# Patient Record
Sex: FEMALE | Race: WHITE | HISPANIC OR LATINO | Employment: PART TIME | URBAN - METROPOLITAN AREA
[De-identification: names, ages, dates, MRNs, and addresses within clinical notes are randomized per-mention and may not be internally consistent; named-entity substitution may affect disease eponyms.]

---

## 2017-01-17 ENCOUNTER — OFFICE VISIT (OUTPATIENT)
Dept: CARDIOLOGY | Facility: MEDICAL CENTER | Age: 62
End: 2017-01-17
Payer: COMMERCIAL

## 2017-01-17 VITALS
BODY MASS INDEX: 26.75 KG/M2 | HEIGHT: 63 IN | WEIGHT: 151 LBS | DIASTOLIC BLOOD PRESSURE: 80 MMHG | OXYGEN SATURATION: 96 % | SYSTOLIC BLOOD PRESSURE: 120 MMHG | HEART RATE: 74 BPM

## 2017-01-17 DIAGNOSIS — R07.2 PRECORDIAL PAIN: ICD-10-CM

## 2017-01-17 PROCEDURE — 99214 OFFICE O/P EST MOD 30 MIN: CPT | Performed by: INTERNAL MEDICINE

## 2017-01-17 ASSESSMENT — ENCOUNTER SYMPTOMS
WEAKNESS: 0
CLAUDICATION: 0
CHILLS: 0
PND: 0
LOSS OF CONSCIOUSNESS: 0
HEADACHES: 0
FALLS: 0
SHORTNESS OF BREATH: 0
FEVER: 0
DIZZINESS: 0
SORE THROAT: 0
COUGH: 0
BLURRED VISION: 0
ABDOMINAL PAIN: 0
FOCAL WEAKNESS: 0
BRUISES/BLEEDS EASILY: 0
PALPITATIONS: 0
NAUSEA: 0

## 2017-01-17 NOTE — MR AVS SNAPSHOT
"Elizabeth Blunt   2017 11:30 AM   Office Visit   MRN: [id number]    Department:  [facility]   Dept Phone:  [phone]    Description:  Female : 1955   Provider:  Jaime Bryan M.D.           Reason for Visit     Follow-Up           Allergies as of 2017     No Known Allergies      You were diagnosed with     Precordial pain   [786.51.ICD-9-CM]         Vital Signs     Blood Pressure Pulse Height Weight Body Mass Index Oxygen Saturation    120/80 mmHg 74 1.6 m (5' 2.99\") 68.493 kg (151 lb) 26.76 kg/m2 96%    Last Menstrual Period Smoking Status                2002 Former Smoker          Basic Information     Date Of Birth Sex Race Ethnicity Preferred Language    1955 Female White  Origin ([place],Faroese,Mosotho,Bhutanese, etc) English      Your appointments     2017  3:20 PM   MA SCRN10 with RB MG 2   [facility] ([address])    [address] [place] [address]  [facility] [id number] [phone]           No deodorant, powder, perfume or lotion under the arm or breast area.            2017  3:00 PM   Established Patient with WILLIAM Tyler   [facility] ([address] [id number] [phone]           You will be receiving a confirmation call a few days before your appointment from our automated call confirmation system.              Problem List              ICD-10-CM Priority Class Noted - Resolved    Dyslipidemia E78.5   Unknown - Present    Low back pain M54.5   Unknown - Present    Allergic rhinitis    Unknown - Present    GERD (gastroesophageal reflux disease) K21.9   2009 - Present    HTN (hypertension) I10   11/10/2009 - Present    Migraine headache G43.909   3/7/2012 - Present    Tear of medial cartilage or meniscus of knee, current [id number]   2014 - Present    Chest pain R07.9   3/4/2015 - Present    Essential hypertension I10   2015 " - Present    Diabetes mellitus without complication (CMS-HCC) E11.9   11/24/2015 - Present    Biliary dyskinesia K82.8   8/3/2016 - Present      Health Maintenance        Date Due Completion Dates    IMM HEP B VACCINE (1 of 3 - Primary Series) 1955 ---    DIABETES MONOFILAMENT / LE EXAM 12/7/2012 12/7/2011 (Done)    Override on 12/7/2011: Done    IMM INFLUENZA (1) 9/1/2016 10/24/2015, 10/1/2014, 12/13/2012, 11/16/2011, 11/10/2010    MAMMOGRAM 11/30/2016 11/30/2015, 11/21/2014, 11/20/2013, 11/19/2012, 11/16/2011, 11/15/2010, 11/13/2009, 11/13/2009, 8/11/2008, 8/11/2008, 8/9/2007, 8/9/2007, 7/31/2006    RETINAL SCREENING 2/9/2017 2/9/2016, 9/24/2013    A1C SCREENING 5/26/2017 11/26/2016, 5/21/2016, 11/19/2015, 8/24/2015, 3/19/2015, 12/18/2014, 9/18/2014, 6/17/2014, 12/16/2013, 9/16/2013, 6/14/2013, 3/11/2013, 12/7/2012, 6/1/2012, 3/5/2012, 11/29/2011, 8/31/2011, 2/9/2011, 11/3/2010, 8/6/2010, 4/20/2010, 2/3/2010, 11/4/2009, 7/20/2009, 1/14/2009    FASTING LIPID PROFILE 11/26/2017 11/26/2016, 5/21/2016, 11/19/2015, 8/24/2015, 3/19/2015, 3/5/2015, 12/18/2014, 9/18/2014, 6/17/2014, 12/16/2013, 9/16/2013, 6/14/2013, 3/11/2013, 12/7/2012, 6/1/2012, 3/5/2012, 11/29/2011, 8/31/2011, 2/9/2011, 11/3/2010, 8/6/2010, 4/20/2010, 2/3/2010, 11/4/2009, 7/20/2009, 1/14/2009    URINE ACR / MICROALBUMIN 11/26/2017 11/26/2016, 8/24/2015, 6/17/2014, 6/14/2013, 6/1/2012, 6/9/2011, 8/6/2010, 11/4/2009, 1/14/2009    SERUM CREATININE 11/26/2017 11/26/2016, 7/27/2016, 7/12/2016, 5/21/2016, 11/19/2015, 8/24/2015, 3/19/2015, 3/4/2015, 12/18/2014, 9/18/2014, 6/17/2014, 1/30/2014, 12/16/2013, 9/16/2013, 6/14/2013, 3/11/2013, 12/7/2012, 9/8/2012, 6/1/2012, 3/5/2012, 11/29/2011, 8/31/2011, 2/9/2011, 11/3/2010, 8/6/2010, 4/20/2010, 3/23/2010, 2/10/2010, 2/3/2010, 2/3/2010, 11/4/2009, 7/20/2009, 1/14/2009    COLONOSCOPY 12/7/2021 12/7/2011 (Prv Comp)    Override on 12/7/2011: Previously completed (3/09)    IMM DTaP/Tdap/Td Vaccine (3 - Td)  12/19/2026 12/19/2016, 2/11/2008            Current Immunizations     Influenza TIV (IM) 12/13/2012, 11/16/2011    Influenza Vaccine Pediatric 11/10/2010    Influenza Vaccine Quad Inj (Pf) 10/24/2015, 10/1/2014    Pneumococcal polysaccharide vaccine (PPSV-23) 12/13/2012    Tdap Vaccine 12/19/2016 12:49 AM, 2/11/2008      Below and/or attached are the medications your provider expects you to take. Review all of your home medications and newly ordered medications with your provider and/or pharmacist. Follow medication instructions as directed by your provider and/or pharmacist. Please keep your medication list with you and share with your provider. Update the information when medications are discontinued, doses are changed, or new medications (including over-the-counter products) are added; and carry medication information at all times in the event of emergency situations     Allergies:  No Known Allergies          Medications  Valid as of: January 17, 2017 - 12:21 PM    Generic Name Brand Name Tablet Size Instructions for use    AmLODIPine Besylate (Tab) NORVASC 5 MG TAKE 1 TABLET BY MOUTH EVERY DAY        Cephalexin (Cap) KEFLEX 500 MG Take 1 Cap by mouth 4 times a day.        Cholecalciferol   Take  by mouth.        Hydrocodone-Acetaminophen (Tab) NORCO 5-325 MG Take 1-2 Tabs by mouth every four hours as needed.        Losartan Potassium-HCTZ (Tab) HYZAAR 100-25 MG TAKE ONE (1) TABLET BY MOUTH ONCE DAILY.        MetFORMIN HCl (Tab) GLUCOPHAGE 1000 MG TAKE 1 TAB BY MOUTH 2 TIMES A DAY, WITH MEALS.        Omeprazole (CAPSULE DELAYED RELEASE) PRILOSEC 20 MG Take 1 Cap by mouth every day.        Simvastatin (Tab) ZOCOR 40 MG TAKE 1 TABLET BY MOUTH IN THE EVENING        SUMAtriptan Succinate (Tab) IMITREX 100 MG Take 1 Tab by mouth Once PRN for Migraine for up to 1 dose.        .                 Medicines prescribed today were sent to:     [facility] #[id number] - [place], [place] - [address] [place] [address]    [address] [place] [address] [place]  [place] [address]    Phone: [phone] Fax: [phone]    Open 24 Hours?: No      Medication refill instructions:       If your prescription bottle indicates you have medication refills left, it is not necessary to call your provider’s office. Please contact your pharmacy and they will refill your medication.    If your prescription bottle indicates you do not have any refills left, you may request refills at any time through one of the following ways: The online Intellisense system (except Urgent Care), by calling your provider’s office, or by asking your pharmacy to contact your provider’s office with a refill request. Medication refills are processed only during regular business hours and may not be available until the next business day. Your provider may request additional information or to have a follow-up visit with you prior to refilling your medication.   *Please Note: Medication refills are assigned a new Rx number when refilled electronically. Your pharmacy may indicate that no refills were authorized even though a new prescription for the same medication is available at the pharmacy. Please request the medicine by name with the pharmacy before contacting your provider for a refill.        Referral     A referral request has been sent to our patient care coordination department. Please allow 3-5 business days for us to process this request and contact you either by phone or mail. If you do not hear from us by the 5th business day, please call us at [phone].           Intellisense Access Code: [email or web address]  Expires: 2/16/2017 12:21 PM    Intellisense  A secure, online tool to manage your health information     [employer]’s Intellisense® is a secure, online tool that connects you to your personalized health information from the privacy of your home -- day or night - making it very easy for you to manage your healthcare. Once the activation process is completed, you can even access your medical information using the  GameLayers giselle, which is available for free in the Apple Giselle store or Google Play store.     GameLayers provides the following levels of access (as shown below):   My Chart Features   [facility] Doctor [facility]  Specialists [facility]  Urgent  Care Non-[facility]  Primary Care  Doctor   Email your healthcare team securely and privately 24/7 X X X    Manage appointments: schedule your next appointment; view details of past/upcoming appointments X      Request prescription refills. X      View recent personal medical records, including lab and immunizations X X X X   View health record, including health history, allergies, medications X X X X   Read reports about your outpatient visits, procedures, consult and ER notes X X X X   See your discharge summary, which is a recap of your hospital and/or ER visit that includes your diagnosis, lab results, and care plan. X X       How to register for GameLayers:  1. Go to  [email or web address].  2. Click on the Sign Up Now box, which takes you to the New Member Sign Up page. You will need to provide the following information:  a. Enter your GameLayers Access Code exactly as it appears at the top of this page. (You will not need to use this code after you’ve completed the sign-up process. If you do not sign up before the expiration date, you must request a new code.)   b. Enter your date of birth.   c. Enter your home email address.   d. Click Submit, and follow the next screen’s instructions.  3. Create a GameLayers ID. This will be your GameLayers login ID and cannot be changed, so think of one that is secure and easy to remember.  4. Create a GameLayers password. You can change your password at any time.  5. Enter your Password Reset Question and Answer. This can be used at a later time if you forget your password.   6. Enter your e-mail address. This allows you to receive e-mail notifications when new information is available in GameLayers.  7. Click Sign Up. You can now view your health  information.    For assistance activating your [employer] account, call [phone]

## 2017-01-17 NOTE — Clinical Note
January 17, 2017       Patient: Elizabeth Blunt   YOB: 1955   Date of Visit: 1/17/2017         To Whom It May Concern:    It is my medical opinion that Elizabeth Blunt had an appointment today.    If you have any questions or concerns, please don't hesitate to call [phone]          Sincerely,          Jaime Bryan M.D.  Electronically Signed

## 2017-01-18 NOTE — PROGRESS NOTES
Subjective:   Elizabeth Bulnt is a 61 y.o. female who presents today for follow-up of recurrent chest pains she had a normal stress echocardiogram with good exertional tolerance last year in that setting    She continues to describe occasional chest pain starting in the anterior chest radiating to the back have been associated with significant stress in the past but she has improved her work situation by a change in her shift and continues to have the pain she did also try Prilosec without significant improvement.  Her chest pain is relieved by drinking a glass or 2 of cold water    Past Medical History   Diagnosis Date   • Dyslipidemia    • Low back pain    • Allergic rhinitis    • HTN    • Migraine headache    • High cholesterol    • Gynecological disorder    • Dental disorder      partial upper   • Diabetes      on meds     Past Surgical History   Procedure Laterality Date   • Sigmoidoscopy flex  3/10     normal   • Ankle orif  5/07   • Colonoscopy  3/09     normal   • Appendectomy  2003   • Abdominal hysterectomy total  2002     partial due to fibroids   • Septoturbinoplasty  3/30/2010     Performed by TRAV MENJIVAR at SURGERY SAME DAY [facility] ORS   • Knee arthroscopy  2/13/2014     Performed by Wiley Kenyon M.D. at SURGERY [facility]   • Medial meniscectomy  2/13/2014     Performed by Wiley Kenyon M.D. at SURGERY [facility]   • Oren by laparoscopy Bilateral 8/3/2016     Procedure: OREN BY LAPAROSCOPY;  Surgeon: Scott Canales M.D.;  Location: SURGERY SAME DAY [facility];  Service:      Family History   Problem Relation Age of Onset   • Hypertension Mother    • Cancer Mother      colon   • Hypertension Father    • Cancer Father      prostate   • Cancer Sister 37     breast cancer     History   Smoking status   • Former Smoker -- 0.50 packs/day for 20 years   • Types: Cigarettes   • Quit date: 01/01/2006   Smokeless tobacco   • Never Used     Comment: 5 cig's a day for 20 yrs quit in 2005     No  "Known Allergies  Outpatient Encounter Prescriptions as of 1/17/2017   Medication Sig Dispense Refill   • hydrocodone-acetaminophen (NORCO) 5-325 MG Tab per tablet Take 1-2 Tabs by mouth every four hours as needed. 20 Tab 0   • cephALEXin (KEFLEX) 500 MG Cap Take 1 Cap by mouth 4 times a day. 28 Cap 0   • omeprazole (PRILOSEC) 20 MG delayed-release capsule Take 1 Cap by mouth every day. 30 Cap 11   • losartan-hydrochlorothiazide (HYZAAR) 100-25 MG per tablet TAKE ONE (1) TABLET BY MOUTH ONCE DAILY. 90 Tab 0   • amlodipine (NORVASC) 5 MG Tab TAKE 1 TABLET BY MOUTH EVERY DAY 90 Tab 0   • metformin (GLUCOPHAGE) 1000 MG tablet TAKE 1 TAB BY MOUTH 2 TIMES A DAY, WITH MEALS. 180 Tab 0   • simvastatin (ZOCOR) 40 MG Tab TAKE 1 TABLET BY MOUTH IN THE EVENING 90 Tab 1   • Cholecalciferol (VITAMIN D PO) Take  by mouth.     • sumatriptan (IMITREX) 100 MG tablet Take 1 Tab by mouth Once PRN for Migraine for up to 1 dose. 10 Tab 3     No facility-administered encounter medications on file as of 1/17/2017.     Review of Systems   Constitutional: Negative for fever and chills.   HENT: Negative for sore throat.    Eyes: Negative for blurred vision.   Respiratory: Negative for cough and shortness of breath.    Cardiovascular: Positive for chest pain. Negative for palpitations, claudication, leg swelling and PND.   Gastrointestinal: Negative for nausea and abdominal pain.   Musculoskeletal: Negative for falls.   Skin: Negative for rash.   Neurological: Negative for dizziness, focal weakness, loss of consciousness, weakness and headaches.   Endo/Heme/Allergies: Does not bruise/bleed easily.        Objective:   /80 mmHg  Pulse 74  Ht 1.6 m (5' 2.99\")  Wt 68.493 kg (151 lb)  BMI 26.76 kg/m2  SpO2 96%  LMP 12/05/2002    Physical Exam   Constitutional: No distress.   HENT:   Mouth/Throat: Oropharynx is clear and moist.   Eyes: No scleral icterus.   Cardiovascular: Normal rate, regular rhythm and intact distal pulses.  Exam " reveals no gallop and no friction rub.    No murmur heard.  Pulmonary/Chest: Effort normal and breath sounds normal. She has no rales.   Abdominal: Bowel sounds are normal. There is no tenderness.   Musculoskeletal: She exhibits no edema.   Neurological: She is alert.   Skin: No rash noted. She is not diaphoretic.   Psychiatric: She has a normal mood and affect.       Assessment:     1. Precordial pain  REFERRAL TO GASTROENTEROLOGY       Medical Decision Making:  Today's Assessment / Status / Plan:     It was my pleasure to meet with Ms. Blunt.    She is accompanied by her     Review of her stress echo with a good quality test she has no evidence of coronary artery disease and therefore I believe her chest discomfort is noncardiac she had a cholecystectomy this year and has also tried PPI without significant improvement.  I have asked her to see gastroenterology for further evaluation for esophageal disease.    Blood pressure is well controlled    She is on appropriate dose statin    She does not require long-term aspirin therapy    Ms. Blunt does not require regular cardiology follow up, I have advised her to call our office or e-mail using my [employer] if needed.    It is my pleasure to participate in the care of Ms. Blunt.  Please do not hesitate to contact me with questions or concerns.    Jaime Bryan MD PhD [facility]  Cardiologist [facility]

## 2017-01-20 ENCOUNTER — TELEPHONE (OUTPATIENT)
Dept: CARDIOLOGY | Facility: MEDICAL CENTER | Age: 62
End: 2017-01-20

## 2017-01-20 ENCOUNTER — HOSPITAL ENCOUNTER (OUTPATIENT)
Dept: RADIOLOGY | Facility: MEDICAL CENTER | Age: 62
End: 2017-01-20
Attending: NURSE PRACTITIONER
Payer: COMMERCIAL

## 2017-01-20 DIAGNOSIS — Z12.31 ENCOUNTER FOR SCREENING MAMMOGRAM FOR BREAST CANCER: ICD-10-CM

## 2017-01-20 PROCEDURE — 77063 BREAST TOMOSYNTHESIS BI: CPT

## 2017-01-20 NOTE — TELEPHONE ENCOUNTER
The referral to gastroenterology has been sent to the office of [facility].       If the patient does not hear from them in a timely manner, they should call [phone]         Referral        Patient's spouse, taking her calls given the information.

## 2017-01-23 ENCOUNTER — TELEPHONE (OUTPATIENT)
Dept: MEDICAL GROUP | Facility: PHYSICIAN GROUP | Age: 62
End: 2017-01-23

## 2017-01-23 NOTE — TELEPHONE ENCOUNTER
----- Message from ISMAEL Dunbar sent at 1/23/2017  1:28 PM PST -----  Recent mammogram is negative for obvious abnormalities, however, the patient has dense breast, which can obscure small masses.  There is additional breast imaging, Sonocine, that can be performed to further evaluate those with dense breasts.  Unfortunately, insurance does not cover this exam yet.  It is $125 out of pocket.  Please let me know if the patient would like to proceed and I will place an order.  Thank you

## 2017-02-18 ENCOUNTER — HOSPITAL ENCOUNTER (OUTPATIENT)
Dept: LAB | Facility: MEDICAL CENTER | Age: 62
End: 2017-02-18
Attending: INTERNAL MEDICINE
Payer: COMMERCIAL

## 2017-02-18 LAB
AMYLASE SERPL-CCNC: 58 U/L (ref 20–103)
CRP SERPL HS-MCNC: 0.21 MG/DL (ref 0–0.75)
ERYTHROCYTE [SEDIMENTATION RATE] IN BLOOD BY WESTERGREN METHOD: 12 MM/HOUR (ref 0–30)
LIPASE SERPL-CCNC: 29 U/L (ref 11–82)

## 2017-02-18 PROCEDURE — 82784 ASSAY IGA/IGD/IGG/IGM EACH: CPT | Mod: 91

## 2017-02-18 PROCEDURE — 82150 ASSAY OF AMYLASE: CPT

## 2017-02-18 PROCEDURE — 86140 C-REACTIVE PROTEIN: CPT

## 2017-02-18 PROCEDURE — 36415 COLL VENOUS BLD VENIPUNCTURE: CPT

## 2017-02-18 PROCEDURE — 83516 IMMUNOASSAY NONANTIBODY: CPT

## 2017-02-18 PROCEDURE — 83690 ASSAY OF LIPASE: CPT

## 2017-02-18 PROCEDURE — 85652 RBC SED RATE AUTOMATED: CPT

## 2017-02-20 LAB
IGA SERPL-MCNC: 281 MG/DL (ref 68–408)
TTG IGA SER IA-ACNC: 1 U/ML (ref 0–3)

## 2017-02-23 ENCOUNTER — HOSPITAL ENCOUNTER (OUTPATIENT)
Dept: RADIOLOGY | Facility: MEDICAL CENTER | Age: 62
End: 2017-02-23
Attending: INTERNAL MEDICINE
Payer: COMMERCIAL

## 2017-02-23 DIAGNOSIS — R07.89 XIPHOID SYNDROME: ICD-10-CM

## 2017-02-23 PROCEDURE — A9270 NON-COVERED ITEM OR SERVICE: HCPCS | Performed by: INTERNAL MEDICINE

## 2017-02-23 PROCEDURE — 700101 HCHG RX REV CODE 250: Performed by: INTERNAL MEDICINE

## 2017-02-23 PROCEDURE — 74220 X-RAY XM ESOPHAGUS 1CNTRST: CPT

## 2017-02-23 RX ADMIN — BARIUM SULFATE 700 MG: 700 TABLET ORAL at 15:00

## 2017-02-23 RX ADMIN — ANTACID/ANTIFLATULENT 1 PACKET: 380; 550; 10; 10 GRANULE, EFFERVESCENT ORAL at 15:00

## 2017-03-13 RX ORDER — LOSARTAN POTASSIUM AND HYDROCHLOROTHIAZIDE 25; 100 MG/1; MG/1
TABLET ORAL
Qty: 90 TAB | Refills: 0 | Status: SHIPPED | OUTPATIENT
Start: 2017-03-13 | End: 2017-06-12 | Stop reason: SDUPTHER

## 2017-03-13 RX ORDER — AMLODIPINE BESYLATE 5 MG/1
TABLET ORAL
Qty: 90 TAB | Refills: 0 | Status: SHIPPED | OUTPATIENT
Start: 2017-03-13 | End: 2017-06-12 | Stop reason: SDUPTHER

## 2017-03-13 NOTE — TELEPHONE ENCOUNTER
Was the patient seen in the last year in this department? Yes     Does patient have an active prescription for medications requested? No     Received Request Via: Pharmacy      Pt met protocol?: Yes pt last ov 11/2016 last a1c+lipid 11/2016   Lab Results   Component Value Date/Time    GLYCOHEMOGLOBIN 6.8* 11/26/2016 09:35 AM      Lab Results   Component Value Date/Time    HDL 58 11/26/2016 09:35 AM      BP Readings from Last 1 Encounters:   01/17/17 120/80

## 2017-04-14 PROBLEM — K22.70 BARRETT'S ESOPHAGUS: Status: ACTIVE | Noted: 2017-04-14

## 2017-06-13 ENCOUNTER — APPOINTMENT (OUTPATIENT)
Dept: MEDICAL GROUP | Facility: PHYSICIAN GROUP | Age: 62
End: 2017-06-13
Payer: COMMERCIAL

## 2017-06-13 RX ORDER — LOSARTAN POTASSIUM AND HYDROCHLOROTHIAZIDE 25; 100 MG/1; MG/1
TABLET ORAL
Qty: 90 TAB | Refills: 0 | Status: SHIPPED | OUTPATIENT
Start: 2017-06-13 | End: 2017-11-23 | Stop reason: SDUPTHER

## 2017-06-13 RX ORDER — AMLODIPINE BESYLATE 5 MG/1
TABLET ORAL
Qty: 90 TAB | Refills: 0 | Status: SHIPPED | OUTPATIENT
Start: 2017-06-13 | End: 2017-11-23 | Stop reason: SDUPTHER

## 2017-06-13 NOTE — TELEPHONE ENCOUNTER
Was the patient seen in the last year in this department? Yes     Does patient have an active prescription for medications requested? No     Received Request Via: Pharmacy      Pt met protocol?: Yes, last ov 11/29/16, scheduled for appointment 6/13/17    Lab Results   Component Value Date/Time    GLYCOHEMOGLOBIN 6.8* 11/26/2016 09:35 AM        Lab Results   Component Value Date/Time    HDL 58 11/26/2016 09:35 AM

## 2017-06-16 ENCOUNTER — OFFICE VISIT (OUTPATIENT)
Dept: MEDICAL GROUP | Facility: PHYSICIAN GROUP | Age: 62
End: 2017-06-16
Payer: COMMERCIAL

## 2017-06-16 VITALS
OXYGEN SATURATION: 96 % | RESPIRATION RATE: 14 BRPM | HEART RATE: 100 BPM | HEIGHT: 63 IN | BODY MASS INDEX: 27.68 KG/M2 | WEIGHT: 156.2 LBS | SYSTOLIC BLOOD PRESSURE: 102 MMHG | TEMPERATURE: 97.3 F | DIASTOLIC BLOOD PRESSURE: 80 MMHG

## 2017-06-16 DIAGNOSIS — G43.909 MIGRAINE WITHOUT STATUS MIGRAINOSUS, NOT INTRACTABLE, UNSPECIFIED MIGRAINE TYPE: ICD-10-CM

## 2017-06-16 DIAGNOSIS — K21.9 GASTROESOPHAGEAL REFLUX DISEASE WITHOUT ESOPHAGITIS: ICD-10-CM

## 2017-06-16 DIAGNOSIS — K22.70 BARRETT'S ESOPHAGUS WITHOUT DYSPLASIA: Chronic | ICD-10-CM

## 2017-06-16 DIAGNOSIS — E11.9 DIABETES MELLITUS WITHOUT COMPLICATION (HCC): Chronic | ICD-10-CM

## 2017-06-16 DIAGNOSIS — R07.9 CHEST PAIN, UNSPECIFIED TYPE: Chronic | ICD-10-CM

## 2017-06-16 DIAGNOSIS — I10 ESSENTIAL HYPERTENSION: Chronic | ICD-10-CM

## 2017-06-16 PROCEDURE — 99214 OFFICE O/P EST MOD 30 MIN: CPT | Performed by: NURSE PRACTITIONER

## 2017-06-16 ASSESSMENT — PATIENT HEALTH QUESTIONNAIRE - PHQ9: CLINICAL INTERPRETATION OF PHQ2 SCORE: 0

## 2017-06-16 NOTE — ASSESSMENT & PLAN NOTE
Patient reports midsternal chest pain occurring this morning. She's had a cardiac workup done and was referred to gastroenterology. She has a history of Dalton's esophagus and is currently taking omeprazole. Symptoms are improved with drinking water.

## 2017-06-16 NOTE — MR AVS SNAPSHOT
"        Elizabeth Blunt   2017 3:20 PM   Office Visit   MRN: [id number]    Department:  [facility]   Dept Phone:  [phone]    Description:  Female : 1955   Provider:  WILLIAM Tyler           Reason for Visit     Medication Management           Allergies as of 2017     No Known Allergies      You were diagnosed with     Chest pain, unspecified type   [7328658]       Diabetes mellitus without complication (CMS-HCC)   [435899]       Essential hypertension   [5212703]       Gastroesophageal reflux disease without esophagitis   [534018]       Migraine without status migrainosus, not intractable, unspecified migraine type   [4539473]         Vital Signs     Blood Pressure Pulse Temperature Respirations Height Weight    102/80 mmHg 100 36.3 °C (97.3 °F) 14 1.6 m (5' 2.99\") 70.852 kg (156 lb 3.2 oz)    Body Mass Index Oxygen Saturation Last Menstrual Period Smoking Status          27.68 kg/m2 96% 2002 Former Smoker        Basic Information     Date Of Birth Sex Race Ethnicity Preferred Language    1955 Female White  Origin ([place]er,[place]vian,[place], etc) English      Problem List              ICD-10-CM Priority Class Noted - Resolved    Dyslipidemia E78.5   Unknown - Present    Allergic rhinitis    Unknown - Present    GERD (gastroesophageal reflux disease) K21.9   2009 - Present    Migraine headache G43.909   3/7/2012 - Present    Tear of medial cartilage or meniscus of knee, current [id number]   2014 - Present    Chest pain R07.9   3/4/2015 - Present    Essential hypertension I10   2015 - Present    Diabetes mellitus without complication (CMS-HCC) E11.9   2015 - Present    Biliary dyskinesia K82.8   8/3/2016 - Present    Dalton's esophagus K22.70   2017 - Present      Health Maintenance        Date Due Completion Dates    IMM HEP B VACCINE (1 of 3 - Primary Series) 1955 ---    DIABETES MONOFILAMENT / LE EXAM 2012 " 12/7/2011 (Done)    Override on 12/7/2011: Done    A1C SCREENING 5/26/2017 11/26/2016, 5/21/2016, 11/19/2015, 8/24/2015, 3/19/2015, 12/18/2014, 9/18/2014, 6/17/2014, 12/16/2013, 9/16/2013, 6/14/2013, 3/11/2013, 12/7/2012, 6/1/2012, 3/5/2012, 11/29/2011, 8/31/2011, 2/9/2011, 11/3/2010, 8/6/2010, 4/20/2010, 2/3/2010, 11/4/2009, 7/20/2009, 1/14/2009    IMM ZOSTER VACCINE 5/26/2017 ---    FASTING LIPID PROFILE 11/26/2017 11/26/2016, 5/21/2016, 11/19/2015, 8/24/2015, 3/19/2015, 3/5/2015, 12/18/2014, 9/18/2014, 6/17/2014, 12/16/2013, 9/16/2013, 6/14/2013, 3/11/2013, 12/7/2012, 6/1/2012, 3/5/2012, 11/29/2011, 8/31/2011, 2/9/2011, 11/3/2010, 8/6/2010, 4/20/2010, 2/3/2010, 11/4/2009, 7/20/2009, 1/14/2009    URINE ACR / MICROALBUMIN 11/26/2017 11/26/2016, 8/24/2015, 6/17/2014, 6/14/2013, 6/1/2012, 6/9/2011, 8/6/2010, 11/4/2009, 1/14/2009    SERUM CREATININE 11/26/2017 11/26/2016, 7/27/2016, 7/12/2016, 5/21/2016, 11/19/2015, 8/24/2015, 3/19/2015, 3/4/2015, 12/18/2014, 9/18/2014, 6/17/2014, 1/30/2014, 12/16/2013, 9/16/2013, 6/14/2013, 3/11/2013, 12/7/2012, 9/8/2012, 6/1/2012, 3/5/2012, 11/29/2011, 8/31/2011, 2/9/2011, 11/3/2010, 8/6/2010, 4/20/2010, 3/23/2010, 2/10/2010, 2/3/2010, 2/3/2010, 11/4/2009, 7/20/2009, 1/14/2009    MAMMOGRAM 1/20/2018 1/20/2017, 11/30/2015, 11/21/2014, 11/20/2013, 11/19/2012, 11/16/2011, 11/15/2010, 11/13/2009, 11/13/2009, 8/11/2008, 8/11/2008, 8/9/2007, 8/9/2007, 7/31/2006    RETINAL SCREENING 4/26/2018 4/26/2017, 2/9/2016, 9/24/2013    COLONOSCOPY 12/7/2021 12/7/2011 (Prv Comp)    Override on 12/7/2011: Previously completed (3/09)    IMM DTaP/Tdap/Td Vaccine (3 - Td) 12/19/2026 12/19/2016, 2/11/2008            Current Immunizations     Influenza TIV (IM) 12/13/2012, 11/16/2011    Influenza Vaccine Pediatric 11/10/2010    Influenza Vaccine Quad Inj (Pf) 10/24/2015, 10/1/2014    Pneumococcal polysaccharide vaccine (PPSV-23) 12/13/2012    Tdap Vaccine 12/19/2016 12:49 AM, 2/11/2008      Below and/or  attached are the medications your provider expects you to take. Review all of your home medications and newly ordered medications with your provider and/or pharmacist. Follow medication instructions as directed by your provider and/or pharmacist. Please keep your medication list with you and share with your provider. Update the information when medications are discontinued, doses are changed, or new medications (including over-the-counter products) are added; and carry medication information at all times in the event of emergency situations     Allergies:  No Known Allergies          Medications  Valid as of: June 16, 2017 -  3:44 PM    Generic Name Brand Name Tablet Size Instructions for use    AmLODIPine Besylate (Tab) NORVASC 5 MG TAKE 1 TABLET BY MOUTH EVERY DAY        Cephalexin (Cap) KEFLEX 500 MG Take 1 Cap by mouth 4 times a day.        Cholecalciferol   Take  by mouth.        Hydrocodone-Acetaminophen (Tab) NORCO 5-325 MG Take 1-2 Tabs by mouth every four hours as needed.        Losartan Potassium-HCTZ (Tab) HYZAAR 100-25 MG TAKE 1 TAB BY MOUTH ONCE DAILY        MetFORMIN HCl (Tab) GLUCOPHAGE 1000 MG TAKE 1 TAB BY MOUTH 2 TIMES A DAY, WITH MEALS.        Omeprazole (CAPSULE DELAYED RELEASE) PRILOSEC 20 MG Take 1 Cap by mouth every day.        Simvastatin (Tab) ZOCOR 40 MG TAKE 1 TABLET BY MOUTH IN THE EVENING        SUMAtriptan Succinate (Tab) IMITREX 100 MG Take 1 Tab by mouth Once PRN for Migraine for up to 1 dose.        .                 Medicines prescribed today were sent to:     [id number] - [place], [place] - [address]    [address] [facility]. [place] [address]    Phone: [phone] Fax: [phone]    Open 24 Hours?: No      Medication refill instructions:       If your prescription bottle indicates you have medication refills left, it is not necessary to call your provider’s office. Please contact your pharmacy and they will refill your medication.    If your prescription bottle indicates you  do not have any refills left, you may request refills at any time through one of the following ways: The online [employer] system (except Urgent Care), by calling your provider’s office, or by asking your pharmacy to contact your provider’s office with a refill request. Medication refills are processed only during regular business hours and may not be available until the next business day. Your provider may request additional information or to have a follow-up visit with you prior to refilling your medication.   *Please Note: Medication refills are assigned a new Rx number when refilled electronically. Your pharmacy may indicate that no refills were authorized even though a new prescription for the same medication is available at the pharmacy. Please request the medicine by name with the pharmacy before contacting your provider for a refill.        Your To Do List     Future Labs/Procedures Complete By Expires    COMP METABOLIC PANEL  As directed 6/16/2018    HEMOGLOBIN A1C  As directed 6/16/2018    LIPID PROFILE  As directed 6/16/2018    MICROALBUMIN CREAT RATIO URINE (LAB COLLECT)  As directed 6/16/2018      Instructions    Esófago de Tomasz   (Dalton's Esophagus)   El esófago de Tomasz se produce cuando la superficie interna del esófago está dañada. El esófago es un conducto que transporta los alimentos y bebidas desde la boca al estómago. En el esófago de Tomasz el recubrimiento interno del esófago es reemplazado por un tejido similar a la superficie interna de los intestinos. Diaan proceso se denomina metaplasia intestinal. Un pequeño número de personas con esófago de Tomasz desarrolla cáncer esofágico.   CAUSAS   La causa exacta del esófago de Tomasz es desconocida.   SÍNTOMAS   La mayoría de las personas no tiene síntomas. Sin embargo, algunos pacientes sufren enfermedad de reflujo gastroesofágico (ERGE) El ERGE puede causar acidez, dificultad para tragar y tos seca.   DIAGNÓSTICO   El diagnóstico se realiza por  medio de un examen llamado endoscopía gastrointestinal superior. Un tubo javier y flexible (endoscopio) se pasa por el esófago hasta llegar al estómago. El endoscopio tiene courtney shaquille y courtney cámara en el extremo. El médico utilizará el endoscopio para observar el interior del estómago. Podrán tomarle courtney muestra de tejido (biopsia) para ser examinada en el microscopio. Se llama displasia cuando se encuentran células cancerosas yadira la biopsia.   TRATAMIENTO   Si no tiene displasia o es de bajo amelie, puede ser que el médico no indique tratamiento o le recete medicamentos para tratar la ERGE. A veces, el uso de antiácidos para tratar el ERGE ayuda a mejorar el tejido afectado por el esófago de Dalton. El médico también puede recomendar exámenes periódicos del esófago.   Si tiene displasia en amelie alto, el tratamiento puede incluir la eliminación de las partes dañadas del esófago. [place] puede hacerse por calentamiento, congelación o por extirpación quirúrgica del tejido. En algunos casos la cirugía es necesaria para extirpar la mayor parte del esófago. Luego el estómago vuelve a unirse a la porción que queda del esófago.   INSTRUCCIONES PARA EL CUIDADO EN EL HOGAR   · [place] los antiácidos para el ERGE, si se los indica el médico.  · Cumpla con todas las visitas de control, según le indique brito médico. Podrá necesitar exámenes periódicos del esófago.  SOLICITE ATENCIÓN MÉDICA DE INMEDIATO SI:   · Siente dolor en el pecho.  · Presenta dificultad para tragar.  · Vomita peter de color beck brillante o material similar a granos de café.  · La materia fecal es beck brillante u oscura.  Document Released: 06/18/2013  [employer]Care® Patient Information ©2014 [employer].            MyChart Status: Patient Declined

## 2017-06-16 NOTE — PATIENT INSTRUCTIONS
Esófago de Tomasz   (Dalton's Esophagus)   El esófago de Tomasz se produce cuando la superficie interna del esófago está dañada. El esófago es un conducto que transporta los alimentos y bebidas desde la boca al estómago. En el esófago de Tomasz el recubrimiento interno del esófago es reemplazado por un tejido similar a la superficie interna de los intestinos. Diana proceso se denomina metaplasia intestinal. Un pequeño número de personas con esófago de Tomasz desarrolla cáncer esofágico.   CAUSAS   La causa exacta del esófago de Tomasz es desconocida.   SÍNTOMAS   La mayoría de las personas no tiene síntomas. Sin embargo, algunos pacientes sufren enfermedad de reflujo gastroesofágico (ERGE) El ERGE puede causar acidez, dificultad para tragar y tos seca.   DIAGNÓSTICO   El diagnóstico se realiza por medio de un examen llamado endoscopía gastrointestinal superior. Un tubo javier y flexible (endoscopio) se pasa por el esófago hasta llegar al estómago. El endoscopio tiene courtney shaquille y courtney cámara en el extremo. El médico utilizará el endoscopio para observar el interior del estómago. Podrán tomarle courtney muestra de tejido (biopsia) para ser examinada en el microscopio. Se llama displasia cuando se encuentran células cancerosas yadira la biopsia.   TRATAMIENTO   Si no tiene displasia o es de bajo amelie, puede ser que el médico no indique tratamiento o le recete medicamentos para tratar la ERGE. A veces, el uso de antiácidos para tratar el ERGE ayuda a mejorar el tejido afectado por el esófago de Dalton. El médico también puede recomendar exámenes periódicos del esófago.   Si tiene displasia en amelie alto, el tratamiento puede incluir la eliminación de las partes dañadas del esófago. [place] puede hacerse por calentamiento, congelación o por extirpación quirúrgica del tejido. En algunos casos la cirugía es necesaria para extirpar la mayor parte del esófago. Luego el estómago vuelve a unirse a la porción que queda del esófago.    INSTRUCCIONES PARA EL CUIDADO EN EL HOGAR   · [place] los antiácidos para el ERGE, si se los indica el médico.  · Cumpla con todas las visitas de control, según le indique brito médico. Podrá necesitar exámenes periódicos del esófago.  SOLICITE ATENCIÓN MÉDICA DE INMEDIATO SI:   · Siente dolor en el pecho.  · Presenta dificultad para tragar.  · Vomita peter de color beck brillante o material similar a granos de café.  · La materia fecal es beck brillante u oscura.  Document Released: 06/18/2013  [employer]® Patient Information ©2014 [employer].

## 2017-06-19 NOTE — PROGRESS NOTES
Subjective:     Chief Complaint   Patient presents with   • Medication Management       HPI:  Elizabeth Blunt is a 62 y.o. female here today to discuss the following:    Chest pain  Patient reports midsternal chest pain occurring this morning. She's had a cardiac workup done and was referred to gastroenterology. She has a history of Dalton's esophagus and is currently taking omeprazole. Symptoms are improved with drinking water.    Diabetes mellitus without complication  Chronic Condition: Patient had type 2 diabetes mellitus for several years. She does not check blood sugar at home. She denies any tremors, anxiety, weakness, headache, dizziness, blurry vision, fatigue, irritability or palpitations. Patient denies any drowsiness, dry skin, polyphagia or polydipsia, polyuria or nausea.       Essential hypertension  Chronic condition: Patient is treated for hypertension with amlodipine and Hyzaar.  She denies any  diaphoresis, shortness of breath, headaches, dizziness or blurred vision. She has been experiencing chest pain and was seen by cardiology who has referred her for GI followup.    Migraine headache  Patient has intermittent migraine headaches and uses sumatriptan with adequate relief.    Dalton's esophagus  Patient was previously diagnosed with Dalton's esophagus and does not understand what that is. She is currently reporting epigastric and chest pain, but denies acid reflux.           Current medicines (including changes today)  Current Outpatient Prescriptions   Medication Sig Dispense Refill   • metformin (GLUCOPHAGE) 1000 MG tablet TAKE 1 TAB BY MOUTH 2 TIMES A DAY, WITH MEALS. 180 Tab 0   • losartan-hydrochlorothiazide (HYZAAR) 100-25 MG per tablet TAKE 1 TAB BY MOUTH ONCE DAILY 90 Tab 0   • amlodipine (NORVASC) 5 MG Tab TAKE 1 TABLET BY MOUTH EVERY DAY 90 Tab 0   • simvastatin (ZOCOR) 40 MG Tab TAKE 1 TABLET BY MOUTH IN THE EVENING 90 Tab 0   • hydrocodone-acetaminophen (NORCO) 5-325 MG Tab per  "tablet Take 1-2 Tabs by mouth every four hours as needed. 20 Tab 0   • cephALEXin (KEFLEX) 500 MG Cap Take 1 Cap by mouth 4 times a day. 28 Cap 0   • omeprazole (PRILOSEC) 20 MG delayed-release capsule Take 1 Cap by mouth every day. 30 Cap 11   • Cholecalciferol (VITAMIN D PO) Take  by mouth.     • sumatriptan (IMITREX) 100 MG tablet Take 1 Tab by mouth Once PRN for Migraine for up to 1 dose. 10 Tab 3     No current facility-administered medications for this visit.       She  has a past medical history of Dyslipidemia; Low back pain; Allergic rhinitis; HTN; Migraine headache; High cholesterol; Gynecological disorder; Dental disorder; and Diabetes.    ROS   Review of Systems   Constitutional: Negative for fever, chills, weight loss and malaise/fatigue.   HENT: Negative for ear pain, nosebleeds, congestion, sore throat and neck pain.    Respiratory: Negative for cough, sputum production, shortness of breath and wheezing.    Cardiovascular: Negative for chest pain, palpitations,  and leg swelling.   Gastrointestinal: Negative for heartburn, nausea, vomiting, diarrhea and abdominal pain. Positive for epigastric pain  Neurological: Negative for dizziness, tingling, tremors, sensory change, focal weakness. Positive for headaches.   Psychiatric/Behavioral: Negative for depression, anxiety, suicidal ideas, insomnia and memory loss.    All other systems reviewed and are negative except as in HPI.     Objective:   Physical Exam:  Blood pressure 102/80, pulse 100, temperature 36.3 °C (97.3 °F), resp. rate 14, height 1.6 m (5' 2.99\"), weight 70.852 kg (156 lb 3.2 oz), last menstrual period 12/05/2002, SpO2 96 %. Body mass index is 27.68 kg/(m^2).   Physical Exam:  Alert, oriented in no acute distress.  Eye contact is good, speech goal directed, affect calm  HEENT: conjunctiva non-injected, sclera non-icteric.  Pinna normal.   Neck No adenopathy or masses in the neck or supraclavicular regions.  Lungs: clear to auscultation " "bilaterally with good excursion.  CV: regular rate and rhythm.   Abdomen: soft, nontender, No CVAT. Normal bowel sounds.  Ext: no edema, color normal, vascularity normal, temperature normal  MS: Normal gait and station    Assessment and Plan:   The following treatment plan was discussed   1. Chest pain, unspecified type      follow-up with gastroenterology   2. Diabetes mellitus without complication (CMS-HCC)  COMP METABOLIC PANEL    HEMOGLOBIN A1C    LIPID PROFILE    MICROALBUMIN CREAT RATIO URINE (LAB COLLECT)    continue current medication. Labs ordered   3. Essential hypertension      continue current medication   4. Gastroesophageal reflux disease without esophagitis      follow-up with GI   5. Migraine without status migrainosus, not intractable, unspecified migraine type      continue current medicatio   6. Dalton's esophagus without dysplasia      Discussed and provided handout.   I have discussed Tomasz\"s esophagus and provided the patient with informational handout.  ED precautions: seek emergency evaluation for symptoms including but not limited to : crushing chest pain, chest pain associated with difficulty breathing, nausea, or sweats, heart rate irregular or too fast to count.     Followup: Return in about 3 months (around 9/16/2017), or if symptoms worsen or fail to improve, for DM.   Please note that this dictation was created using voice recognition software. I have made every reasonable attempt to correct obvious errors, but I expect that there are errors of grammar and possibly content that I did not discover before finalizing the note.             "

## 2017-06-20 NOTE — ASSESSMENT & PLAN NOTE
Patient was previously diagnosed with Dalton's esophagus and does not understand what that is. She is currently reporting epigastric and chest pain, but denies acid reflux.

## 2017-06-20 NOTE — ASSESSMENT & PLAN NOTE
Chronic Condition: Patient had type 2 diabetes mellitus for several years. She does not check blood sugar at home. She denies any tremors, anxiety, weakness, headache, dizziness, blurry vision, fatigue, irritability or palpitations. Patient denies any drowsiness, dry skin, polyphagia or polydipsia, polyuria or nausea.

## 2017-06-20 NOTE — ASSESSMENT & PLAN NOTE
Chronic condition: Patient is treated for hypertension with amlodipine and Hyzaar.  She denies any  diaphoresis, shortness of breath, headaches, dizziness or blurred vision. She has been experiencing chest pain and was seen by cardiology who has referred her for GI followup.

## 2017-09-13 ENCOUNTER — HOSPITAL ENCOUNTER (OUTPATIENT)
Dept: LAB | Facility: MEDICAL CENTER | Age: 62
End: 2017-09-13
Attending: NURSE PRACTITIONER
Payer: COMMERCIAL

## 2017-09-13 DIAGNOSIS — E11.9 DIABETES MELLITUS WITHOUT COMPLICATION (HCC): Chronic | ICD-10-CM

## 2017-09-13 LAB
ALBUMIN SERPL BCP-MCNC: 4.1 G/DL (ref 3.2–4.9)
ALBUMIN/GLOB SERPL: 1.3 G/DL
ALP SERPL-CCNC: 105 U/L (ref 30–99)
ALT SERPL-CCNC: 29 U/L (ref 2–50)
ANION GAP SERPL CALC-SCNC: 15 MMOL/L (ref 0–11.9)
AST SERPL-CCNC: 19 U/L (ref 12–45)
BILIRUB SERPL-MCNC: 0.5 MG/DL (ref 0.1–1.5)
BUN SERPL-MCNC: 14 MG/DL (ref 8–22)
CALCIUM SERPL-MCNC: 9.8 MG/DL (ref 8.5–10.5)
CHLORIDE SERPL-SCNC: 101 MMOL/L (ref 96–112)
CHOLEST SERPL-MCNC: 206 MG/DL (ref 100–199)
CO2 SERPL-SCNC: 22 MMOL/L (ref 20–33)
CREAT SERPL-MCNC: 0.6 MG/DL (ref 0.5–1.4)
CREAT UR-MCNC: 257.5 MG/DL
EST. AVERAGE GLUCOSE BLD GHB EST-MCNC: 246 MG/DL
GFR SERPL CREATININE-BSD FRML MDRD: >60 ML/MIN/1.73 M 2
GLOBULIN SER CALC-MCNC: 3.2 G/DL (ref 1.9–3.5)
GLUCOSE SERPL-MCNC: 180 MG/DL (ref 65–99)
HBA1C MFR BLD: 10.2 % (ref 0–5.6)
HDLC SERPL-MCNC: 63 MG/DL
LDLC SERPL CALC-MCNC: 117 MG/DL
MICROALBUMIN UR-MCNC: 1.9 MG/DL
MICROALBUMIN/CREAT UR: 7 MG/G (ref 0–30)
POTASSIUM SERPL-SCNC: 3.8 MMOL/L (ref 3.6–5.5)
PROT SERPL-MCNC: 7.3 G/DL (ref 6–8.2)
SODIUM SERPL-SCNC: 138 MMOL/L (ref 135–145)
TRIGL SERPL-MCNC: 129 MG/DL (ref 0–149)

## 2017-09-13 PROCEDURE — 83036 HEMOGLOBIN GLYCOSYLATED A1C: CPT

## 2017-09-13 PROCEDURE — 80061 LIPID PANEL: CPT

## 2017-09-13 PROCEDURE — 80053 COMPREHEN METABOLIC PANEL: CPT

## 2017-09-13 PROCEDURE — 36415 COLL VENOUS BLD VENIPUNCTURE: CPT

## 2017-09-13 PROCEDURE — 82570 ASSAY OF URINE CREATININE: CPT

## 2017-09-13 PROCEDURE — 82043 UR ALBUMIN QUANTITATIVE: CPT

## 2017-09-19 ENCOUNTER — OFFICE VISIT (OUTPATIENT)
Dept: MEDICAL GROUP | Facility: PHYSICIAN GROUP | Age: 62
End: 2017-09-19
Payer: COMMERCIAL

## 2017-09-19 VITALS
HEART RATE: 71 BPM | TEMPERATURE: 97.7 F | HEIGHT: 63 IN | DIASTOLIC BLOOD PRESSURE: 84 MMHG | WEIGHT: 151.6 LBS | RESPIRATION RATE: 16 BRPM | SYSTOLIC BLOOD PRESSURE: 132 MMHG | OXYGEN SATURATION: 96 % | BODY MASS INDEX: 26.86 KG/M2

## 2017-09-19 DIAGNOSIS — E11.9 DIABETES MELLITUS WITHOUT COMPLICATION (HCC): ICD-10-CM

## 2017-09-19 PROCEDURE — 99213 OFFICE O/P EST LOW 20 MIN: CPT | Performed by: NURSE PRACTITIONER

## 2017-09-19 NOTE — ASSESSMENT & PLAN NOTE
Chronic Condition: Patient had type 2 diabetes mellitus for several years.She does not  check blood sugar at home. She dnies any tremors, anxiety, weakness, headache, dizziness, blurry vision, fatigue, irritability or palpitations. Patient denies any drowsiness, dry skin, polyphagia or polydipsia, polyuria or nausea. Patient had shoulder surgery on August 29, 2017 and blood draw was done on September 13, 2017. We discussed rechecking labs and scheduling with the diabatic CNE.  Patient does not want to add new medication or have to use insulin. We will repeat labs prior to next visit.

## 2017-09-20 NOTE — PROGRESS NOTES
Subjective:     Chief Complaint   Patient presents with   • Results     Labs        HPI:  Elizabeth Blunt is a 62 y.o. female here today to discuss the following:    Diabetes mellitus without complication  Chronic Condition: Patient had type 2 diabetes mellitus for several years.She does not  check blood sugar at home. She dnies any tremors, anxiety, weakness, headache, dizziness, blurry vision, fatigue, irritability or palpitations. Patient denies any drowsiness, dry skin, polyphagia or polydipsia, polyuria or nausea. Patient had shoulder surgery on August 29, 2017 and blood draw was done on September 13, 2017. We discussed rechecking labs and scheduling with the diabatic CNE.  Patient does not want to add new medication or have to use insulin. We will repeat labs prior to next visit.         Current medicines (including changes today)  Current Outpatient Prescriptions   Medication Sig Dispense Refill   • metformin (GLUCOPHAGE) 1000 MG tablet TAKE 1 TAB BY MOUTH 2 TIMES A DAY, WITH MEALS. 180 Tab 0   • losartan-hydrochlorothiazide (HYZAAR) 100-25 MG per tablet TAKE 1 TAB BY MOUTH ONCE DAILY 90 Tab 0   • amlodipine (NORVASC) 5 MG Tab TAKE 1 TABLET BY MOUTH EVERY DAY 90 Tab 0   • simvastatin (ZOCOR) 40 MG Tab TAKE 1 TABLET BY MOUTH IN THE EVENING 90 Tab 0   • hydrocodone-acetaminophen (NORCO) 5-325 MG Tab per tablet Take 1-2 Tabs by mouth every four hours as needed. 20 Tab 0   • omeprazole (PRILOSEC) 20 MG delayed-release capsule Take 1 Cap by mouth every day. 30 Cap 11   • Cholecalciferol (VITAMIN D PO) Take  by mouth.     • sumatriptan (IMITREX) 100 MG tablet Take 1 Tab by mouth Once PRN for Migraine for up to 1 dose. 10 Tab 3     No current facility-administered medications for this visit.        She  has a past medical history of Allergic rhinitis; Dental disorder; Diabetes; Dyslipidemia; Gynecological disorder; High cholesterol; HTN; Low back pain; and Migraine headache.    ROS   Review of Systems  "  Constitutional: Negative for fever, chills, weight loss and malaise/fatigue.   HENT: Negative for ear pain, nosebleeds, congestion, sore throat and neck pain.    Respiratory: Negative for cough, sputum production, shortness of breath and wheezing.    Cardiovascular: Negative for chest pain, palpitations,  and leg swelling.   Gastrointestinal: Negative for heartburn, nausea, vomiting, diarrhea and abdominal pain.  MS: post left shoulder surgery   Neurological: Negative for dizziness, tingling, tremors, sensory change, focal weakness and headaches.   Psychiatric/Behavioral: Negative for depression, anxiety, suicidal ideas, insomnia and memory loss.    All other systems reviewed and are negative except as in HPI.     Objective:   Physical Exam:  Blood pressure 132/84, pulse 71, temperature 36.5 °C (97.7 °F), resp. rate 16, height 1.6 m (5' 2.99\"), weight 68.8 kg (151 lb 9.6 oz), last menstrual period 12/05/2002, SpO2 96 %. Body mass index is 26.86 kg/m².   Physical Exam:  Alert, oriented in no acute distress.  Eye contact is good, speech goal directed, affect calm  HEENT: conjunctiva non-injected, sclera non-icteric.  Pinna normal.   Neck No adenopathy or masses in the neck or supraclavicular regions.  Lungs: clear to auscultation bilaterally with good excursion.  CV: regular rate and rhythm.   Abdomen: soft, nontender, No CVAT. Normal bowel sounds.  Ext: no edema, color normal, vascularity normal, temperature normal  MS: Normal gait and station. Left shoulder immobilized    Assessment and Plan:   The following treatment plan was discussed   1. Diabetes mellitus without complication (CMS-HCC)  COMP METABOLIC PANEL    HEMOGLOBIN A1C       Followup: Return in about 3 months (around 12/19/2017) for Diabetic CNE, DM.   Please note that this dictation was created using voice recognition software. I have made every reasonable attempt to correct obvious errors, but I expect that there are errors of grammar and possibly " content that I did not discover before finalizing the note.

## 2017-10-25 ENCOUNTER — OFFICE VISIT (OUTPATIENT)
Dept: MEDICAL GROUP | Facility: PHYSICIAN GROUP | Age: 62
End: 2017-10-25
Payer: COMMERCIAL

## 2017-10-25 VITALS
HEIGHT: 63 IN | OXYGEN SATURATION: 98 % | HEART RATE: 96 BPM | TEMPERATURE: 98.2 F | SYSTOLIC BLOOD PRESSURE: 130 MMHG | RESPIRATION RATE: 14 BRPM | DIASTOLIC BLOOD PRESSURE: 90 MMHG | BODY MASS INDEX: 26.51 KG/M2 | WEIGHT: 149.6 LBS

## 2017-10-25 DIAGNOSIS — B37.9 CANDIDA INFECTION: ICD-10-CM

## 2017-10-25 PROCEDURE — 99214 OFFICE O/P EST MOD 30 MIN: CPT | Performed by: NURSE PRACTITIONER

## 2017-10-25 RX ORDER — FLUCONAZOLE 100 MG/1
100 TABLET ORAL DAILY
Qty: 5 TAB | Refills: 0 | Status: SHIPPED | OUTPATIENT
Start: 2017-10-25 | End: 2017-10-30

## 2017-10-25 RX ORDER — NYSTATIN 100000 U/G
CREAM TOPICAL
Qty: 8.5 G | Refills: 1 | Status: SHIPPED | OUTPATIENT
Start: 2017-10-25 | End: 2019-06-21

## 2017-10-25 NOTE — LETTER
October 25, 2017         Patient: Elizabeth Blunt   YOB: 1955   Date of Visit: 10/25/2017           To Whom it May Concern:    Elizabeth Blunt was seen in my clinic on 10/25/2017. Please excuse her. She may return to work on 10/26/2017.    If you have any questions or concerns, please don't hesitate to call.        Sincerely,       NOVA Tyler.  Electronically Signed

## 2017-10-25 NOTE — PATIENT INSTRUCTIONS
Fluconazole tablets  What is this medicine?  FLUCONAZOLE (floo RADHA na zole) is an antifungal medicine. It is used to treat certain kinds of fungal or yeast infections.  This medicine may be used for other purposes; ask your health care provider or pharmacist if you have questions.  COMMON BRAND NAME(S): Diflucan  What should I tell my health care provider before I take this medicine?  They need to know if you have any of these conditions:  -electrolyte abnormalities  -history of irregular heart beat  -kidney disease  -an unusual or allergic reaction to fluconazole, other azole antifungals, medicines, foods, dyes, or preservatives  -pregnant or trying to get pregnant  -breast-feeding  How should I use this medicine?  Take this medicine by mouth. Follow the directions on the prescription label. Do not take your medicine more often than directed.  Talk to your pediatrician regarding the use of this medicine in children. Special care may be needed. This medicine has been used in children as young as 6 months of age.  Overdosage: If you think you have taken too much of this medicine contact a poison control center or emergency room at once.  NOTE: This medicine is only for you. Do not share this medicine with others.  What if I miss a dose?  If you miss a dose, take it as soon as you can. If it is almost time for your next dose, take only that dose. Do not take double or extra doses.  What may interact with this medicine?  Do not take this medicine with any of the following medications:  -cisapride  -pimozide  -red yeast rice  This medicine may also interact with the following medications:  -birth control pills  -cyclosporine  -diuretics like hydrochlorothiazide  -medicines for diabetes that are taken by mouth  -medicines for high cholesterol like atorvastatin, lovastatin or simvastatin  -phenytoin  -ramelteon  -rifabutin  -rifampin  -some medicines for anxiety or  sleep  -tacrolimus  -terfenadine  -theophylline  -tofacitinib  -warfarin  This list may not describe all possible interactions. Give your health care provider a list of all the medicines, herbs, non-prescription drugs, or dietary supplements you use. Also tell them if you smoke, drink alcohol, or use illegal drugs. Some items may interact with your medicine.  What should I watch for while using this medicine?  Visit your doctor or health care professional for regular checkups. If you are taking this medicine for a long time you may need blood work. Tell your doctor if your symptoms do not improve. Some fungal infections need many weeks or months of treatment to cure.  Alcohol can increase possible damage to your liver. Avoid alcoholic drinks.  If you have a vaginal infection, do not have sex until you have finished your treatment. You can wear a sanitary napkin. Do not use tampons. Wear freshly washed cotton, not synthetic, panties.  What side effects may I notice from receiving this medicine?  Side effects that you should report to your doctor or health care professional as soon as possible:  -allergic reactions like skin rash or itching, hives, swelling of the lips, mouth, tongue, or throat  -dark urine  -feeling dizzy or faint  -irregular heartbeat or chest pain  -redness, blistering, peeling or loosening of the skin, including inside the mouth  -trouble breathing  -unusual bruising or bleeding  -vomiting  -yellowing of the eyes or skin  Side effects that usually do not require medical attention (report to your doctor or health care professional if they continue or are bothersome):  -changes in how food tastes  -diarrhea  -headache  -stomach upset or nausea  This list may not describe all possible side effects. Call your doctor for medical advice about side effects. You may report side effects to FDA at 4-000-FDA-4749.  Where should I keep my medicine?  Keep out of the reach of children.  Store at room  temperature below 30 degrees C (86 degrees F). Throw away any medicine after the expiration date.  NOTE: This sheet is a summary. It may not cover all possible information. If you have questions about this medicine, talk to your doctor, pharmacist, or health care provider.  © 2014, [employer]. (9/17/2013 3:15:11 PM)

## 2017-10-25 NOTE — PROGRESS NOTES
Subjective:     Chief Complaint   Patient presents with   • Vaginal Itching     x 1 month        HPI:  Elizabeth Blunt is a 62 y.o. female here today to discuss the following:    Candida infection  Patient reports an itching rash in her groin areas and under her stomach for more than a month. She has tried OTC cream without relief.         Current medicines (including changes today)  Current Outpatient Prescriptions   Medication Sig Dispense Refill   • nystatin (MYCOSTATIN) 957626 UNIT/GM Cream topical cream Apply topically to affected area twice daily 8.5 g 1   • fluconazole (DIFLUCAN) 100 MG Tab Take 1 Tab by mouth every day for 5 days. 5 Tab 0   • metformin (GLUCOPHAGE) 1000 MG tablet TAKE 1 TAB BY MOUTH 2 TIMES A DAY, WITH MEALS. 180 Tab 0   • losartan-hydrochlorothiazide (HYZAAR) 100-25 MG per tablet TAKE 1 TAB BY MOUTH ONCE DAILY 90 Tab 0   • amlodipine (NORVASC) 5 MG Tab TAKE 1 TABLET BY MOUTH EVERY DAY 90 Tab 0   • simvastatin (ZOCOR) 40 MG Tab TAKE 1 TABLET BY MOUTH IN THE EVENING 90 Tab 0   • hydrocodone-acetaminophen (NORCO) 5-325 MG Tab per tablet Take 1-2 Tabs by mouth every four hours as needed. 20 Tab 0   • omeprazole (PRILOSEC) 20 MG delayed-release capsule Take 1 Cap by mouth every day. 30 Cap 11   • Cholecalciferol (VITAMIN D PO) Take  by mouth.     • sumatriptan (IMITREX) 100 MG tablet Take 1 Tab by mouth Once PRN for Migraine for up to 1 dose. 10 Tab 3     No current facility-administered medications for this visit.        She  has a past medical history of Allergic rhinitis; Dental disorder; Diabetes; Dyslipidemia; Gynecological disorder; High cholesterol; HTN; Low back pain; and Migraine headache.    ROS   Review of Systems   Constitutional: Negative for fever, chills, weight loss and malaise/fatigue.   HENT: Negative for ear pain, nosebleeds, congestion, sore throat and neck pain.    Respiratory: Negative for cough, sputum production, shortness of breath and wheezing.    Cardiovascular:  "Negative for chest pain, palpitations,  and leg swelling.   Skin: Positive for itching rash   All other systems reviewed and are negative except as in HPI.     Objective:   Physical Exam:  Blood pressure 130/90, pulse 96, temperature 36.8 °C (98.2 °F), resp. rate 14, height 1.6 m (5' 2.99\"), weight 67.9 kg (149 lb 9.6 oz), last menstrual period 12/05/2002, SpO2 98 %. Body mass index is 26.51 kg/m².   General:  Well nourished, well developed in NAD  Head is grossly normal.  Neck: Supple without JVD   Pulmonary:  Normal effort.  Cardiovascular: Regular rate   Extremities: no clubbing, cyanosis, or edema.  Skin: moist, erythematous areas in folds of skin in bilateral groin and suprapubic area with few satellite lesions, and white shiny borders.    Assessment and Plan:   The following treatment plan was discussed   1. Candida infection  nystatin (MYCOSTATIN) 514133 UNIT/GM Cream topical cream    fluconazole (DIFLUCAN) 100 MG Tab       Followup: Return in about 5 weeks (around 11/29/2017), or if symptoms worsen or fail to improve, for chronic disease management.   Please note that this dictation was created using voice recognition software. I have made every reasonable attempt to correct obvious errors, but I expect that there are errors of grammar and possibly content that I did not discover before finalizing the note.           "

## 2017-10-30 NOTE — ASSESSMENT & PLAN NOTE
Patient reports an itching rash in her groin areas and under her stomach for more than a month. She has tried OTC cream without relief.

## 2017-11-26 RX ORDER — LOSARTAN POTASSIUM AND HYDROCHLOROTHIAZIDE 25; 100 MG/1; MG/1
TABLET ORAL
Qty: 90 TAB | Refills: 0 | Status: SHIPPED | OUTPATIENT
Start: 2017-11-26 | End: 2018-04-03 | Stop reason: SDUPTHER

## 2017-11-26 RX ORDER — AMLODIPINE BESYLATE 5 MG/1
TABLET ORAL
Qty: 90 TAB | Refills: 0 | Status: SHIPPED | OUTPATIENT
Start: 2017-11-26 | End: 2018-04-03 | Stop reason: SDUPTHER

## 2017-11-28 NOTE — TELEPHONE ENCOUNTER
Was the patient seen in the last year in this department? Yes     Does patient have an active prescription for medications requested? No     Received Request Via: Pharmacy      Pt met protocol?: Yes    O/V 10/17   A1C 9/17

## 2017-12-02 ENCOUNTER — HOSPITAL ENCOUNTER (OUTPATIENT)
Dept: LAB | Facility: MEDICAL CENTER | Age: 62
End: 2017-12-02
Attending: NURSE PRACTITIONER
Payer: COMMERCIAL

## 2017-12-02 DIAGNOSIS — E11.9 DIABETES MELLITUS WITHOUT COMPLICATION (HCC): ICD-10-CM

## 2017-12-02 LAB
ALBUMIN SERPL BCP-MCNC: 4 G/DL (ref 3.2–4.9)
ALBUMIN/GLOB SERPL: 1.3 G/DL
ALP SERPL-CCNC: 101 U/L (ref 30–99)
ALT SERPL-CCNC: 31 U/L (ref 2–50)
ANION GAP SERPL CALC-SCNC: 11 MMOL/L (ref 0–11.9)
AST SERPL-CCNC: 25 U/L (ref 12–45)
BILIRUB SERPL-MCNC: 0.7 MG/DL (ref 0.1–1.5)
BUN SERPL-MCNC: 12 MG/DL (ref 8–22)
CALCIUM SERPL-MCNC: 9.6 MG/DL (ref 8.5–10.5)
CHLORIDE SERPL-SCNC: 103 MMOL/L (ref 96–112)
CO2 SERPL-SCNC: 24 MMOL/L (ref 20–33)
CREAT SERPL-MCNC: 0.57 MG/DL (ref 0.5–1.4)
EST. AVERAGE GLUCOSE BLD GHB EST-MCNC: 206 MG/DL
GFR SERPL CREATININE-BSD FRML MDRD: >60 ML/MIN/1.73 M 2
GLOBULIN SER CALC-MCNC: 3.2 G/DL (ref 1.9–3.5)
GLUCOSE SERPL-MCNC: 170 MG/DL (ref 65–99)
HBA1C MFR BLD: 8.8 % (ref 0–5.6)
POTASSIUM SERPL-SCNC: 4.2 MMOL/L (ref 3.6–5.5)
PROT SERPL-MCNC: 7.2 G/DL (ref 6–8.2)
SODIUM SERPL-SCNC: 138 MMOL/L (ref 135–145)

## 2017-12-02 PROCEDURE — 83036 HEMOGLOBIN GLYCOSYLATED A1C: CPT

## 2017-12-02 PROCEDURE — 80053 COMPREHEN METABOLIC PANEL: CPT

## 2017-12-02 PROCEDURE — 36415 COLL VENOUS BLD VENIPUNCTURE: CPT

## 2017-12-21 ENCOUNTER — OFFICE VISIT (OUTPATIENT)
Dept: MEDICAL GROUP | Facility: PHYSICIAN GROUP | Age: 62
End: 2017-12-21
Payer: COMMERCIAL

## 2017-12-21 VITALS
SYSTOLIC BLOOD PRESSURE: 130 MMHG | TEMPERATURE: 97.7 F | OXYGEN SATURATION: 97 % | WEIGHT: 154 LBS | BODY MASS INDEX: 27.29 KG/M2 | HEART RATE: 78 BPM | HEIGHT: 63 IN | DIASTOLIC BLOOD PRESSURE: 82 MMHG | RESPIRATION RATE: 16 BRPM

## 2017-12-21 DIAGNOSIS — E11.9 DIABETES MELLITUS WITHOUT COMPLICATION (HCC): Chronic | ICD-10-CM

## 2017-12-21 DIAGNOSIS — I10 ESSENTIAL HYPERTENSION: Chronic | ICD-10-CM

## 2017-12-21 DIAGNOSIS — E78.5 DYSLIPIDEMIA: Chronic | ICD-10-CM

## 2017-12-21 PROCEDURE — 99214 OFFICE O/P EST MOD 30 MIN: CPT | Performed by: NURSE PRACTITIONER

## 2017-12-21 RX ORDER — SIMVASTATIN 40 MG
TABLET ORAL
Qty: 90 TAB | Refills: 3 | Status: SHIPPED | OUTPATIENT
Start: 2017-12-21 | End: 2019-01-09 | Stop reason: SDUPTHER

## 2017-12-22 NOTE — ASSESSMENT & PLAN NOTE
Chronic condition: Patient has type 2 diabetes and does not check her blood sugar at home. She denies any tremors, anxiety, weakness, headache, dizziness, blurry vision  Fatigue, irritability or palpitations. She was scheduled for a visit with a diabetic educator and the appointment was canceled. She has been unable to get rescheduled prior to May 2017.  Hemoglobin A1c done in December 2017 has decreased to 8.8 from 10.3 in September 2017.  Lab Results   Component Value Date/Time    HBA1C 8.8 (H) 12/02/2017 09:10 AM      Lab Results   Component Value Date/Time    SODIUM 138 12/02/2017 09:10 AM    POTASSIUM 4.2 12/02/2017 09:10 AM    CHLORIDE 103 12/02/2017 09:10 AM    CO2 24 12/02/2017 09:10 AM    GLUCOSE 170 (H) 12/02/2017 09:10 AM    BUN 12 12/02/2017 09:10 AM    CREATININE 0.57 12/02/2017 09:10 AM    CREATININE 0.68 02/09/2011 08:05 AM    BUNCREATRAT 19 02/09/2011 08:05 AM    GLOMRATE >59 02/09/2011 08:05 AM

## 2017-12-22 NOTE — PROGRESS NOTES
Subjective:     Chief Complaint   Patient presents with   • Follow-Up     diabetes       HPI:  Elizabeth Blunt is a 62 y.o. female here today to discuss the following:    Diabetes mellitus without complication  Chronic condition: Patient has type 2 diabetes and does not check her blood sugar at home. She denies any tremors, anxiety, weakness, headache, dizziness, blurry vision  Fatigue, irritability or palpitations. She was scheduled for a visit with a diabetic educator and the appointment was canceled. She has been unable to get rescheduled prior to May 2017.  Hemoglobin A1c done in December 2017 has decreased to 8.8 from 10.3 in September 2017.  Lab Results   Component Value Date/Time    HBA1C 8.8 (H) 12/02/2017 09:10 AM      Lab Results   Component Value Date/Time    SODIUM 138 12/02/2017 09:10 AM    POTASSIUM 4.2 12/02/2017 09:10 AM    CHLORIDE 103 12/02/2017 09:10 AM    CO2 24 12/02/2017 09:10 AM    GLUCOSE 170 (H) 12/02/2017 09:10 AM    BUN 12 12/02/2017 09:10 AM    CREATININE 0.57 12/02/2017 09:10 AM    CREATININE 0.68 02/09/2011 08:05 AM    BUNCREATRAT 19 02/09/2011 08:05 AM    GLOMRATE >59 02/09/2011 08:05 AM        Dyslipidemia  Chronic Condition: Patient has hyperlipidemia and is currently taking simvastatin 40 mg daily. She denies any chest pain, diaphoresis, shortness of breath, headaches, dizziness, blurred vision or myalgias.   Lab Results   Component Value Date/Time    CHOLSTRLTOT 206 (H) 09/13/2017 09:52 AM     (H) 09/13/2017 09:52 AM    HDL 63 09/13/2017 09:52 AM    TRIGLYCERIDE 129 09/13/2017 09:52 AM       Lab Results   Component Value Date/Time    SODIUM 138 12/02/2017 09:10 AM    POTASSIUM 4.2 12/02/2017 09:10 AM    CHLORIDE 103 12/02/2017 09:10 AM    CO2 24 12/02/2017 09:10 AM    GLUCOSE 170 (H) 12/02/2017 09:10 AM    BUN 12 12/02/2017 09:10 AM    CREATININE 0.57 12/02/2017 09:10 AM    CREATININE 0.68 02/09/2011 08:05 AM    BUNCREATRAT 19 02/09/2011 08:05 AM    GLOMRATE >59 02/09/2011  08:05 AM     Lab Results   Component Value Date/Time    ALKPHOSPHAT 101 (H) 12/02/2017 09:10 AM    ASTSGOT 25 12/02/2017 09:10 AM    ALTSGPT 31 12/02/2017 09:10 AM    TBILIRUBIN 0.7 12/02/2017 09:10 AM            Essential hypertension  Chronic condition: Patient's treated for hypertension with amlodipine and Hyzaar. She denies any chest pain, headache dizziness,blurred vision, or lower extremity edema.  Blood pressure today is 130/82.        Current medicines (including changes today)  Current Outpatient Prescriptions   Medication Sig Dispense Refill   • simvastatin (ZOCOR) 40 MG Tab TAKE 1 TABLET BY MOUTH IN THE EVENING 90 Tab 3   • metformin (GLUCOPHAGE) 1000 MG tablet TAKE 1 TAB BY MOUTH 2 TIMES A DAY, WITH MEALS. 180 Tab 0   • amlodipine (NORVASC) 5 MG Tab TAKE 1 TABLET BY MOUTH EVERY DAY 90 Tab 0   • losartan-hydrochlorothiazide (HYZAAR) 100-25 MG per tablet TAKE 1 TAB BY MOUTH ONCE DAILY 90 Tab 0   • nystatin (MYCOSTATIN) 001790 UNIT/GM Cream topical cream Apply topically to affected area twice daily 8.5 g 1   • hydrocodone-acetaminophen (NORCO) 5-325 MG Tab per tablet Take 1-2 Tabs by mouth every four hours as needed. 20 Tab 0   • omeprazole (PRILOSEC) 20 MG delayed-release capsule Take 1 Cap by mouth every day. 30 Cap 11   • Cholecalciferol (VITAMIN D PO) Take  by mouth.     • sumatriptan (IMITREX) 100 MG tablet Take 1 Tab by mouth Once PRN for Migraine for up to 1 dose. 10 Tab 3     No current facility-administered medications for this visit.        She  has a past medical history of Allergic rhinitis; Dental disorder; Diabetes; Dyslipidemia; Gynecological disorder; High cholesterol; HTN; Low back pain; and Migraine headache.    ROS   Review of Systems   Constitutional: Negative for fever, chills, weight loss and malaise/fatigue.   HENT: Negative for ear pain, nosebleeds, congestion, sore throat and neck pain.    Respiratory: Negative for cough, sputum production, shortness of breath and wheezing.   "  Cardiovascular: Negative for chest pain, palpitations,  and leg swelling.   Gastrointestinal: Negative for heartburn, nausea, vomiting, diarrhea and abdominal pain.   Neurological: Negative for dizziness, tingling, tremors, sensory change, focal weakness and headaches.   Psychiatric/Behavioral: Negative for depression, anxiety, suicidal ideas, insomnia and memory loss.    All other systems reviewed and are negative except as in HPI.   Objective:   Physical Exam:  Blood pressure 130/82, pulse 78, temperature 36.5 °C (97.7 °F), resp. rate 16, height 1.6 m (5' 3\"), weight 69.9 kg (154 lb), last menstrual period 12/05/2002, SpO2 97 %. Body mass index is 27.28 kg/m².   Physical Exam:  Alert, oriented in no acute distress.  Eye contact is good, speech goal directed, affect calm  HEENT: conjunctiva non-injected, sclera non-icteric.  Pinna normal.   Neck No adenopathy or masses in the neck or supraclavicular regions.  Lungs: clear to auscultation bilaterally with good excursion.  CV: regular rate and rhythm.   Abdomen: soft, nontender, No CVAT. Normal bowel sounds.  Ext: no edema, color normal, vascularity normal, temperature normal  MS: Normal gait and station    Health Maintenance Due   Topic Date Due   • IMM HEP B VACCINE (1 of 3 - Primary Series) 1955   • IMM ZOSTER VACCINE  05/26/2017   • IMM INFLUENZA (1) 09/01/2017     Assessment and Plan:   The following treatment plan was discussed   1. Dyslipidemia  simvastatin (ZOCOR) 40 MG Tab    Continue current medication   2. Diabetes mellitus without complication ([facility])  HEMOGLOBIN A1C    LIPID PROFILE    MICROALBUMIN CREAT RATIO URINE    COMP METABOLIC PANEL    Continue current medication and follow up with diabetic educator. Labs ordered   3. Essential hypertension      Continue current medication       Followup: Return in about 3 months (around 3/21/2018).   Please note that this dictation was created using voice recognition software. I have made every " reasonable attempt to correct obvious errors, but I expect that there are errors of grammar and possibly content that I did not discover before finalizing the note.

## 2017-12-22 NOTE — ASSESSMENT & PLAN NOTE
Chronic Condition: Patient has hyperlipidemia and is currently taking simvastatin 40 mg daily. She denies any chest pain, diaphoresis, shortness of breath, headaches, dizziness, blurred vision or myalgias.   Lab Results   Component Value Date/Time    CHOLSTRLTOT 206 (H) 09/13/2017 09:52 AM     (H) 09/13/2017 09:52 AM    HDL 63 09/13/2017 09:52 AM    TRIGLYCERIDE 129 09/13/2017 09:52 AM       Lab Results   Component Value Date/Time    SODIUM 138 12/02/2017 09:10 AM    POTASSIUM 4.2 12/02/2017 09:10 AM    CHLORIDE 103 12/02/2017 09:10 AM    CO2 24 12/02/2017 09:10 AM    GLUCOSE 170 (H) 12/02/2017 09:10 AM    BUN 12 12/02/2017 09:10 AM    CREATININE 0.57 12/02/2017 09:10 AM    CREATININE 0.68 02/09/2011 08:05 AM    BUNCREATRAT 19 02/09/2011 08:05 AM    GLOMRATE >59 02/09/2011 08:05 AM     Lab Results   Component Value Date/Time    ALKPHOSPHAT 101 (H) 12/02/2017 09:10 AM    ASTSGOT 25 12/02/2017 09:10 AM    ALTSGPT 31 12/02/2017 09:10 AM    TBILIRUBIN 0.7 12/02/2017 09:10 AM

## 2017-12-22 NOTE — ASSESSMENT & PLAN NOTE
Chronic condition: Patient's treated for hypertension with amlodipine and Hyzaar. She denies any chest pain, headache dizziness,blurred vision, or lower extremity edema.  Blood pressure today is 130/82.

## 2018-01-22 ENCOUNTER — HOSPITAL ENCOUNTER (OUTPATIENT)
Dept: RADIOLOGY | Facility: MEDICAL CENTER | Age: 63
End: 2018-01-22
Attending: NURSE PRACTITIONER
Payer: COMMERCIAL

## 2018-01-22 DIAGNOSIS — Z12.39 SCREENING BREAST EXAMINATION: ICD-10-CM

## 2018-01-22 PROCEDURE — 77067 SCR MAMMO BI INCL CAD: CPT

## 2018-02-27 ENCOUNTER — OFFICE VISIT (OUTPATIENT)
Dept: MEDICAL GROUP | Facility: PHYSICIAN GROUP | Age: 63
End: 2018-02-27
Payer: COMMERCIAL

## 2018-02-27 VITALS
HEIGHT: 63 IN | RESPIRATION RATE: 14 BRPM | WEIGHT: 156 LBS | BODY MASS INDEX: 27.64 KG/M2 | DIASTOLIC BLOOD PRESSURE: 90 MMHG | TEMPERATURE: 97.7 F | OXYGEN SATURATION: 96 % | HEART RATE: 73 BPM | SYSTOLIC BLOOD PRESSURE: 124 MMHG

## 2018-02-27 DIAGNOSIS — J30.9 ALLERGIC RHINITIS, UNSPECIFIED CHRONICITY, UNSPECIFIED SEASONALITY, UNSPECIFIED TRIGGER: ICD-10-CM

## 2018-02-27 DIAGNOSIS — K21.9 GASTROESOPHAGEAL REFLUX DISEASE WITHOUT ESOPHAGITIS: ICD-10-CM

## 2018-02-27 DIAGNOSIS — Z23 NEED FOR VACCINATION: ICD-10-CM

## 2018-02-27 DIAGNOSIS — M25.512 LEFT SHOULDER PAIN, UNSPECIFIED CHRONICITY: ICD-10-CM

## 2018-02-27 DIAGNOSIS — G43.909 MIGRAINE WITHOUT STATUS MIGRAINOSUS, NOT INTRACTABLE, UNSPECIFIED MIGRAINE TYPE: ICD-10-CM

## 2018-02-27 DIAGNOSIS — K22.70 BARRETT'S ESOPHAGUS WITHOUT DYSPLASIA: ICD-10-CM

## 2018-02-27 DIAGNOSIS — I10 ESSENTIAL HYPERTENSION: ICD-10-CM

## 2018-02-27 DIAGNOSIS — E11.9 DIABETES MELLITUS WITHOUT COMPLICATION (HCC): ICD-10-CM

## 2018-02-27 PROCEDURE — 90471 IMMUNIZATION ADMIN: CPT | Performed by: INTERNAL MEDICINE

## 2018-02-27 PROCEDURE — 90746 HEPB VACCINE 3 DOSE ADULT IM: CPT | Performed by: INTERNAL MEDICINE

## 2018-02-27 PROCEDURE — 99204 OFFICE O/P NEW MOD 45 MIN: CPT | Mod: 25 | Performed by: INTERNAL MEDICINE

## 2018-02-27 PROCEDURE — 90670 PCV13 VACCINE IM: CPT | Performed by: INTERNAL MEDICINE

## 2018-02-27 PROCEDURE — 90472 IMMUNIZATION ADMIN EACH ADD: CPT | Performed by: INTERNAL MEDICINE

## 2018-02-27 RX ORDER — LANCETS 30 GAUGE
EACH MISCELLANEOUS
Qty: 100 EACH | Refills: 1 | Status: SHIPPED | OUTPATIENT
Start: 2018-02-27 | End: 2020-03-26 | Stop reason: SDUPTHER

## 2018-02-27 RX ORDER — GLIPIZIDE 5 MG/1
5 TABLET ORAL 2 TIMES DAILY
Qty: 120 TAB | Refills: 1 | Status: SHIPPED | OUTPATIENT
Start: 2018-02-27 | End: 2018-04-24

## 2018-02-27 RX ORDER — ASPIRIN 81 MG/1
81 TABLET, CHEWABLE ORAL DAILY
Qty: 90 TAB | Refills: 1 | Status: SHIPPED | OUTPATIENT
Start: 2018-02-27 | End: 2018-11-26 | Stop reason: SDUPTHER

## 2018-02-27 RX ORDER — FLUTICASONE PROPIONATE 50 MCG
1 SPRAY, SUSPENSION (ML) NASAL DAILY
Qty: 16 G | Refills: 1 | Status: SHIPPED | OUTPATIENT
Start: 2018-02-27 | End: 2020-03-26

## 2018-02-28 NOTE — ASSESSMENT & PLAN NOTE
On amlodipine 5 mg and Hyzaar 100-25. Denies chest pain, shortness of breath, lightheadedness and dizziness.

## 2018-02-28 NOTE — PROGRESS NOTES
PRIMARY CARE CLINIC NEW PATIENT H&P  Chief Complaint   Patient presents with   • Diabetes     type 2- Metformin Refill        History of Present Illness     Diabetes mellitus without complication  Has been taking metformin for 2-3 years. She doesn't measure her blood sugars at home. She knows how to take blood sugars but doesn't have a glucometer. She's been trying to see nutrition but those appointments were cancelled. Denies neuropathy and follows with her eye doctor regularly.     Essential hypertension  On amlodipine 5 mg and Hyzaar 100-25. Denies chest pain, shortness of breath, lightheadedness and dizziness.     Migraine headache  Has a migraine once a month. Takes imitrex as needed. Migraines associated with photophobia, phonophobia, nausea, and vomiting.     Dalton's esophagus  Due for another EGD 3/6/2018. Gastroenterology is also planning for another colonoscopy.     GERD (Gastroesophageal Reflux Disease)  Has omeprazole available and was taking it daily up until 2 months ago. Now only takes omeprazole as needed. Following with gastroenterology consultants and has another EGD scheduled for next month 3/6/2018.     Allergic Rhinitis  Uses flonase for symptoms as needed.     Left shoulder pain  Had left rotator cuff repair last summer with Dr. Kenyon at [facility] but having persistent post operative pain. He is prescribing her pain medications which she uses intermittently.       Current Outpatient Prescriptions   Medication Sig Dispense Refill   • Blood Glucose Monitoring Suppl Device Meter: Dispense Device of Insurance Preference. Sig. Use as directed for blood sugar monitoring. #1. NR. 1 Device 0   • Blood Glucose Monitoring Suppl Supplies Misc Test strips order: Test strips for insurance approved meter. Sig: use daily and prn ssx high or low sugar #100 RF x 3 100 Units 1   • Lancets Misc Lancets order: Lancets for insurance approved meter meter. Sig: use daily and prn ssx high or low sugar. #100 RF x 3 100  Each 1   • glipiZIDE (GLUCOTROL) 5 MG Tab Take 1 Tab by mouth 2 times a day. 120 Tab 1   • aspirin (ASPIRIN 81) 81 MG Chew Tab chewable tablet Take 1 Tab by mouth every day. 90 Tab 1   • fluticasone (FLONASE) 50 MCG/ACT nasal spray Spray 1 Spray in nose every day. 16 g 1   • metformin (GLUCOPHAGE) 1000 MG tablet Take 1 Tab by mouth 2 times a day, with meals. 180 Tab 1   • simvastatin (ZOCOR) 40 MG Tab TAKE 1 TABLET BY MOUTH IN THE EVENING 90 Tab 3   • amlodipine (NORVASC) 5 MG Tab TAKE 1 TABLET BY MOUTH EVERY DAY 90 Tab 0   • losartan-hydrochlorothiazide (HYZAAR) 100-25 MG per tablet TAKE 1 TAB BY MOUTH ONCE DAILY 90 Tab 0   • omeprazole (PRILOSEC) 20 MG delayed-release capsule Take 1 Cap by mouth every day. 30 Cap 11   • Cholecalciferol (VITAMIN D PO) Take  by mouth.     • sumatriptan (IMITREX) 100 MG tablet Take 1 Tab by mouth Once PRN for Migraine for up to 1 dose. 10 Tab 3   • nystatin (MYCOSTATIN) 131847 UNIT/GM Cream topical cream Apply topically to affected area twice daily 8.5 g 1   • hydrocodone-acetaminophen (NORCO) 5-325 MG Tab per tablet Take 1-2 Tabs by mouth every four hours as needed. 20 Tab 0     No current facility-administered medications for this visit.        Past Medical History:   Diagnosis Date   • Allergic rhinitis    • Dalton's esophagus 4/14/2017   • Dental disorder     partial upper   • Diabetes mellitus without complication (CMS-HCC) 11/24/2015   • Dyslipidemia    • Essential hypertension 8/25/2015   • GERD (gastroesophageal reflux disease) 5/7/2009   • HTN    • Low back pain    • Migraine headache      Past Surgical History:   Procedure Laterality Date   • ROTATOR CUFF REPAIR Left 08/2017   • OREN BY LAPAROSCOPY Bilateral 8/3/2016    Procedure: OREN BY LAPAROSCOPY;  Surgeon: Scott Canales M.D.;  Location: SURGERY SAME DAY [facility];  Service:    • KNEE ARTHROSCOPY  2/13/2014    Performed by Wiley Kenyon M.D. at [facility]   • MEDIAL MENISCECTOMY  2/13/2014     "Performed by Wiley Kenyon M.D. at SURGERY [facility]S   • SEPTOTURBINOPLASTY  3/30/2010    Performed by TRAV MENJIVAR at SURGERY SAME DAY [facility] ORS   • SIGMOIDOSCOPY FLEX  3/10    normal   • COLONOSCOPY  3/09    normal   • ANKLE ORIF     • APPENDECTOMY     • ABDOMINAL HYSTERECTOMY TOTAL      partial due to fibroids     Social History   Substance Use Topics   • Smoking status: Former Smoker     Packs/day: 0.50     Years: 20.00     Types: Cigarettes     Quit date: 2006   • Smokeless tobacco: Never Used      Comment: 5 cig's a day for 20 yrs quit in    • Alcohol use 0.0 oz/week      Comment: 2 per month,  beer     Social History     Social History Narrative     at [employer]     Family History   Problem Relation Age of Onset   • Hypertension Mother    • Cancer Mother 85     colon   • Hypertension Father    • Cancer Father      prostate     Family Status   Relation Status   • Mother  at age 96 y.o.    pneumonia   • Father Alive   • Sister Alive   • Sister Alive     Allergies: Patient has no known allergies.    ROS  Constitutional: Negative for fatigue/generalized weakness.   HEENT: Negative for  vision changes, hearing changes    Respiratory: Negative for shortness of breath  Cardiovascular: Negative for chest pain, palpitations  Gastrointestinal: Negative for blood in stool, constipation, diarrhea  Genitourinary: Negative for dysuria, polyuria  Musculoskeletal: Negative for myalgias, back pain. Positive for left shoulder pain as per HPI  Skin: Negative for rash  Neurological: Negative for numbness, tingling  Psychiatric/Behavioral: Negative for depression, anxiety     Objective   Blood pressure 124/90, pulse 73, temperature 36.5 °C (97.7 °F), resp. rate 14, height 1.6 m (5' 3\"), weight 70.8 kg (156 lb), last menstrual period 2002, SpO2 96 %. Body mass index is 27.63 kg/m².    General: Alert, oriented. In no acute distress   HEET: EOMI, PERRL, conjunctiva non-injected, sclera " non-icteric.  Nares patent with no significant congestion or drainage.  Libby pinnae, external auditory canals, TM pearly gray with normal light reflex bilaterally.Oral mucous membranes pink and moist with no lesions.  Neck: supple with no cervical, subclavicular lymphadenopathy, JVD, palpable thyroid nodules   Lungs: clear to auscultation bilaterally with good excursion.  CV: regular rate and rhythm.  Abdomen soft, non-distended, non-tender with normal bowel sounds. No hepatosplenomegaly, no masses palpated  Skin: no lesions. Warm, dry   Psychiatric: appropriate mood and affect     Assessment and Plan   The following treatment plan was discussed     1. Diabetes mellitus without complication (CMS-HCC)  Presents for follow up of diabetes mellitus. She indicates that she is feeling well and denies any symptoms referable to this diagnoses. She takes metformin 1 g bid, will start baby aspirin and glipizide 5 mg bid given she isn't as goal as per her HgbA1c below. Also prescribing meter (patient has attended diabetes education and knows how to take her blood sugars) and asked patient to record daily fasting sugars and bring log to next visit in 2 months. Also warned her about the side affect of hypoglycemia with glipizide and advised her not to skip meals.     Lab Results   Component Value Date/Time    HBA1C 8.8 (H) 12/02/2017 09:10 AM      Diabetic complications: none  ACR Albumin/Creatinine Ratio goal <30   HTN: Blood pressure goal <130/<80 . Currently Rx ACE/ARB: Yes  Hyperlipidemia: Cholesterol goal LDL <100, total/HDL <5 . Currently Rx Statin: Yes  Last eye exam 4/26/2017    Last monofilament foot exam: 4/16/2018     - Blood Glucose Monitoring Suppl Device; Meter: Dispense Device of Insurance Preference. Sig. Use as directed for blood sugar monitoring. #1. NR.  Dispense: 1 Device; Refill: 0  - Blood Glucose Monitoring Suppl Supplies Misc; Test strips order: Test strips for insurance approved meter. Sig: use daily  and prn ssx high or low sugar #100 RF x 3  Dispense: 100 Units; Refill: 1  - Lancets Misc; Lancets order: Lancets for insurance approved meter meter. Sig: use daily and prn ssx high or low sugar. #100 RF x 3  Dispense: 100 Each; Refill: 1  - glipiZIDE (GLUCOTROL) 5 MG Tab; Take 1 Tab by mouth 2 times a day.  Dispense: 120 Tab; Refill: 1  - aspirin (ASPIRIN 81) 81 MG Chew Tab chewable tablet; Take 1 Tab by mouth every day.  Dispense: 90 Tab; Refill: 1  - metformin (GLUCOPHAGE) 1000 MG tablet; Take 1 Tab by mouth 2 times a day, with meals.  Dispense: 180 Tab; Refill: 1    2. Essential hypertension  Well controlled on amlodipine and Hyzaar, blood pressure today 124/90.     3. Migraine without status migrainosus, not intractable, unspecified migraine type  Takes imitrex as needed, has symptoms monthly.     4. Dalton's esophagus without dysplasia  Following with Dr. Escobedo in gastroenterology consultants, has EGD scheduled for 3/6/2018.     5. Gastroesophageal reflux disease without esophagitis  Taking omeprazole as needed, following with Dr. Escobedo as above.     6. Need for vaccination  Received 3/3 hepatitis B vaccine and prevnar today (has already received pneumococcal).   - HEPATITIS B VACCINE ADULT IM  - PNEUMOCOCCAL CONJUGATE VACCINE 13-VALENT    7. Allergic rhinitis, unspecified chronicity, unspecified seasonality, unspecified trigger  Needs flonase for symptoms as needed.   - fluticasone (FLONASE) 50 MCG/ACT nasal spray; Spray 1 Spray in nose every day.  Dispense: 16 g; Refill: 1    8. Left shoulder pain, unspecified chronicity  Had repair of left rotator cuff last year with Dr. Kenyon at [facility] and he is prescribing her pain medications which she takes on a prn basis.       Return in about 2 months (around 4/27/2018).    Health Maintenance      Health Maintenance Due   Topic Date Due   • IMM ZOSTER VACCINE  05/26/2017     Nehemias Giles MD  Internal Medicine  [facility]

## 2018-02-28 NOTE — ASSESSMENT & PLAN NOTE
Has omeprazole available and was taking it daily up until 2 months ago. Now only takes omeprazole as needed. Following with gastroenterology consultants and has another EGD scheduled for next month 3/6/2018.

## 2018-02-28 NOTE — ASSESSMENT & PLAN NOTE
Has been taking metformin for 2-3 years. She doesn't measure her blood sugars at home. She knows how to take blood sugars but doesn't have a glucometer. She's been trying to see nutrition but those appointments were cancelled. Denies neuropathy and follows with her eye doctor regularly.

## 2018-02-28 NOTE — ASSESSMENT & PLAN NOTE
Had left rotator cuff repair last summer with Dr. Kenyon at [facility] but having persistent post operative pain. He is prescribing her pain medications which she uses intermittently.

## 2018-02-28 NOTE — ASSESSMENT & PLAN NOTE
Has a migraine once a month. Takes imitrex as needed. Migraines associated with photophobia, phonophobia, nausea, and vomiting.

## 2018-04-03 NOTE — TELEPHONE ENCOUNTER
Was the patient seen in the last year in this department? Yes     Does patient have an active prescription for medications requested? No     Received Request Via: Pharmacy    Pt met protocol?: Yes     Last OV 02/2018  BP Readings from Last 1 Encounters:   02/27/18 124/90

## 2018-04-04 RX ORDER — AMLODIPINE BESYLATE 5 MG/1
5 TABLET ORAL
Qty: 90 TAB | Refills: 0 | Status: SHIPPED | OUTPATIENT
Start: 2018-04-04 | End: 2018-07-05 | Stop reason: SDUPTHER

## 2018-04-04 RX ORDER — LOSARTAN POTASSIUM AND HYDROCHLOROTHIAZIDE 25; 100 MG/1; MG/1
TABLET ORAL
Qty: 90 TAB | Refills: 0 | Status: SHIPPED | OUTPATIENT
Start: 2018-04-04 | End: 2018-07-05 | Stop reason: SDUPTHER

## 2018-04-21 ENCOUNTER — HOSPITAL ENCOUNTER (OUTPATIENT)
Dept: LAB | Facility: MEDICAL CENTER | Age: 63
End: 2018-04-21
Attending: NURSE PRACTITIONER
Payer: COMMERCIAL

## 2018-04-21 DIAGNOSIS — E11.9 DIABETES MELLITUS WITHOUT COMPLICATION (HCC): Chronic | ICD-10-CM

## 2018-04-21 LAB
ALBUMIN SERPL BCP-MCNC: 3.7 G/DL (ref 3.2–4.9)
ALBUMIN/GLOB SERPL: 1.1 G/DL
ALP SERPL-CCNC: 82 U/L (ref 30–99)
ALT SERPL-CCNC: 26 U/L (ref 2–50)
ANION GAP SERPL CALC-SCNC: 10 MMOL/L (ref 0–11.9)
AST SERPL-CCNC: 22 U/L (ref 12–45)
BILIRUB SERPL-MCNC: 0.5 MG/DL (ref 0.1–1.5)
BUN SERPL-MCNC: 18 MG/DL (ref 8–22)
CALCIUM SERPL-MCNC: 9 MG/DL (ref 8.5–10.5)
CHLORIDE SERPL-SCNC: 103 MMOL/L (ref 96–112)
CHOLEST SERPL-MCNC: 170 MG/DL (ref 100–199)
CO2 SERPL-SCNC: 25 MMOL/L (ref 20–33)
CREAT SERPL-MCNC: 0.57 MG/DL (ref 0.5–1.4)
CREAT UR-MCNC: 194.2 MG/DL
EST. AVERAGE GLUCOSE BLD GHB EST-MCNC: 177 MG/DL
GLOBULIN SER CALC-MCNC: 3.5 G/DL (ref 1.9–3.5)
GLUCOSE SERPL-MCNC: 216 MG/DL (ref 65–99)
HBA1C MFR BLD: 7.8 % (ref 0–5.6)
HDLC SERPL-MCNC: 62 MG/DL
LDLC SERPL CALC-MCNC: 89 MG/DL
MICROALBUMIN UR-MCNC: 1.5 MG/DL
MICROALBUMIN/CREAT UR: 8 MG/G (ref 0–30)
POTASSIUM SERPL-SCNC: 4.1 MMOL/L (ref 3.6–5.5)
PROT SERPL-MCNC: 7.2 G/DL (ref 6–8.2)
SODIUM SERPL-SCNC: 138 MMOL/L (ref 135–145)
TRIGL SERPL-MCNC: 93 MG/DL (ref 0–149)

## 2018-04-21 PROCEDURE — 80061 LIPID PANEL: CPT

## 2018-04-21 PROCEDURE — 82043 UR ALBUMIN QUANTITATIVE: CPT

## 2018-04-21 PROCEDURE — 80053 COMPREHEN METABOLIC PANEL: CPT

## 2018-04-21 PROCEDURE — 36415 COLL VENOUS BLD VENIPUNCTURE: CPT

## 2018-04-21 PROCEDURE — 82570 ASSAY OF URINE CREATININE: CPT

## 2018-04-21 PROCEDURE — 83036 HEMOGLOBIN GLYCOSYLATED A1C: CPT

## 2018-04-24 ENCOUNTER — OFFICE VISIT (OUTPATIENT)
Dept: MEDICAL GROUP | Facility: PHYSICIAN GROUP | Age: 63
End: 2018-04-24
Payer: COMMERCIAL

## 2018-04-24 VITALS
DIASTOLIC BLOOD PRESSURE: 90 MMHG | HEIGHT: 63 IN | BODY MASS INDEX: 28.17 KG/M2 | SYSTOLIC BLOOD PRESSURE: 138 MMHG | OXYGEN SATURATION: 96 % | RESPIRATION RATE: 16 BRPM | TEMPERATURE: 97.9 F | HEART RATE: 82 BPM | WEIGHT: 159 LBS

## 2018-04-24 DIAGNOSIS — E11.9 DIABETES MELLITUS WITHOUT COMPLICATION (HCC): ICD-10-CM

## 2018-04-24 DIAGNOSIS — R22.1 NECK SWELLING: ICD-10-CM

## 2018-04-24 DIAGNOSIS — R52 PAIN: ICD-10-CM

## 2018-04-24 PROCEDURE — 99214 OFFICE O/P EST MOD 30 MIN: CPT | Performed by: INTERNAL MEDICINE

## 2018-04-24 RX ORDER — TRAMADOL HYDROCHLORIDE 50 MG/1
TABLET ORAL
Refills: 0 | COMMUNITY
Start: 2018-03-21 | End: 2018-06-08

## 2018-04-24 RX ORDER — BLOOD-GLUCOSE METER
EACH MISCELLANEOUS
Refills: 0 | COMMUNITY
Start: 2018-02-27 | End: 2018-06-08 | Stop reason: CLARIF

## 2018-04-24 RX ORDER — GLIPIZIDE 10 MG/1
10 TABLET ORAL 2 TIMES DAILY
Qty: 60 TAB | Refills: 1 | Status: SHIPPED | OUTPATIENT
Start: 2018-04-24 | End: 2018-07-03 | Stop reason: SDUPTHER

## 2018-04-24 RX ORDER — NAPROXEN 500 MG/1
TABLET ORAL
Refills: 0 | COMMUNITY
Start: 2018-03-21 | End: 2020-11-18

## 2018-04-24 RX ORDER — LIDOCAINE 50 MG/G
PATCH TOPICAL
Refills: 0 | COMMUNITY
Start: 2018-02-22 | End: 2018-06-08

## 2018-04-24 RX ORDER — POLYETHYLENE GLYCOL 3350, SODIUM SULFATE ANHYDROUS, SODIUM BICARBONATE, SODIUM CHLORIDE, POTASSIUM CHLORIDE 236; 22.74; 6.74; 5.86; 2.97 G/4L; G/4L; G/4L; G/4L; G/4L
POWDER, FOR SOLUTION ORAL
Refills: 0 | COMMUNITY
Start: 2018-02-28 | End: 2018-12-10

## 2018-04-24 NOTE — PROGRESS NOTES
PRIMARY CARE CLINIC FOLLOW UP VISIT  Chief Complaint   Patient presents with   • Diabetes Mellitus     without complication- One Touch Strips Refill    • Neck Swelling       History of Present Illness     Diabetes mellitus without complication  When she last saw me 2/27/2018 she was started glipizide in addition to her metformin and asked her to record her blood sugars. Her fasting blood sugars are still 160-232. Says sometimes she feels anxious and starts sweating and her blood sugar was 90 during one of these episodes. She takes a piece of chocolate during these episodes and feels better. On the day she has had these episodes she hasn't missed a meal or eaten less. Says she only eats around 10 am and then in the evening at 5:30 - 6:00 pm. She doesn't eat snacks.     Neck swelling  Every time she swallows and even when she doesn't she feels pain to the left side of her neck. It feels like something is there.       Current Outpatient Prescriptions   Medication Sig Dispense Refill   • glipiZIDE (GLUCOTROL) 10 MG Tab Take 1 Tab by mouth 2 times a day. 60 Tab 1   • Blood Glucose Monitoring Suppl (ONE TOUCH ULTRA 2) w/Device Kit USE TO TEST BLOOD SUGAR AS DIRECTED  0   • ONE TOUCH ULTRA TEST strip USE TO TEST BLOOD SUGAR EVERY DAY AS NEEDED FOR HIGH OR LOW SUGAR  3   • lidocaine (LIDODERM) 5 % Patch APPLY 1 PATCH TO AFFECTED AREA 12 HOURS ON AND 12 HOURS OFF  0   • naproxen (NAPROSYN) 500 MG Tab TAKE 1 TAB BY MOUTH 2 TIMES DAILY WITH FOOD  0   • PEG 3350-KCl-NaBcb-NaCl-NaSulf (PEG-3350/ELECTROLYTES) 236 g Recon Soln USE AS DIRECTED BY DOCTOR  0   • tramadol (ULTRAM) 50 MG Tab TAKE 1 TO 2 TABS BY MOUTH EVERY 4 TO 6 HOURS AS NEEDED FOR PAIN FOR 5 DAYS  0   • glucose blood strip USE DAILY AND AS NEEDED FOR HIGH OR LOW SUGAR.  1   • amLODIPine (NORVASC) 5 MG Tab Take 1 Tab by mouth every day. 90 Tab 0   • losartan-hydrochlorothiazide (HYZAAR) 100-25 MG per tablet TAKE 1 TAB BY MOUTH ONCE DAILY 90 Tab 0   • Blood Glucose  Monitoring Suppl Device Meter: Dispense Device of Insurance Preference. Sig. Use as directed for blood sugar monitoring. #1. NR. 1 Device 0   • Blood Glucose Monitoring Suppl Supplies Misc Test strips order: Test strips for insurance approved meter. Sig: use daily and prn ssx high or low sugar #100 RF x 3 100 Units 1   • Lancets Misc Lancets order: Lancets for insurance approved meter meter. Sig: use daily and prn ssx high or low sugar. #100 RF x 3 100 Each 1   • aspirin (ASPIRIN 81) 81 MG Chew Tab chewable tablet Take 1 Tab by mouth every day. 90 Tab 1   • fluticasone (FLONASE) 50 MCG/ACT nasal spray Spray 1 Spray in nose every day. 16 g 1   • metformin (GLUCOPHAGE) 1000 MG tablet Take 1 Tab by mouth 2 times a day, with meals. 180 Tab 1   • simvastatin (ZOCOR) 40 MG Tab TAKE 1 TABLET BY MOUTH IN THE EVENING 90 Tab 3   • nystatin (MYCOSTATIN) 952205 UNIT/GM Cream topical cream Apply topically to affected area twice daily 8.5 g 1   • hydrocodone-acetaminophen (NORCO) 5-325 MG Tab per tablet Take 1-2 Tabs by mouth every four hours as needed. 20 Tab 0   • omeprazole (PRILOSEC) 20 MG delayed-release capsule Take 1 Cap by mouth every day. 30 Cap 11   • Cholecalciferol (VITAMIN D PO) Take  by mouth.     • sumatriptan (IMITREX) 100 MG tablet Take 1 Tab by mouth Once PRN for Migraine for up to 1 dose. 10 Tab 3     No current facility-administered medications for this visit.      Past Medical History:   Diagnosis Date   • Allergic rhinitis    • Dalton's esophagus 4/14/2017   • Dental disorder     partial upper   • Diabetes mellitus without complication (CMS-[facility]) 11/24/2015   • Dyslipidemia    • Essential hypertension 8/25/2015   • GERD (gastroesophageal reflux disease) 5/7/2009   • HTN    • Low back pain    • Migraine headache      Past Surgical History:   Procedure Laterality Date   • ROTATOR CUFF REPAIR Left 08/2017   • OREN BY LAPAROSCOPY Bilateral 8/3/2016    Procedure: OREN BY LAPAROSCOPY;  Surgeon: Scott Canales,  "M.D.;  Location: SURGERY SAME DAY [facility];  Service:    • KNEE ARTHROSCOPY  2014    Performed by Wiley Kenyon M.D. at SURGERY [facility]   • MEDIAL MENISCECTOMY  2014    Performed by Wiley Kenyon M.D. at SURGERY [facility]   • SEPTOTURBINOPLASTY  3/30/2010    Performed by TRAV MENJIVAR at SURGERY SAME DAY [facility] ORS   • SIGMOIDOSCOPY FLEX  3/10    normal   • COLONOSCOPY  3/09    normal   • ANKLE ORIF     • APPENDECTOMY     • ABDOMINAL HYSTERECTOMY TOTAL      partial due to fibroids     Social History   Substance Use Topics   • Smoking status: Former Smoker     Packs/day: 0.50     Years: 20.00     Types: Cigarettes     Quit date: 2006   • Smokeless tobacco: Never Used      Comment: 5 cig's a day for 20 yrs quit in    • Alcohol use 0.0 oz/week      Comment: 2 per month,  beer     Social History     Social History Narrative     at [facility]     Family History   Problem Relation Age of Onset   • Hypertension Mother    • Cancer Mother 85     colon   • Hypertension Father    • Cancer Father      prostate     Family Status   Relation Status   • Mother  at age 96 y.o.    pneumonia   • Father Alive   • Sister Alive   • Sister Alive     Allergies: Patient has no known allergies.    ROS  As per HPI above. All other systems reviewed and negative.        Objective   Blood pressure 138/90, pulse 82, temperature 36.6 °C (97.9 °F), resp. rate 16, height 1.6 m (5' 3\"), weight 72.1 kg (159 lb), last menstrual period 2002, SpO2 96 %. Body mass index is 28.17 kg/m².    General: alert and oriented, pleasant, cooperative  HEENT: Normocephalic, atraumatic. No thyroid masses. Tender slight swelling of left side of neck   Cardiovascular: regular rate and rhythm, normal S1/S2  Pulmonary: lungs clear to auscultation bilaterally  Gastrointestinal: no tenderness to palpation. No hepatosplenomegaly. Bowel sounds normoactive  Lymphatics: no cervical or supraclavicular lymphadenopathy "   Skin: warm and dry, no lesions or rashes.   Psychiatric: appropriate mood and affect. Good insight and appropriate judgment     Assessment and Plan   The following treatment plan was discussed     1. Diabetes mellitus without complication (CMS-HCC)  Advised her to have more consistent snacks, including protein snack at bedtime. Her fasting glucose still isn't under goal control, will increase her glipizide from 5 mg twice daily to 10 mg twice daily. Will have her follow up with me and the diabetes RN in 2 weeks.   - glipiZIDE (GLUCOTROL) 10 MG Tab; Take 1 Tab by mouth 2 times a day.  Dispense: 60 Tab; Refill: 1  - Diabetic Monofilament LE Exam    2. Neck swelling  She does have some slight tender swelling of the left side of her neck, will check ultrasound. Does have what appears to be a pimple in that area so unclear if related but symptoms have been ongoing for a month.   - US-SOFT TISSUES OF HEAD - NECK; Future    Healthcare maintenance     There are no preventive care reminders to display for this patient.    Return in about 2 weeks (around 5/8/2018).    Nehemias Giles MD  Internal Medicine  [facility]

## 2018-04-24 NOTE — ASSESSMENT & PLAN NOTE
When she last saw me 2/27/2018 she was started glipizide in addition to her metformin and asked her to record her blood sugars. Her fasting blood sugars are still 160-232. Says sometimes she feels anxious and starts sweating and her blood sugar was 90 during one of these episodes. She takes a piece of chocolate during these episodes and feels better. On the day she has had these episodes she hasn't missed a meal or eaten less. Says she only eats around 10 am and then in the evening at 5:30 - 6:00 pm. She doesn't eat snacks.

## 2018-04-24 NOTE — ASSESSMENT & PLAN NOTE
Every time she swallows and even when she doesn't she feels pain to the left side of her neck. It feels like something is there.

## 2018-05-03 ENCOUNTER — HOSPITAL ENCOUNTER (OUTPATIENT)
Dept: RADIOLOGY | Facility: MEDICAL CENTER | Age: 63
End: 2018-05-03
Attending: INTERNAL MEDICINE
Payer: COMMERCIAL

## 2018-05-03 DIAGNOSIS — R52 PAIN: ICD-10-CM

## 2018-05-03 PROCEDURE — 76536 US EXAM OF HEAD AND NECK: CPT

## 2018-06-08 ENCOUNTER — OFFICE VISIT (OUTPATIENT)
Dept: MEDICAL GROUP | Facility: PHYSICIAN GROUP | Age: 63
End: 2018-06-08
Payer: COMMERCIAL

## 2018-06-08 VITALS
DIASTOLIC BLOOD PRESSURE: 80 MMHG | HEIGHT: 65 IN | OXYGEN SATURATION: 96 % | WEIGHT: 159 LBS | HEART RATE: 82 BPM | SYSTOLIC BLOOD PRESSURE: 120 MMHG | TEMPERATURE: 97.9 F | BODY MASS INDEX: 26.49 KG/M2

## 2018-06-08 DIAGNOSIS — E11.9 DIABETES MELLITUS WITHOUT COMPLICATION (HCC): ICD-10-CM

## 2018-06-08 PROCEDURE — 99214 OFFICE O/P EST MOD 30 MIN: CPT | Performed by: INTERNAL MEDICINE

## 2018-06-08 NOTE — PROGRESS NOTES
PRIMARY CARE CLINIC FOLLOW UP VISIT  Chief Complaint   Patient presents with   • Diabetes       History of Present Illness     Elizabeth is here to follow up on her diabetes with me and the diabetes RN:     Diabetes mellitus without complication  She is here today to follow up of her diabetes with most recent A1c at 7.8% on metformin and glipizide. Her fasting blood sugars are usually around 150.     Current Outpatient Prescriptions   Medication Sig Dispense Refill   • Empagliflozin (JARDIANCE) 25 MG Tab Take 1 Tab by mouth every day. 30 Tab 11   • Empagliflozin (JARDIANCE) 25 MG Tab Take 1 Tab by mouth every day. 14 Tab 1   • glucose blood strip USE DAILY AND AS NEEDED FOR HIGH OR LOW SUGAR.  1   • glipiZIDE (GLUCOTROL) 10 MG Tab Take 1 Tab by mouth 2 times a day. 60 Tab 1   • amLODIPine (NORVASC) 5 MG Tab Take 1 Tab by mouth every day. 90 Tab 0   • losartan-hydrochlorothiazide (HYZAAR) 100-25 MG per tablet TAKE 1 TAB BY MOUTH ONCE DAILY 90 Tab 0   • Blood Glucose Monitoring Suppl Device Meter: Dispense Device of Insurance Preference. Sig. Use as directed for blood sugar monitoring. #1. NR. 1 Device 0   • Blood Glucose Monitoring Suppl Supplies Misc Test strips order: Test strips for insurance approved meter. Sig: use daily and prn ssx high or low sugar #100 RF x 3 100 Units 1   • Lancets Misc Lancets order: Lancets for insurance approved meter meter. Sig: use daily and prn ssx high or low sugar. #100 RF x 3 100 Each 1   • aspirin (ASPIRIN 81) 81 MG Chew Tab chewable tablet Take 1 Tab by mouth every day. 90 Tab 1   • fluticasone (FLONASE) 50 MCG/ACT nasal spray Spray 1 Spray in nose every day. 16 g 1   • metformin (GLUCOPHAGE) 1000 MG tablet Take 1 Tab by mouth 2 times a day, with meals. 180 Tab 1   • simvastatin (ZOCOR) 40 MG Tab TAKE 1 TABLET BY MOUTH IN THE EVENING 90 Tab 3   • omeprazole (PRILOSEC) 20 MG delayed-release capsule Take 1 Cap by mouth every day. 30 Cap 11   • Cholecalciferol (VITAMIN D PO) Take  by  mouth.     • naproxen (NAPROSYN) 500 MG Tab TAKE 1 TAB BY MOUTH 2 TIMES DAILY WITH FOOD  0   • PEG 3350-KCl-NaBcb-NaCl-NaSulf (PEG-3350/ELECTROLYTES) 236 g Recon Soln USE AS DIRECTED BY DOCTOR  0   • nystatin (MYCOSTATIN) 155989 UNIT/GM Cream topical cream Apply topically to affected area twice daily 8.5 g 1   • hydrocodone-acetaminophen (NORCO) 5-325 MG Tab per tablet Take 1-2 Tabs by mouth every four hours as needed. 20 Tab 0   • sumatriptan (IMITREX) 100 MG tablet Take 1 Tab by mouth Once PRN for Migraine for up to 1 dose. 10 Tab 3     No current facility-administered medications for this visit.      Past Medical History:   Diagnosis Date   • Allergic rhinitis    • Dalton's esophagus 4/14/2017   • Dental disorder     partial upper   • Diabetes mellitus without complication (HCC) 11/24/2015   • Dyslipidemia    • Essential hypertension 8/25/2015   • GERD (gastroesophageal reflux disease) 5/7/2009   • HTN    • Low back pain    • Migraine headache      Past Surgical History:   Procedure Laterality Date   • ROTATOR CUFF REPAIR Left 08/2017   • OREN BY LAPAROSCOPY Bilateral 8/3/2016    Procedure: OREN BY LAPAROSCOPY;  Surgeon: Scott Canales M.D.;  Location: SURGERY SAME DAY [facility];  Service:    • KNEE ARTHROSCOPY  2/13/2014    Performed by Wiley Kenyon M.D. at SURGERY [facility]   • MEDIAL MENISCECTOMY  2/13/2014    Performed by Wiley Kenyon M.D. at SURGERY [facility]   • SEPTOTURBINOPLASTY  3/30/2010    Performed by TRAV MENJIVAR at SURGERY SAME DAY [facility]   • SIGMOIDOSCOPY FLEX  3/10    normal   • COLONOSCOPY  3/09    normal   • ANKLE ORIF  5/07   • APPENDECTOMY  2003   • ABDOMINAL HYSTERECTOMY TOTAL  2002    partial due to fibroids     Social History   Substance Use Topics   • Smoking status: Former Smoker     Packs/day: 0.50     Years: 20.00     Types: Cigarettes     Quit date: 1/1/2006   • Smokeless tobacco: Never Used      Comment: 5 cig's a day for 20 yrs quit in 2006   • Alcohol use  "0.0 oz/week      Comment: 2 per month,  beer     Social History     Social History Narrative     at R     Family History   Problem Relation Age of Onset   • Hypertension Mother    • Cancer Mother 85     colon   • Hypertension Father    • Cancer Father      prostate     Family Status   Relation Status   • Mother  at age 96 y.o.    pneumonia   • Father Alive   • Sister Alive   • Sister Alive     Allergies: Patient has no known allergies.    ROS  As per HPI above. All other systems reviewed and negative.        Objective   Blood pressure 120/80, pulse 82, temperature 36.6 °C (97.9 °F), height 1.651 m (5' 5\"), weight 72.1 kg (159 lb), last menstrual period 2002, SpO2 96 %. Body mass index is 26.46 kg/m².    General: alert and oriented, pleasant, cooperative  HEENT: Normocephalic, atraumatic.   Cardiovascular: regular rate and rhythm, normal S1/S2  Pulmonary: lungs clear to auscultation bilaterally  Skin: warm and dry, no lesions or rashes  Psychiatric: appropriate mood and affect. Good insight and appropriate judgment     Assessment and Plan   The following treatment plan was discussed     1. Diabetes mellitus without complication (HCC)  Presents for follow up of diabetes mellitus. She indicates that she is feeling well and denies any symptoms referable to this diagnoses.   The patient is taking ASA every day and taking all other medications as prescribed. Patient denies any side effects of medication. Advised by diabetes RN to check sugars at other times of day to see if she spikes at particular times. Given sub-optimal control on metformin and glipizide will initiate Jardiance and follow up in 3 months.     A1c:   Lab Results   Component Value Date/Time    HBA1C 7.8 (H) 2018 08:36 AM      Glucose monitoring   Fasting sugars: 150s   Diabetic complications: none  ACR Albumin/Creatinine Ratio goal <30   HTN: Blood pressure goal <130/<80 . Currently Rx ACE/ARB: Yes  Hyperlipidemia: " Cholesterol goal LDL <100, total/HDL <5 . Currently Rx Statin: Yes  Last eye exam: requested records   Last monofilament foot exam: up to date    - Empagliflozin (JARDIANCE) 25 MG Tab; Take 1 Tab by mouth every day.  Dispense: 30 Tab; Refill: 11  - Empagliflozin (JARDIANCE) 25 MG Tab; Take 1 Tab by mouth every day.  Dispense: 14 Tab; Refill: 1  - Diabetic Monofilament LE Exam      Healthcare maintenance     Health Maintenance Due   Topic Date Due   • RETINAL SCREENING  04/26/2018       Return in about 3 months (around 9/8/2018) for diabetes RN follow up .    Nehemias Giles MD  Internal Medicine  [facility]

## 2018-06-08 NOTE — LETTER
[facility]  Nehemias Giles M.D.  [address]  [place] [address]  Fax: [phone]   Authorization for Release/Disclosure of   Protected Health Information   Name: JORGE L BLUNT : 1955 SSN: [id number]   Address: [address] [place] [address] Phone:    [phone] (home)    I authorize the entity listed below to release/disclose the PHI below to:   [facility]/Nehemias Giles M.D. and Nehemias Giles M.D.   Provider or Entity Name: Tamica Walker      Address   City, [facility], [facility]   Phone:      Fax:  [phone]   Reason for request: continuity of care   Information to be released:    [  ] LAST COLONOSCOPY,  including any PATH REPORT and follow-up  [  ] LAST FIT/COLOGUARD RESULT [  ] LAST DEXA  [  ] LAST MAMMOGRAM  [  ] LAST PAP  [  ] LAST LABS [ ** ] RETINA EXAM REPORT  [  ] IMMUNIZATION RECORDS  [  ] Release all info      [  ] Check here and initial the line next to each item to release ALL health information INCLUDING  _____ Care and treatment for drug and / or alcohol abuse  _____ HIV testing, infection status, or AIDS  _____ Genetic Testing    DATES OF SERVICE OR TIME PERIOD TO BE DISCLOSED: _____________  I understand and acknowledge that:  * This Authorization may be revoked at any time by you in writing, except if your health information has already been used or disclosed.  * Your health information that will be used or disclosed as a result of you signing this authorization could be re-disclosed by the recipient. If this occurs, your re-disclosed health information may no longer be protected by State or Federal laws.  * You may refuse to sign this Authorization. Your refusal will not affect your ability to obtain treatment.  * This Authorization becomes effective upon signing and will  on (date) __________.      If no date is indicated, this Authorization will  one (1) year from the signature date.    Name: Jorge L Blunt       Date:     2018       PLEASE FAX  REQUESTED RECORDS BACK TO: [phone]

## 2018-06-08 NOTE — ASSESSMENT & PLAN NOTE
She is here today to follow up of her diabetes with most recent A1c at 7.8% on metformin and glipizide. Her fasting blood sugars are usually around 150.

## 2018-06-08 NOTE — PROGRESS NOTES
RN-GAETANO Note    Subjective:     Health changes since last visit/interval Hx: General health good.  Having less migraine headaches.      Medications (including changes made today)  Current Outpatient Prescriptions   Medication Sig Dispense Refill   • glucose blood strip USE DAILY AND AS NEEDED FOR HIGH OR LOW SUGAR.  1   • glipiZIDE (GLUCOTROL) 10 MG Tab Take 1 Tab by mouth 2 times a day. 60 Tab 1   • amLODIPine (NORVASC) 5 MG Tab Take 1 Tab by mouth every day. 90 Tab 0   • losartan-hydrochlorothiazide (HYZAAR) 100-25 MG per tablet TAKE 1 TAB BY MOUTH ONCE DAILY 90 Tab 0   • Blood Glucose Monitoring Suppl Device Meter: Dispense Device of Insurance Preference. Sig. Use as directed for blood sugar monitoring. #1. NR. 1 Device 0   • Blood Glucose Monitoring Suppl Supplies Misc Test strips order: Test strips for insurance approved meter. Sig: use daily and prn ssx high or low sugar #100 RF x 3 100 Units 1   • Lancets Misc Lancets order: Lancets for insurance approved meter meter. Sig: use daily and prn ssx high or low sugar. #100 RF x 3 100 Each 1   • aspirin (ASPIRIN 81) 81 MG Chew Tab chewable tablet Take 1 Tab by mouth every day. 90 Tab 1   • fluticasone (FLONASE) 50 MCG/ACT nasal spray Spray 1 Spray in nose every day. 16 g 1   • metformin (GLUCOPHAGE) 1000 MG tablet Take 1 Tab by mouth 2 times a day, with meals. 180 Tab 1   • simvastatin (ZOCOR) 40 MG Tab TAKE 1 TABLET BY MOUTH IN THE EVENING 90 Tab 3   • omeprazole (PRILOSEC) 20 MG delayed-release capsule Take 1 Cap by mouth every day. 30 Cap 11   • Cholecalciferol (VITAMIN D PO) Take  by mouth.     • naproxen (NAPROSYN) 500 MG Tab TAKE 1 TAB BY MOUTH 2 TIMES DAILY WITH FOOD  0   • PEG 3350-KCl-NaBcb-NaCl-NaSulf (PEG-3350/ELECTROLYTES) 236 g Recon Soln USE AS DIRECTED BY DOCTOR  0   • nystatin (MYCOSTATIN) 113106 UNIT/GM Cream topical cream Apply topically to affected area twice daily 8.5 g 1   • hydrocodone-acetaminophen (NORCO) 5-325 MG Tab per tablet Take 1-2 Tabs  by mouth every four hours as needed. 20 Tab 0   • sumatriptan (IMITREX) 100 MG tablet Take 1 Tab by mouth Once PRN for Migraine for up to 1 dose. 10 Tab 3     No current facility-administered medications for this visit.          Taking daily ASA: Yes  Taking above medications as prescribed: Yes   Patient Denies side effects of medication.    Exercise: Walking at work  Diet: Breakfast is coffee and bread.  Lunch is soup and rice.  Dinner is meat, vegetables, and rice.  Coffee and lemon water.    Health Maintenance:   Health Maintenance Topics with due status: Overdue       Topic Date Due    RETINAL SCREENING 04/26/2018         DM:   Last A1c:   Lab Results   Component Value Date/Time    HBA1C 7.8 (H) 04/21/2018 08:36 AM      A1c goal: < 7    Glucose monitoring frequency: Testing blood fasting  fasting range: 100-200  Hypoglycemic episodes: no     Last Retinal Exam: States within the last year Provider: Aaron  Daily Foot Exam: yes  Routine Dental Exams: yes    Lab Results   Component Value Date/Time    MALBCRT 8 04/21/2018 08:35 AM    MICROALBUR 1.5 04/21/2018 08:35 AM        ACR Albumin/Creatinine Ratio goal <30     Diabetic complications: mild burning of feet at night    HTN:   Blood pressure goal <140/<80 .   Currently Rx ACE/ARB: Yes    Dyslipidemia:    Lab Results   Component Value Date/Time    CHOLSTRLTOT 170 04/21/2018 08:36 AM    LDL 89 04/21/2018 08:36 AM    HDL 62 04/21/2018 08:36 AM    TRIGLYCERIDE 93 04/21/2018 08:36 AM       Lab Results   Component Value Date/Time    SODIUM 138 04/21/2018 08:36 AM    POTASSIUM 4.1 04/21/2018 08:36 AM    CHLORIDE 103 04/21/2018 08:36 AM    CO2 25 04/21/2018 08:36 AM    GLUCOSE 216 (H) 04/21/2018 08:36 AM    BUN 18 04/21/2018 08:36 AM    CREATININE 0.57 04/21/2018 08:36 AM    CREATININE 0.68 02/09/2011 08:05 AM    BUNCREATRAT 19 02/09/2011 08:05 AM    GLOMRATE >59 02/09/2011 08:05 AM     Lab Results   Component Value Date/Time    ALKPHOSPHAT 82 04/21/2018 08:36 AM     ASTSGOT 22 04/21/2018 08:36 AM    ALTSGPT 26 04/21/2018 08:36 AM    TBILIRUBIN 0.5 04/21/2018 08:36 AM        Currently Rx Statin: Yes      She  reports that she quit smoking about 12 years ago. Her smoking use included Cigarettes. She has a 10.00 pack-year smoking history. She has never used smokeless tobacco.    Objective:     Exam:  Monofilament: done  Monofilament testing with a 10 gram force: sensation intact:   Visual Inspection: Feet without maceration, ulcers, fissures.  Pedal pulses: intact bilaterally      Plan:     - Diabetic diet discussed in detail-plate method.  - Home glucose monitoring.  - She will test and log.    - She will walk for 20-30 minutes daily.  - Reviewed medications and advised to take metformin after meals to decrease   G.I.upset.   - Discussed importance of immunizations and yearly eye exams.   - Encouraged patient to attend diabetes education program.   -Educational material distributed.   - Advised daily foot exams. Educated on signs of infection.       Recommended medication changes: She will start Jardiance 25 mg daily.  She well hydrated with water and watch for any signs of urinary tract or yeast infections.  She will test daily at a different time.  She will call with any questions.

## 2018-06-26 ENCOUNTER — TELEPHONE (OUTPATIENT)
Dept: MEDICAL GROUP | Facility: PHYSICIAN GROUP | Age: 63
End: 2018-06-26

## 2018-06-26 NOTE — TELEPHONE ENCOUNTER
1. Caller Name: [facility]                                         Call Back Number: [phone]      Patient approves a detailed voicemail message: N\A    Pharmacy received rx for empaglifloxin # 14 and note stating free trial coupon.  They state coupon would only cover a min of 15 days.

## 2018-06-26 NOTE — TELEPHONE ENCOUNTER
Yes, Nay had given her the coupon just for the 14 or 15 days and then we were going to see if insurance was going to cover it. Correct, Nay?

## 2018-07-03 DIAGNOSIS — E11.9 DIABETES MELLITUS WITHOUT COMPLICATION (HCC): ICD-10-CM

## 2018-07-03 RX ORDER — GLIPIZIDE 10 MG/1
10 TABLET ORAL 2 TIMES DAILY
Qty: 180 TAB | Refills: 0 | Status: SHIPPED | OUTPATIENT
Start: 2018-07-03 | End: 2018-12-04 | Stop reason: SDUPTHER

## 2018-07-05 NOTE — TELEPHONE ENCOUNTER
Was the patient seen in the last year in this department? Yes     Does patient have an active prescription for medications requested? No     Received Request Via: Pharmacy      Pt met protocol?: yes    OV 6/18    BP Readings from Last 1 Encounters:   06/08/18 120/80

## 2018-07-06 RX ORDER — AMLODIPINE BESYLATE 5 MG/1
5 TABLET ORAL
Qty: 90 TAB | Refills: 0 | Status: SHIPPED | OUTPATIENT
Start: 2018-07-06 | End: 2018-11-27 | Stop reason: SDUPTHER

## 2018-07-06 RX ORDER — LOSARTAN POTASSIUM AND HYDROCHLOROTHIAZIDE 25; 100 MG/1; MG/1
TABLET ORAL
Qty: 90 TAB | Refills: 0 | Status: SHIPPED | OUTPATIENT
Start: 2018-07-06 | End: 2018-11-26 | Stop reason: SDUPTHER

## 2018-08-07 ENCOUNTER — PATIENT OUTREACH (OUTPATIENT)
Dept: HEALTH INFORMATION MANAGEMENT | Facility: OTHER | Age: 63
End: 2018-08-07

## 2018-08-07 NOTE — PROGRESS NOTES
Outcome: Left Message    Please transfer to Patient Outreach Team at [phone] when patient returns call.    WebIZ Checked & Epic Updated:  yes  Hep A, adult   Influenza w/preserv.   Zoster Recombinant     HealthConnect Verified: no    Attempt # 1

## 2018-08-08 NOTE — PROGRESS NOTES
1. Attempt #:FINAL       4. Verify PCP: yes    5. Communication Preference Obtained: yes    6. Diabetes Visit Scheduling  Scheduling Status:Scheduled      7. Care Gap Scheduling (Attempt to Schedule EACH Overdue Care Gap!)    Health Maintenance Due   Topic Date Due   • IMM ZOSTER VACCINES (1 of 2) 05/26/2007 WANTS TO DISCUSS WITH PCP   • RETINAL SCREENING  04/26/2018 RECORDS REQUESTED         8. Patient was directed to Health and Wellness Website: no         9. Screened for [employer] Prescription? yes  10. MyChart Activation: declined

## 2018-08-21 ENCOUNTER — TELEPHONE (OUTPATIENT)
Dept: MEDICAL GROUP | Facility: PHYSICIAN GROUP | Age: 63
End: 2018-08-21

## 2018-08-21 NOTE — TELEPHONE ENCOUNTER
----- Message from Oumou Weiner sent at 8/21/2018  9:30 AM PDT -----  Regarding: Refill of BP meds  Eamon Brady    Pt needs a refill of her BP meds.  Pt is completely out of her meds. She does not know the name of the meds only that they are BP meds.  Pt is stating that the pharmacy has contacted us about 5 times and they haven't heard back.Pt uses [facility] [email or web address]  Please contact pt [phone]

## 2018-08-21 NOTE — TELEPHONE ENCOUNTER
Left vm that both her blood pressure medicaions; losartan-hydrochlorothiazide (HYZAAR) and amLODIPine (NORVASC) were refilled on 7/6/18 for a 90 day supply.

## 2018-08-28 ENCOUNTER — TELEPHONE (OUTPATIENT)
Dept: MEDICAL GROUP | Facility: PHYSICIAN GROUP | Age: 63
End: 2018-08-28

## 2018-08-28 DIAGNOSIS — E11.9 DIABETES MELLITUS WITHOUT COMPLICATION (HCC): ICD-10-CM

## 2018-08-28 NOTE — TELEPHONE ENCOUNTER
1. Caller Name: Elizabeth Blunt                                           Call Back Number: [phone] (home)         Patient approves a detailed voicemail message: N\A    Pt has appointment in Dec for DM follow up.  She r/s from 09/18.  She is asking if labs could be ordered now.

## 2018-09-20 DIAGNOSIS — E11.9 DIABETES MELLITUS WITHOUT COMPLICATION (HCC): ICD-10-CM

## 2018-09-21 NOTE — TELEPHONE ENCOUNTER
Was the patient seen in the last year in this department? Yes    Does patient have an active prescription for medications requested? No     Received Request Via: Pharmacy      Pt met protocol?: Yes    LAST OV 06/08/2018      Lab Results  Component Value Date/Time   HBA1C 7.8 (H) 04/21/2018 0836       Lab Results  Component Value Date/Time   AVGLUC 177 04/21/2018 0836       Lab Results  Component Value Date/Time   CHOLSTRLTOT 170 04/21/2018 0836       Lab Results  Component Value Date/Time   TRIGLYCERIDE 93 04/21/2018 0836       Lab Results  Component Value Date/Time   HDL 62 04/21/2018 0836       Lab Results  Component Value Date/Time   LDL 89 04/21/2018 0836

## 2018-11-05 ENCOUNTER — OFFICE VISIT (OUTPATIENT)
Dept: URGENT CARE | Facility: PHYSICIAN GROUP | Age: 63
End: 2018-11-05
Payer: COMMERCIAL

## 2018-11-05 VITALS
HEART RATE: 92 BPM | DIASTOLIC BLOOD PRESSURE: 88 MMHG | WEIGHT: 160 LBS | SYSTOLIC BLOOD PRESSURE: 134 MMHG | OXYGEN SATURATION: 94 % | BODY MASS INDEX: 26.63 KG/M2 | TEMPERATURE: 98 F | RESPIRATION RATE: 16 BRPM

## 2018-11-05 DIAGNOSIS — B02.9 HERPES ZOSTER WITHOUT COMPLICATION: ICD-10-CM

## 2018-11-05 PROCEDURE — 99214 OFFICE O/P EST MOD 30 MIN: CPT | Performed by: FAMILY MEDICINE

## 2018-11-05 RX ORDER — VALACYCLOVIR HYDROCHLORIDE 1 G/1
1000 TABLET, FILM COATED ORAL 3 TIMES DAILY
Qty: 21 TAB | Refills: 0 | Status: SHIPPED | OUTPATIENT
Start: 2018-11-05 | End: 2018-11-12

## 2018-11-05 RX ORDER — TRIAMCINOLONE ACETONIDE 1 MG/G
OINTMENT TOPICAL
Qty: 1 TUBE | Refills: 1 | Status: SHIPPED | OUTPATIENT
Start: 2018-11-05 | End: 2019-06-21

## 2018-11-05 ASSESSMENT — ENCOUNTER SYMPTOMS
EYE REDNESS: 0
FOCAL WEAKNESS: 0
FLANK PAIN: 0
MYALGIAS: 0
NECK PAIN: 0
WEIGHT LOSS: 0
SENSORY CHANGE: 0
EYE DISCHARGE: 0

## 2018-11-05 NOTE — PROGRESS NOTES
Subjective:      Elizabeth Blunt is a 63 y.o. female who presents with Herpes Zoster (possible shingles on right abdomen around to back)            3 days burning pain and vesicular rash right lower thoracic dermatome. No fever. Pain severity 7-8/10. No drainage. No eye involvement. +itching. No other aggravating or alleviating factors.      OTC benadryl without relief.     Review of Systems   Constitutional: Negative for malaise/fatigue and weight loss.   Eyes: Negative for discharge and redness.   Genitourinary: Negative for flank pain and hematuria.   Musculoskeletal: Negative for joint pain, myalgias and neck pain.   Neurological: Negative for sensory change and focal weakness.     .  Medications, Allergies, and current problem list reviewed today in Epic       Objective:     /88 (BP Location: Right arm, Patient Position: Sitting)   Pulse 92   Temp 36.7 °C (98 °F)   Resp 16   Wt 72.6 kg (160 lb)   LMP 12/05/2002   SpO2 94%   BMI 26.63 kg/m²      Physical Exam   Constitutional: She is oriented to person, place, and time. She appears well-developed and well-nourished. No distress.   HENT:   Head: Normocephalic and atraumatic.   Eyes: Conjunctivae are normal.   Cardiovascular: Normal rate, regular rhythm and normal heart sounds.    Pulmonary/Chest: Effort normal and breath sounds normal.   Neurological: She is alert and oriented to person, place, and time.   Skin: Skin is warm and dry.   Vesicular rash right T11 or T12 dermatome.  No evidence of secondary bacterial infection.  Does not cross midline.               Assessment/Plan:     1. Herpes zoster without complication  valacyclovir (VALTREX) 1 GM Tab    triamcinolone acetonide (KENALOG) 0.1 % Ointment     Differential diagnosis, natural history, supportive care, and indications for immediate follow-up discussed at length.

## 2018-11-26 DIAGNOSIS — E11.9 DIABETES MELLITUS WITHOUT COMPLICATION (HCC): ICD-10-CM

## 2018-11-29 RX ORDER — LOSARTAN POTASSIUM AND HYDROCHLOROTHIAZIDE 25; 100 MG/1; MG/1
TABLET ORAL
Qty: 90 TAB | Refills: 0 | Status: SHIPPED | OUTPATIENT
Start: 2018-11-29 | End: 2019-05-10 | Stop reason: SDUPTHER

## 2018-11-29 RX ORDER — ASPIRIN 81 MG
81 TABLET,CHEWABLE ORAL DAILY
Qty: 90 TAB | Refills: 0 | Status: SHIPPED | OUTPATIENT
Start: 2018-11-29 | End: 2019-03-15

## 2018-11-29 RX ORDER — AMLODIPINE BESYLATE 5 MG/1
TABLET ORAL
Qty: 90 TAB | Refills: 0 | Status: SHIPPED | OUTPATIENT
Start: 2018-11-29 | End: 2018-12-05

## 2018-11-29 NOTE — TELEPHONE ENCOUNTER
Was the patient seen in the last year in this department? Yes    Does patient have an active prescription for medications requested? No     Received Request Via: Pharmacy    Pt met protocol?: Yes     Last OV 06/2018  BP Readings from Last 1 Encounters:   11/05/18 134/88

## 2018-12-04 DIAGNOSIS — E11.9 DIABETES MELLITUS WITHOUT COMPLICATION (HCC): ICD-10-CM

## 2018-12-05 RX ORDER — GLIPIZIDE 10 MG/1
TABLET ORAL
Qty: 180 TAB | Refills: 0 | Status: SHIPPED | OUTPATIENT
Start: 2018-12-05 | End: 2019-09-27 | Stop reason: SDUPTHER

## 2018-12-05 RX ORDER — AMLODIPINE BESYLATE 5 MG/1
TABLET ORAL
Qty: 90 TAB | Refills: 0 | Status: SHIPPED | OUTPATIENT
Start: 2018-12-05 | End: 2019-08-14 | Stop reason: SDUPTHER

## 2018-12-05 NOTE — TELEPHONE ENCOUNTER
Was the patient seen in the last year in this department? Yes    Does patient have an active prescription for medications requested? No     Received Request Via: Pharmacy      Pt met protocol?: Yes  pt last ov 6/2018   BP Readings from Last 1 Encounters:   11/05/18 134/88       Lab Results  Component Value Date/Time   CHOLSTRLTOT 170 04/21/2018 0836   TRIGLYCERIDE 93 04/21/2018 0836   HDL 62 04/21/2018 0836   LDL 89 04/21/2018 0836       Lab Results   Component Value Date/Time    HBA1C 7.8 (H) 04/21/2018 08:36 AM

## 2018-12-07 ENCOUNTER — HOSPITAL ENCOUNTER (OUTPATIENT)
Dept: LAB | Facility: MEDICAL CENTER | Age: 63
End: 2018-12-07
Attending: INTERNAL MEDICINE
Payer: COMMERCIAL

## 2018-12-07 DIAGNOSIS — E11.9 DIABETES MELLITUS WITHOUT COMPLICATION (HCC): ICD-10-CM

## 2018-12-07 PROCEDURE — 36415 COLL VENOUS BLD VENIPUNCTURE: CPT

## 2018-12-07 PROCEDURE — 83036 HEMOGLOBIN GLYCOSYLATED A1C: CPT

## 2018-12-10 ENCOUNTER — OFFICE VISIT (OUTPATIENT)
Dept: MEDICAL GROUP | Facility: PHYSICIAN GROUP | Age: 63
End: 2018-12-10
Payer: COMMERCIAL

## 2018-12-10 ENCOUNTER — TELEPHONE (OUTPATIENT)
Dept: MEDICAL GROUP | Facility: PHYSICIAN GROUP | Age: 63
End: 2018-12-10

## 2018-12-10 VITALS
HEART RATE: 99 BPM | SYSTOLIC BLOOD PRESSURE: 140 MMHG | BODY MASS INDEX: 26.99 KG/M2 | DIASTOLIC BLOOD PRESSURE: 82 MMHG | WEIGHT: 162 LBS | TEMPERATURE: 97.5 F | HEIGHT: 65 IN | OXYGEN SATURATION: 99 %

## 2018-12-10 DIAGNOSIS — E11.9 DIABETES MELLITUS WITHOUT COMPLICATION (HCC): ICD-10-CM

## 2018-12-10 PROCEDURE — 99213 OFFICE O/P EST LOW 20 MIN: CPT | Performed by: INTERNAL MEDICINE

## 2018-12-10 NOTE — PROGRESS NOTES
RN-CDE Note    Subjective:     Health changes since last visit/interval Hx: None, has had a case of shingles to R abdomen/back, mostly resolved except minor itching. Vesicular areas now dried and healing. Took Jardiance for approximately 2.5 months, stopped because of a change in insurance and increased cost, otherwise tolerated well.        Medications (including changes made today)  Current Outpatient Prescriptions   Medication Sig Dispense Refill   • glipiZIDE (GLUCOTROL) 10 MG Tab TAKE 1 TABLET BY MOUTH TWICE A  Tab 0   • amLODIPine (NORVASC) 5 MG Tab TAKE 1 TABLET BY MOUTH EVERY DAY 90 Tab 0   • losartan-hydrochlorothiazide (HYZAAR) 100-25 MG per tablet TAKE 1 TABLET BY MOUTH EVERY DAY 90 Tab 0   • TATYANA LOW DOSE 81 MG Chew Tab chewable tablet TAKE 1 TAB BY MOUTH EVERY DAY. 90 Tab 0   • metformin (GLUCOPHAGE) 1000 MG tablet TAKE 1 TAB BY MOUTH 2 TIMES A DAY, WITH MEALS. 180 Tab 0   • nystatin (MYCOSTATIN) 531310 UNIT/GM Cream topical cream Apply topically to affected area twice daily 8.5 g 1   • triamcinolone acetonide (KENALOG) 0.1 % Ointment Apply thin layer to affected area twice daily as needed 1 Tube 1   • Empagliflozin (JARDIANCE) 25 MG Tab Take 1 Tab by mouth every day. (Patient not taking: Reported on 12/10/2018) 30 Tab 11   • Empagliflozin (JARDIANCE) 25 MG Tab Take 1 Tab by mouth every day. (Patient not taking: Reported on 12/10/2018) 14 Tab 1   • naproxen (NAPROSYN) 500 MG Tab TAKE 1 TAB BY MOUTH 2 TIMES DAILY WITH FOOD  0   • glucose blood strip USE DAILY AND AS NEEDED FOR HIGH OR LOW SUGAR.  1   • Blood Glucose Monitoring Suppl Device Meter: Dispense Device of Insurance Preference. Sig. Use as directed for blood sugar monitoring. #1. NR. 1 Device 0   • Blood Glucose Monitoring Suppl Supplies Misc Test strips order: Test strips for insurance approved meter. Sig: use daily and prn ssx high or low sugar #100 RF x 3 100 Units 1   • Lancets Misc Lancets order: Lancets for insurance approved meter  meter. Sig: use daily and prn ssx high or low sugar. #100 RF x 3 100 Each 1   • fluticasone (FLONASE) 50 MCG/ACT nasal spray Spray 1 Spray in nose every day. 16 g 1   • simvastatin (ZOCOR) 40 MG Tab TAKE 1 TABLET BY MOUTH IN THE EVENING 90 Tab 3   • hydrocodone-acetaminophen (NORCO) 5-325 MG Tab per tablet Take 1-2 Tabs by mouth every four hours as needed. 20 Tab 0   • omeprazole (PRILOSEC) 20 MG delayed-release capsule Take 1 Cap by mouth every day. 30 Cap 11   • Cholecalciferol (VITAMIN D PO) Take  by mouth.     • sumatriptan (IMITREX) 100 MG tablet Take 1 Tab by mouth Once PRN for Migraine for up to 1 dose. 10 Tab 3     No current facility-administered medications for this visit.        Taking daily ASA: Yes  Taking above medications as prescribed: yes  SIDE EFFECTS: Patient reports the following side effects: Stopped Jardiance 2/2 costs after health insurance changed.     Exercise: Recently retired from job as , was physically active, more sedentary now  Diet: eats only two meals a day, eats vegetables  Patient's body mass index is 26.96 kg/m². Exercise and nutrition counseling were performed at this visit.      Health Maintenance:   Health Maintenance Due   Topic Date Due   • IMM ZOSTER VACCINES (1 of 2) 05/26/2007   • RETINAL SCREENING  04/26/2018       Immunizations:   PPSV23: Up-to-date  [id number]: Up-to-date  Tdap: Up-to-date  Flu: Up-to-date  Hep B: Up-to-date    DM:   Last A1c:   Lab Results   Component Value Date/Time    HBA1C 7.8 (H) 04/21/2018 08:36 AM      A1C GOAL: < 7    Glucose monitoring frequency: intermittent  fasting bgs   Hypoglycemic episodes: no    Last Retinal Exam:   Daily Foot Exam: Yes   Routine Dental Exams: Yes    Lab Results   Component Value Date/Time    MALBCRT 8 04/21/2018 08:35 AM    MICROALBUR 1.5 04/21/2018 08:35 AM        ACR Albumin/Creatinine Ratio goal <30     HTN:   Blood pressure goal <140/<80.   Currently Rx ACE/ARB: Yes    Dyslipidemia:    Lab Results    Component Value Date/Time    CHOLSTRLTOT 170 04/21/2018 08:36 AM    LDL 89 04/21/2018 08:36 AM    HDL 62 04/21/2018 08:36 AM    TRIGLYCERIDE 93 04/21/2018 08:36 AM       Lab Results   Component Value Date/Time    SODIUM 138 04/21/2018 08:36 AM    POTASSIUM 4.1 04/21/2018 08:36 AM    CHLORIDE 103 04/21/2018 08:36 AM    CO2 25 04/21/2018 08:36 AM    GLUCOSE 216 (H) 04/21/2018 08:36 AM    BUN 18 04/21/2018 08:36 AM    CREATININE 0.57 04/21/2018 08:36 AM    CREATININE 0.68 02/09/2011 08:05 AM    BUNCREATRAT 19 02/09/2011 08:05 AM    GLOMRATE >59 02/09/2011 08:05 AM     Lab Results   Component Value Date/Time    ALKPHOSPHAT 82 04/21/2018 08:36 AM    ASTSGOT 22 04/21/2018 08:36 AM    ALTSGPT 26 04/21/2018 08:36 AM    TBILIRUBIN 0.5 04/21/2018 08:36 AM        Currently Rx Statin: Yes    She  reports that she quit smoking about 12 years ago. Her smoking use included Cigarettes. She has a 10.00 pack-year smoking history. She has never used smokeless tobacco.    Objective:     Exam:  Monofilament: not done    Plan:     Discussed and educated on:   - All medications, side effects and compliance (discussed carefully)  - Factors Affecting Blood Glucose Control: food, illness, medication and stress  - Home glucose monitoring emphasized  - Weight control and daily exercise      Recommended medication changes: None at this time. She is working on improving diet.  Is willing to explore additional medications after we get the result of her most recent A1c if it is not closer to 7 (lab had a delay in processing, result not available at this visit.).

## 2018-12-10 NOTE — PROGRESS NOTES
PRIMARY CARE CLINIC FOLLOW UP VISIT  Chief Complaint   Patient presents with   • Diabetes     History of Present Illness     Diabetes mellitus without complication  Had A1c pending from 12/7/2018 which hasn't resulted yet. She is taking metformin 1 g bid, glipizide 10 mg bid. Stopped jardiance 25 mg about a month ago since she changed insurance and it was cost prohibitive. Her fasting sugars are .     Current Outpatient Prescriptions   Medication Sig Dispense Refill   • glipiZIDE (GLUCOTROL) 10 MG Tab TAKE 1 TABLET BY MOUTH TWICE A  Tab 0   • amLODIPine (NORVASC) 5 MG Tab TAKE 1 TABLET BY MOUTH EVERY DAY 90 Tab 0   • losartan-hydrochlorothiazide (HYZAAR) 100-25 MG per tablet TAKE 1 TABLET BY MOUTH EVERY DAY 90 Tab 0   • TATYANA LOW DOSE 81 MG Chew Tab chewable tablet TAKE 1 TAB BY MOUTH EVERY DAY. 90 Tab 0   • metformin (GLUCOPHAGE) 1000 MG tablet TAKE 1 TAB BY MOUTH 2 TIMES A DAY, WITH MEALS. 180 Tab 0   • nystatin (MYCOSTATIN) 023311 UNIT/GM Cream topical cream Apply topically to affected area twice daily 8.5 g 1   • triamcinolone acetonide (KENALOG) 0.1 % Ointment Apply thin layer to affected area twice daily as needed 1 Tube 1   • naproxen (NAPROSYN) 500 MG Tab TAKE 1 TAB BY MOUTH 2 TIMES DAILY WITH FOOD  0   • glucose blood strip USE DAILY AND AS NEEDED FOR HIGH OR LOW SUGAR.  1   • Blood Glucose Monitoring Suppl Device Meter: Dispense Device of Insurance Preference. Sig. Use as directed for blood sugar monitoring. #1. NR. 1 Device 0   • Blood Glucose Monitoring Suppl Supplies Misc Test strips order: Test strips for insurance approved meter. Sig: use daily and prn ssx high or low sugar #100 RF x 3 100 Units 1   • Lancets Misc Lancets order: Lancets for insurance approved meter meter. Sig: use daily and prn ssx high or low sugar. #100 RF x 3 100 Each 1   • fluticasone (FLONASE) 50 MCG/ACT nasal spray Spray 1 Spray in nose every day. 16 g 1   • simvastatin (ZOCOR) 40 MG Tab TAKE 1 TABLET BY MOUTH IN  THE EVENING 90 Tab 3   • hydrocodone-acetaminophen (NORCO) 5-325 MG Tab per tablet Take 1-2 Tabs by mouth every four hours as needed. 20 Tab 0   • omeprazole (PRILOSEC) 20 MG delayed-release capsule Take 1 Cap by mouth every day. 30 Cap 11   • Cholecalciferol (VITAMIN D PO) Take  by mouth.     • sumatriptan (IMITREX) 100 MG tablet Take 1 Tab by mouth Once PRN for Migraine for up to 1 dose. 10 Tab 3     No current facility-administered medications for this visit.      Past Medical History:   Diagnosis Date   • Allergic rhinitis    • Dalton's esophagus 4/14/2017   • Dental disorder     partial upper   • Diabetes mellitus without complication (HCC) 11/24/2015   • Dyslipidemia    • Essential hypertension 8/25/2015   • GERD (gastroesophageal reflux disease) 5/7/2009   • HTN    • Low back pain    • Migraine headache      Past Surgical History:   Procedure Laterality Date   • ROTATOR CUFF REPAIR Left 08/2017   • OREN BY LAPAROSCOPY Bilateral 8/3/2016    Procedure: OREN BY LAPAROSCOPY;  Surgeon: Scott Canales M.D.;  Location: SURGERY SAME DAY [facility] ORS;  Service:    • KNEE ARTHROSCOPY  2/13/2014    Performed by Wiley Kenyon M.D. at SURGERY [facility] ORS   • MEDIAL MENISCECTOMY  2/13/2014    Performed by Wiley Kenyon M.D. at SURGERY [facility] ORS   • SEPTOTURBINOPLASTY  3/30/2010    Performed by TRAV MENJIVAR at SURGERY SAME DAY [facility] ORS   • SIGMOIDOSCOPY FLEX  3/10    normal   • COLONOSCOPY  3/09    normal   • ANKLE ORIF  5/07   • APPENDECTOMY  2003   • ABDOMINAL HYSTERECTOMY TOTAL  2002    partial due to fibroids     Social History   Substance Use Topics   • Smoking status: Former Smoker     Packs/day: 0.50     Years: 20.00     Types: Cigarettes     Quit date: 1/1/2006   • Smokeless tobacco: Never Used      Comment: 5 cig's a day for 20 yrs quit in 2006   • Alcohol use 0.0 oz/week      Comment: 2 per month,  beer     Social History     Social History Narrative     at [employer]     Family History   Problem  "Relation Age of Onset   • Hypertension Mother    • Cancer Mother 85        colon   • Hypertension Father    • Cancer Father         prostate     Family Status   Relation Status   • Mo  at age 96 y.o.        pneumonia   • Fa Alive   • Sis Alive   • Sis Alive     Allergies: Patient has no known allergies.    ROS  As per HPI above. All other systems reviewed and negative.        Objective   Blood pressure 140/82, pulse 99, temperature 36.4 °C (97.5 °F), height 1.651 m (5' 5\"), weight 73.5 kg (162 lb), last menstrual period 2002, SpO2 99 %, not currently breastfeeding. Body mass index is 26.96 kg/m².    General: alert and oriented, pleasant, cooperative  HEENT: Normocephalic, atraumatic.  Psychiatric: appropriate mood and affect. Good insight and appropriate judgment       Assessment and Plan   The following treatment plan was discussed     1. Diabetes mellitus without complication (HCC)  A1c from 2018 still pending. Diabetes RN is looking to resuming her jardiance under prior auth and/or considering GLP-1 and will follow up with Elizabeth via phone.       Healthcare maintenance     Health Maintenance Due   Topic Date Due   • RETINAL SCREENING  2018       No Follow-up on file.    Nehemias Giles MD  Internal Medicine  [facility]                   "

## 2018-12-10 NOTE — ASSESSMENT & PLAN NOTE
Had A1c pending from 12/7/2018 which hasn't resulted yet. She is taking metformin 1 g bid, glipizide 10 mg bid. Stopped jardiance 25 mg about a month ago since she changed insurance and it was cost prohibitive. Her fasting sugars are .

## 2018-12-11 LAB
EST. AVERAGE GLUCOSE BLD GHB EST-MCNC: 203 MG/DL
HBA1C MFR BLD: 8.7 % (ref 0–5.6)

## 2019-01-09 DIAGNOSIS — E78.5 DYSLIPIDEMIA: Chronic | ICD-10-CM

## 2019-01-10 RX ORDER — SIMVASTATIN 40 MG
TABLET ORAL
Qty: 90 TAB | Refills: 0 | Status: SHIPPED | OUTPATIENT
Start: 2019-01-10 | End: 2019-06-13 | Stop reason: SDUPTHER

## 2019-01-10 NOTE — TELEPHONE ENCOUNTER
Was the patient seen in the last year in this department? Yes    Does patient have an active prescription for medications requested? No     Received Request Via: Pharmacy      Pt met protocol?: Yes, OV 12/18   Lab Results   Component Value Date/Time    CHOLSTRLTOT 170 04/21/2018 08:36 AM    LDL 89 04/21/2018 08:36 AM    HDL 62 04/21/2018 08:36 AM    TRIGLYCERIDE 93 04/21/2018 08:36 AM       Lab Results   Component Value Date/Time    SODIUM 138 04/21/2018 08:36 AM    POTASSIUM 4.1 04/21/2018 08:36 AM    CHLORIDE 103 04/21/2018 08:36 AM    CO2 25 04/21/2018 08:36 AM    GLUCOSE 216 (H) 04/21/2018 08:36 AM    BUN 18 04/21/2018 08:36 AM    CREATININE 0.57 04/21/2018 08:36 AM    CREATININE 0.68 02/09/2011 08:05 AM    BUNCREATRAT 19 02/09/2011 08:05 AM    GLOMRATE >59 02/09/2011 08:05 AM     Lab Results   Component Value Date/Time    ALKPHOSPHAT 82 04/21/2018 08:36 AM    ASTSGOT 22 04/21/2018 08:36 AM    ALTSGPT 26 04/21/2018 08:36 AM    TBILIRUBIN 0.5 04/21/2018 08:36 AM

## 2019-01-23 ENCOUNTER — HOSPITAL ENCOUNTER (OUTPATIENT)
Dept: RADIOLOGY | Facility: MEDICAL CENTER | Age: 64
End: 2019-01-23
Attending: INTERNAL MEDICINE
Payer: COMMERCIAL

## 2019-01-23 DIAGNOSIS — Z12.39 SCREENING BREAST EXAMINATION: ICD-10-CM

## 2019-01-23 PROCEDURE — 77063 BREAST TOMOSYNTHESIS BI: CPT

## 2019-03-15 ENCOUNTER — OFFICE VISIT (OUTPATIENT)
Dept: MEDICAL GROUP | Facility: PHYSICIAN GROUP | Age: 64
End: 2019-03-15
Payer: COMMERCIAL

## 2019-03-15 VITALS
DIASTOLIC BLOOD PRESSURE: 80 MMHG | OXYGEN SATURATION: 96 % | WEIGHT: 153 LBS | HEIGHT: 65 IN | BODY MASS INDEX: 25.49 KG/M2 | SYSTOLIC BLOOD PRESSURE: 120 MMHG | HEART RATE: 91 BPM

## 2019-03-15 DIAGNOSIS — E11.9 DIABETES MELLITUS WITHOUT COMPLICATION (HCC): ICD-10-CM

## 2019-03-15 LAB
HBA1C MFR BLD: 10.5 % (ref ?–5.8)
INT CON NEG: NORMAL
INT CON POS: NORMAL

## 2019-03-15 PROCEDURE — 83036 HEMOGLOBIN GLYCOSYLATED A1C: CPT | Performed by: INTERNAL MEDICINE

## 2019-03-15 PROCEDURE — 99214 OFFICE O/P EST MOD 30 MIN: CPT | Performed by: INTERNAL MEDICINE

## 2019-03-15 NOTE — ASSESSMENT & PLAN NOTE
Elizabeth is here for follow up with DM RN and me, her A1c today was 10.4% on metformin 1 g bid and glipizide 10 mg bid. Jardiance wasn't on her formulary. She does have a small piece of pastry in the morning with her coffee.

## 2019-03-15 NOTE — PROGRESS NOTES
RN-CDE Note    Subjective:     Health changes since last visit/interval Hx: She is losing weight with better diet.    Medications (including changes made today)  Current Outpatient Prescriptions   Medication Sig Dispense Refill   • simvastatin (ZOCOR) 40 MG Tab TAKE 1 TABLET BY MOUTH IN THE EVENING 90 Tab 0   • glipiZIDE (GLUCOTROL) 10 MG Tab TAKE 1 TABLET BY MOUTH TWICE A  Tab 0   • amLODIPine (NORVASC) 5 MG Tab TAKE 1 TABLET BY MOUTH EVERY DAY 90 Tab 0   • losartan-hydrochlorothiazide (HYZAAR) 100-25 MG per tablet TAKE 1 TABLET BY MOUTH EVERY DAY 90 Tab 0   • metformin (GLUCOPHAGE) 1000 MG tablet TAKE 1 TAB BY MOUTH 2 TIMES A DAY, WITH MEALS. 180 Tab 0   • glucose blood strip USE DAILY AND AS NEEDED FOR HIGH OR LOW SUGAR.  1   • Blood Glucose Monitoring Suppl Supplies Misc Test strips order: Test strips for insurance approved meter. Sig: use daily and prn ssx high or low sugar #100 RF x 3 100 Units 1   • Cholecalciferol (VITAMIN D PO) Take  by mouth.     • Empagliflozin 25 MG Tab Take 1 tablet by mouth every day for 360 days. (Patient not taking: Reported on 3/15/2019) 90 Tab 3   • triamcinolone acetonide (KENALOG) 0.1 % Ointment Apply thin layer to affected area twice daily as needed 1 Tube 1   • naproxen (NAPROSYN) 500 MG Tab TAKE 1 TAB BY MOUTH 2 TIMES DAILY WITH FOOD  0   • Blood Glucose Monitoring Suppl Device Meter: Dispense Device of Insurance Preference. Sig. Use as directed for blood sugar monitoring. #1. NR. 1 Device 0   • Lancets Misc Lancets order: Lancets for insurance approved meter meter. Sig: use daily and prn ssx high or low sugar. #100 RF x 3 (Patient not taking: Reported on 3/15/2019) 100 Each 1   • fluticasone (FLONASE) 50 MCG/ACT nasal spray Spray 1 Spray in nose every day. 16 g 1   • nystatin (MYCOSTATIN) [id number] UNIT/GM Cream topical cream Apply topically to affected area twice daily 8.5 g 1   • omeprazole (PRILOSEC) 20 MG delayed-release capsule Take 1 Cap by mouth every day. 30 Cap  11   • sumatriptan (IMITREX) 100 MG tablet Take 1 Tab by mouth Once PRN for Migraine for up to 1 dose. 10 Tab 3     No current facility-administered medications for this visit.        Taking daily ASA: No  Taking above medications as prescribed: no Unable to get Jardiance with new insurance.  SIDE EFFECTS: Patient denies side effects to medications    Exercise: She plans to start walking  Diet: Breakfast is at noon and protein, vegetables, and starch.  Dinner is left overs with protein and vegetables.  Patient's body mass index is 25.46 kg/m². Exercise and nutrition counseling were performed at this visit.      Health Maintenance:   Health Maintenance Due   Topic Date Due   • IMM ZOSTER VACCINES (1 of 2) 08/18/2015   • IMM INFLUENZA (1) 09/01/2018       Immunizations:   PPSV23: Up-to-date  Ghvjvns24: Up-to-date  Tdap: Up-to-date  Flu: Up-to-date  Hep B: unknown    DM:   Last A1c:   Lab Results   Component Value Date/Time    HBA1C 10.5 03/15/2019 10:31 AM      A1C GOAL: < 7    Glucose monitoring frequency: Not testing    Hypoglycemic episodes: no  Last Retinal Exam: on file and up-to-date  Daily Foot Exam: Yes   Routine Dental Exams: Yes    Lab Results   Component Value Date/Time    MALBCRT 8 04/21/2018 08:35 AM    MICROALBUR 1.5 04/21/2018 08:35 AM        ACR Albumin/Creatinine Ratio goal <30     HTN:   Blood pressure goal <140/<80 .   Currently Rx ACE/ARB: Yes    Dyslipidemia:    Lab Results   Component Value Date/Time    CHOLSTRLTOT 170 04/21/2018 08:36 AM    LDL 89 04/21/2018 08:36 AM    HDL 62 04/21/2018 08:36 AM    TRIGLYCERIDE 93 04/21/2018 08:36 AM       Lab Results   Component Value Date/Time    SODIUM 138 04/21/2018 08:36 AM    POTASSIUM 4.1 04/21/2018 08:36 AM    CHLORIDE 103 04/21/2018 08:36 AM    CO2 25 04/21/2018 08:36 AM    GLUCOSE 216 (H) 04/21/2018 08:36 AM    BUN 18 04/21/2018 08:36 AM    CREATININE 0.57 04/21/2018 08:36 AM    CREATININE 0.68 02/09/2011 08:05 AM    BUNCREATRAT 19 02/09/2011 08:05 AM     GLOMRATE >59 02/09/2011 08:05 AM     Lab Results   Component Value Date/Time    ALKPHOSPHAT 82 04/21/2018 08:36 AM    ASTSGOT 22 04/21/2018 08:36 AM    ALTSGPT 26 04/21/2018 08:36 AM    TBILIRUBIN 0.5 04/21/2018 08:36 AM        Currently Rx Statin: No    She  reports that she quit smoking about 13 years ago. Her smoking use included Cigarettes. She has a 10.00 pack-year smoking history. She has never used smokeless tobacco.    Objective:     Exam:  Monofilament: done   Monofilament testing with a 10 gram force: sensation intact: intact bilaterally  Visual Inspection: Feet without maceration, ulcers, fissures.  Pedal pulses: intact bilaterally    Plan:     Discussed and educated on:   - All medications, side effects and compliance (discussed carefully)  - Annual eye examinations at Ophthalmology  - Home glucose monitoring emphasized  - Weight control and daily exercise    Recommended medication changes: She will start Trulicity .75 mg weekly.  She will continue with her Glipizide and Metformin.

## 2019-03-15 NOTE — PROGRESS NOTES
PRIMARY CARE CLINIC FOLLOW UP VISIT  Chief Complaint   Patient presents with   • Diabetes     History of Present Illness     Diabetes mellitus without complication  Elizabeth is here for follow up with DM RN and me, her A1c today was 10.4% on metformin 1 g bid and glipizide 10 mg bid. Jardiance wasn't on her formulary. She does have a small piece of pastry in the morning with her coffee.     Current Outpatient Prescriptions   Medication Sig Dispense Refill   • Dulaglutide (TRULICITY) 0.75 MG/0.5ML Solution Pen-injector Inject 0.75 mg as instructed every 7 days. 4 PEN 6   • simvastatin (ZOCOR) 40 MG Tab TAKE 1 TABLET BY MOUTH IN THE EVENING 90 Tab 0   • glipiZIDE (GLUCOTROL) 10 MG Tab TAKE 1 TABLET BY MOUTH TWICE A  Tab 0   • amLODIPine (NORVASC) 5 MG Tab TAKE 1 TABLET BY MOUTH EVERY DAY 90 Tab 0   • losartan-hydrochlorothiazide (HYZAAR) 100-25 MG per tablet TAKE 1 TABLET BY MOUTH EVERY DAY 90 Tab 0   • metformin (GLUCOPHAGE) 1000 MG tablet TAKE 1 TAB BY MOUTH 2 TIMES A DAY, WITH MEALS. 180 Tab 0   • glucose blood strip USE DAILY AND AS NEEDED FOR HIGH OR LOW SUGAR.  1   • Blood Glucose Monitoring Suppl Supplies Misc Test strips order: Test strips for insurance approved meter. Sig: use daily and prn ssx high or low sugar #100 RF x 3 100 Units 1   • Cholecalciferol (VITAMIN D PO) Take  by mouth.     • triamcinolone acetonide (KENALOG) 0.1 % Ointment Apply thin layer to affected area twice daily as needed 1 Tube 1   • naproxen (NAPROSYN) 500 MG Tab TAKE 1 TAB BY MOUTH 2 TIMES DAILY WITH FOOD  0   • Blood Glucose Monitoring Suppl Device Meter: Dispense Device of Insurance Preference. Sig. Use as directed for blood sugar monitoring. #1. NR. 1 Device 0   • Lancets Misc Lancets order: Lancets for insurance approved meter meter. Sig: use daily and prn ssx high or low sugar. #100 RF x 3 (Patient not taking: Reported on 3/15/2019) 100 Each 1   • fluticasone (FLONASE) 50 MCG/ACT nasal spray Spray 1 Spray in nose every day.  16 g 1   • nystatin (MYCOSTATIN) 599556 UNIT/GM Cream topical cream Apply topically to affected area twice daily 8.5 g 1   • omeprazole (PRILOSEC) 20 MG delayed-release capsule Take 1 Cap by mouth every day. 30 Cap 11   • sumatriptan (IMITREX) 100 MG tablet Take 1 Tab by mouth Once PRN for Migraine for up to 1 dose. 10 Tab 3     No current facility-administered medications for this visit.      Past Medical History:   Diagnosis Date   • Allergic rhinitis    • Dalton's esophagus 4/14/2017   • Dental disorder     partial upper   • Diabetes mellitus without complication (HCC) 11/24/2015   • Dyslipidemia    • Essential hypertension 8/25/2015   • GERD (gastroesophageal reflux disease) 5/7/2009   • HTN    • Low back pain    • Migraine headache      Past Surgical History:   Procedure Laterality Date   • ROTATOR CUFF REPAIR Left 08/2017   • OREN BY LAPAROSCOPY Bilateral 8/3/2016    Procedure: OREN BY LAPAROSCOPY;  Surgeon: Scott Canales M.D.;  Location: SURGERY SAME DAY [facility] ORS;  Service:    • KNEE ARTHROSCOPY  2/13/2014    Performed by Wiley Kenyon M.D. at SURGERY [facility] ORS   • MEDIAL MENISCECTOMY  2/13/2014    Performed by Wiley Kenyon M.D. at SURGERY [facility] ORS   • SEPTOTURBINOPLASTY  3/30/2010    Performed by TRAV MENJIVAR at SURGERY SAME DAY [facility] ORS   • SIGMOIDOSCOPY FLEX  3/10    normal   • COLONOSCOPY  3/09    normal   • ANKLE ORIF  5/07   • APPENDECTOMY  2003   • ABDOMINAL HYSTERECTOMY TOTAL  2002    partial due to fibroids     Social History   Substance Use Topics   • Smoking status: Former Smoker     Packs/day: 0.50     Years: 20.00     Types: Cigarettes     Quit date: 1/1/2006   • Smokeless tobacco: Never Used      Comment: 5 cig's a day for 20 yrs quit in 2006   • Alcohol use 0.0 oz/week      Comment: 2 per month,  beer     Social History     Social History Narrative     at [employer] [facility]     Family History   Problem Relation Age of Onset   • Hypertension Mother    • Cancer Mother 85         "colon   • Hypertension Father    • Cancer Father         prostate     Family Status   Relation Status   • Mo  at age 96 y.o.        pneumonia   • Fa Alive   • Sis Alive   • Sis Alive     Allergies: Patient has no known allergies.    ROS  As per HPI above. All other systems reviewed and negative.        Objective   Blood pressure 120/80, pulse 91, height 1.651 m (5' 5\"), weight 69.4 kg (153 lb), last menstrual period 2002, SpO2 96 %, not currently breastfeeding. Body mass index is 25.46 kg/m².    General: alert and oriented, pleasant, cooperative  HEENT: Normocephalic, atraumatic.   Psychiatric: appropriate mood and affect. Good insight and appropriate judgment     Assessment and Plan   The following treatment plan was discussed     1. Diabetes mellitus without complication (HCC)  Betsy diabetes is uncontrolled and A1c is up to 10.4%. Will continue metformin 1 g bid, glipizide 10 mg bid and add weekly trulicity. We will follow up with her in 2-3 months.   - Diabetic Monofilament LE Exam  - POCT  A1C  - Dulaglutide (TRULICITY) 0.75 MG/0.5ML Solution Pen-injector; Inject 0.75 mg as instructed every 7 days.  Dispense: 4 PEN; Refill: 6  - Comp Metabolic Panel; Future  - CBC WITH DIFFERENTIAL; Future  - Lipid Profile; Future  - HEMOGLOBIN A1C; Future  - MICROALBUMIN CREAT RATIO URINE; Future      Healthcare maintenance     Health Maintenance Due   Topic Date Due   • IMM INFLUENZA (1) 2018       Return in about 3 months (around 6/15/2019) for DM RN and me .    Nehemias Giles MD  Internal Medicine  [facility]                   "

## 2019-04-05 ENCOUNTER — TELEPHONE (OUTPATIENT)
Dept: HEALTH INFORMATION MANAGEMENT | Facility: MEDICAL CENTER | Age: 64
End: 2019-04-05

## 2019-04-05 NOTE — TELEPHONE ENCOUNTER
Elizabeth left a message stating she did not tolerate the Trulicity.  I called her back and she was not there.  Her  states her blood sugars are running high.  I tried calling her back again today but got her voice mail.  We may need to start her on basal insulin.

## 2019-05-10 NOTE — TELEPHONE ENCOUNTER
Was the patient seen in the last year in this department? Yes    Does patient have an active prescription for medications requested? No     Received Request Via: Pharmacy      Pt met protocol?: Yes   Pt last ov 3/2019   BP Readings from Last 1 Encounters:   03/15/19 120/80

## 2019-05-12 RX ORDER — LOSARTAN POTASSIUM AND HYDROCHLOROTHIAZIDE 25; 100 MG/1; MG/1
TABLET ORAL
Qty: 90 TAB | Refills: 0 | Status: SHIPPED | OUTPATIENT
Start: 2019-05-12 | End: 2019-09-10 | Stop reason: SDUPTHER

## 2019-05-17 ENCOUNTER — TELEPHONE (OUTPATIENT)
Dept: MEDICAL GROUP | Facility: PHYSICIAN GROUP | Age: 64
End: 2019-05-17

## 2019-05-17 ENCOUNTER — OFFICE VISIT (OUTPATIENT)
Dept: MEDICAL GROUP | Facility: PHYSICIAN GROUP | Age: 64
End: 2019-05-17
Payer: COMMERCIAL

## 2019-05-17 VITALS
RESPIRATION RATE: 16 BRPM | OXYGEN SATURATION: 97 % | WEIGHT: 149 LBS | TEMPERATURE: 97.7 F | BODY MASS INDEX: 24.83 KG/M2 | HEIGHT: 65 IN | SYSTOLIC BLOOD PRESSURE: 118 MMHG | DIASTOLIC BLOOD PRESSURE: 70 MMHG | HEART RATE: 84 BPM

## 2019-05-17 DIAGNOSIS — E11.9 DIABETES MELLITUS WITHOUT COMPLICATION (HCC): ICD-10-CM

## 2019-05-17 PROCEDURE — 99214 OFFICE O/P EST MOD 30 MIN: CPT | Performed by: INTERNAL MEDICINE

## 2019-05-17 RX ORDER — AMLODIPINE BESYLATE 5 MG/1
TABLET ORAL
COMMUNITY
Start: 2019-05-10 | End: 2019-05-12

## 2019-05-17 ASSESSMENT — PATIENT HEALTH QUESTIONNAIRE - PHQ9: CLINICAL INTERPRETATION OF PHQ2 SCORE: 0

## 2019-05-17 NOTE — PROGRESS NOTES
PRIMARY CARE CLINIC FOLLOW UP VISIT  Chief Complaint   Patient presents with   • Diabetes     Lt eye swelling and redness with trulicity      History of Present Illness     Diabetes mellitus without complication (HCC)  She didn't tolerate trulicity - had low energy, depression symptoms, chest racing. She called the DM RN who told her she may need to try insulin. She then tried trulicity again and had bilateral rey-orbital swelling. She hasn't been on insulin before. Her fasting sugars are still running high 100s to high 200s.     Current Outpatient Prescriptions   Medication Sig Dispense Refill   • insulin glargine (LANTUS) 100 UNIT/ML Solution Pen-injector injection Inject 15 Units as instructed every evening. 5 PEN 0   • Insulin Pen Needle 1 Units by Does not apply route every day. 90 Each 3   • losartan-hydrochlorothiazide (HYZAAR) 100-25 MG per tablet TAKE 1 TABLET BY MOUTH EVERY DAY 90 Tab 0   • simvastatin (ZOCOR) 40 MG Tab TAKE 1 TABLET BY MOUTH IN THE EVENING 90 Tab 0   • glipiZIDE (GLUCOTROL) 10 MG Tab TAKE 1 TABLET BY MOUTH TWICE A  Tab 0   • amLODIPine (NORVASC) 5 MG Tab TAKE 1 TABLET BY MOUTH EVERY DAY 90 Tab 0   • triamcinolone acetonide (KENALOG) 0.1 % Ointment Apply thin layer to affected area twice daily as needed 1 Tube 1   • metformin (GLUCOPHAGE) 1000 MG tablet TAKE 1 TAB BY MOUTH 2 TIMES A DAY, WITH MEALS. 180 Tab 0   • naproxen (NAPROSYN) 500 MG Tab TAKE 1 TAB BY MOUTH 2 TIMES DAILY WITH FOOD  0   • glucose blood strip USE DAILY AND AS NEEDED FOR HIGH OR LOW SUGAR.  1   • Blood Glucose Monitoring Suppl Device Meter: Dispense Device of Insurance Preference. Sig. Use as directed for blood sugar monitoring. #1. NR. 1 Device 0   • Blood Glucose Monitoring Suppl Supplies Misc Test strips order: Test strips for insurance approved meter. Sig: use daily and prn ssx high or low sugar #100 RF x 3 100 Units 1   • Lancets Misc Lancets order: Lancets for insurance approved meter meter. Sig: use daily  and prn ssx high or low sugar. #100 RF x 3 (Patient not taking: Reported on 3/15/2019) 100 Each 1   • fluticasone (FLONASE) 50 MCG/ACT nasal spray Spray 1 Spray in nose every day. 16 g 1   • nystatin (MYCOSTATIN) 647821 UNIT/GM Cream topical cream Apply topically to affected area twice daily 8.5 g 1   • omeprazole (PRILOSEC) 20 MG delayed-release capsule Take 1 Cap by mouth every day. 30 Cap 11   • Cholecalciferol (VITAMIN D PO) Take  by mouth.     • sumatriptan (IMITREX) 100 MG tablet Take 1 Tab by mouth Once PRN for Migraine for up to 1 dose. 10 Tab 3     No current facility-administered medications for this visit.      Past Medical History:   Diagnosis Date   • Allergic rhinitis    • Dalton's esophagus 4/14/2017   • Dental disorder     partial upper   • Diabetes mellitus without complication (HCC) 11/24/2015   • Dyslipidemia    • Essential hypertension 8/25/2015   • GERD (gastroesophageal reflux disease) 5/7/2009   • HTN    • Low back pain    • Migraine headache      Past Surgical History:   Procedure Laterality Date   • ROTATOR CUFF REPAIR Left 08/2017   • OREN BY LAPAROSCOPY Bilateral 8/3/2016    Procedure: OREN BY LAPAROSCOPY;  Surgeon: Scott Canales M.D.;  Location: SURGERY SAME DAY [facility];  Service:    • KNEE ARTHROSCOPY  2/13/2014    Performed by Wiley Kenyon M.D. at SURGERY [facility] ORS   • MEDIAL MENISCECTOMY  2/13/2014    Performed by Wiley Kenyon M.D. at SURGERY [facility] ORS   • SEPTOTURBINOPLASTY  3/30/2010    Performed by TRAV MENJIVAR at SURGERY SAME DAY [facility] ORS   • SIGMOIDOSCOPY FLEX  3/10    normal   • COLONOSCOPY  3/09    normal   • ANKLE ORIF  5/07   • APPENDECTOMY  2003   • ABDOMINAL HYSTERECTOMY TOTAL  2002    partial due to fibroids     Social History   Substance Use Topics   • Smoking status: Former Smoker     Packs/day: 0.50     Years: 20.00     Types: Cigarettes     Quit date: 1/1/2006   • Smokeless tobacco: Never Used      Comment: 5 cig's a day for 20 yrs quit in 2006  "  • Alcohol use 0.0 oz/week      Comment: 2 per month,  beer     Social History     Social History Narrative     at [employer]     Family History   Problem Relation Age of Onset   • Hypertension Mother    • Cancer Mother 85        colon   • Hypertension Father    • Cancer Father         prostate     Family Status   Relation Status   • Mo  at age 96 y.o.        pneumonia   • Fa Alive   • Sis Alive   • Sis Alive     Allergies: Patient has no known allergies.    ROS  As per HPI above. All other systems reviewed and negative.        Objective   /70   Pulse 84   Temp 36.5 °C (97.7 °F)   Resp 16   Ht 1.651 m (5' 5\")   Wt 67.6 kg (149 lb)   SpO2 97%  Body mass index is 24.79 kg/m².    General: alert and oriented, pleasant, cooperative  HEENT: Normocephalic, atraumatic. Left rey-orbital swelling   Skin: warm and dry, no lesions or rashes  Psychiatric: appropriate mood and affect. Good insight and appropriate judgment       Assessment and Plan   The following treatment plan was discussed     1. Diabetes mellitus without complication (HCC)  Uncontrolled, A1c at 10% on metformin and glimepiride. Start insulin, starting at 15 units every evening and increasing by 2-3 units until fasting sugars are closer to 100. Follows up with me and DM RN on 2019.   - insulin glargine (LANTUS) 100 UNIT/ML Solution Pen-injector injection; Inject 15 Units as instructed every evening.  Dispense: 5 PEN; Refill: 0  - Insulin Pen Needle; 1 Units by Does not apply route every day.  Dispense: 90 Each; Refill: 3    Healthcare maintenance     Health Maintenance Due   Topic Date Due   • FASTING LIPID PROFILE  2019   • URINE ACR / MICROALBUMIN  2019   • SERUM CREATININE  2019       Return in about 5 weeks (around 2019).    Nehemias Giles MD  Internal Medicine  [facility]                   "

## 2019-05-17 NOTE — PATIENT INSTRUCTIONS
[facility]       Start with insulin 15 units at bedtime. Increase by 2-3 units a night until morning fasting sugar is 100

## 2019-05-17 NOTE — ASSESSMENT & PLAN NOTE
She didn't tolerate trulicity - had low energy, depression symptoms, chest racing. She called the DM RN who told her she may need to try insulin. She then tried trulicity again and had bilateral rey-orbital swelling. She hasn't been on insulin before. Her fasting sugars are still running high 100s to high 200s.

## 2019-05-17 NOTE — TELEPHONE ENCOUNTER
1. Caller Name: Elizabeth                                          Call Back Number: [phone] (home)        Patient approves a detailed voicemail message: N\A    Pt called and stated that the Rx Lantus was going to cost her $500. Would you like the a Prior-Auth to be intiated or an alternative rx to be prescribed. Please advise.

## 2019-06-13 DIAGNOSIS — E78.5 DYSLIPIDEMIA: Chronic | ICD-10-CM

## 2019-06-14 RX ORDER — SIMVASTATIN 40 MG
TABLET ORAL
Qty: 90 TAB | Refills: 0 | Status: SHIPPED | OUTPATIENT
Start: 2019-06-14 | End: 2019-09-10 | Stop reason: SDUPTHER

## 2019-06-14 NOTE — TELEPHONE ENCOUNTER
Was the patient seen in the last year in this department? Yes    Does patient have an active prescription for medications requested? No     Received Request Via: Pharmacy      Pt met protocol?: NO    OV 5/19      Lab Results  Component Value Date/Time   CHOLSTRLTOT 170 04/21/2018 0836   TRIGLYCERIDE 93 04/21/2018 0836   HDL 62 04/21/2018 0836   LDL 89 04/21/2018 0836

## 2019-06-15 ENCOUNTER — HOSPITAL ENCOUNTER (OUTPATIENT)
Dept: LAB | Facility: MEDICAL CENTER | Age: 64
End: 2019-06-15
Attending: INTERNAL MEDICINE
Payer: COMMERCIAL

## 2019-06-15 DIAGNOSIS — E11.9 DIABETES MELLITUS WITHOUT COMPLICATION (HCC): ICD-10-CM

## 2019-06-15 LAB
ALBUMIN SERPL BCP-MCNC: 4.1 G/DL (ref 3.2–4.9)
ALBUMIN/GLOB SERPL: 1.3 G/DL
ALP SERPL-CCNC: 89 U/L (ref 30–99)
ALT SERPL-CCNC: 52 U/L (ref 2–50)
ANION GAP SERPL CALC-SCNC: 7 MMOL/L (ref 0–11.9)
AST SERPL-CCNC: 39 U/L (ref 12–45)
BASOPHILS # BLD AUTO: 0.5 % (ref 0–1.8)
BASOPHILS # BLD: 0.04 K/UL (ref 0–0.12)
BILIRUB SERPL-MCNC: 0.5 MG/DL (ref 0.1–1.5)
BUN SERPL-MCNC: 12 MG/DL (ref 8–22)
CALCIUM SERPL-MCNC: 9.6 MG/DL (ref 8.5–10.5)
CHLORIDE SERPL-SCNC: 103 MMOL/L (ref 96–112)
CHOLEST SERPL-MCNC: 168 MG/DL (ref 100–199)
CO2 SERPL-SCNC: 27 MMOL/L (ref 20–33)
CREAT SERPL-MCNC: 0.54 MG/DL (ref 0.5–1.4)
CREAT UR-MCNC: 228.5 MG/DL
EOSINOPHIL # BLD AUTO: 0.12 K/UL (ref 0–0.51)
EOSINOPHIL NFR BLD: 1.6 % (ref 0–6.9)
ERYTHROCYTE [DISTWIDTH] IN BLOOD BY AUTOMATED COUNT: 43.2 FL (ref 35.9–50)
FASTING STATUS PATIENT QL REPORTED: NORMAL
GLOBULIN SER CALC-MCNC: 3.1 G/DL (ref 1.9–3.5)
GLUCOSE SERPL-MCNC: 249 MG/DL (ref 65–99)
HCT VFR BLD AUTO: 47.7 % (ref 37–47)
HDLC SERPL-MCNC: 51 MG/DL
HGB BLD-MCNC: 15.2 G/DL (ref 12–16)
IMM GRANULOCYTES # BLD AUTO: 0.04 K/UL (ref 0–0.11)
IMM GRANULOCYTES NFR BLD AUTO: 0.5 % (ref 0–0.9)
LDLC SERPL CALC-MCNC: 94 MG/DL
LYMPHOCYTES # BLD AUTO: 1.48 K/UL (ref 1–4.8)
LYMPHOCYTES NFR BLD: 19.7 % (ref 22–41)
MCH RBC QN AUTO: 29 PG (ref 27–33)
MCHC RBC AUTO-ENTMCNC: 31.9 G/DL (ref 33.6–35)
MCV RBC AUTO: 90.9 FL (ref 81.4–97.8)
MICROALBUMIN UR-MCNC: 2.7 MG/DL
MICROALBUMIN/CREAT UR: 12 MG/G (ref 0–30)
MONOCYTES # BLD AUTO: 0.68 K/UL (ref 0–0.85)
MONOCYTES NFR BLD AUTO: 9 % (ref 0–13.4)
NEUTROPHILS # BLD AUTO: 5.17 K/UL (ref 2–7.15)
NEUTROPHILS NFR BLD: 68.7 % (ref 44–72)
NRBC # BLD AUTO: 0 K/UL
NRBC BLD-RTO: 0 /100 WBC
PLATELET # BLD AUTO: 306 K/UL (ref 164–446)
PMV BLD AUTO: 10.3 FL (ref 9–12.9)
POTASSIUM SERPL-SCNC: 4.6 MMOL/L (ref 3.6–5.5)
PROT SERPL-MCNC: 7.2 G/DL (ref 6–8.2)
RBC # BLD AUTO: 5.25 M/UL (ref 4.2–5.4)
SODIUM SERPL-SCNC: 137 MMOL/L (ref 135–145)
TRIGL SERPL-MCNC: 113 MG/DL (ref 0–149)
WBC # BLD AUTO: 7.5 K/UL (ref 4.8–10.8)

## 2019-06-15 PROCEDURE — 82043 UR ALBUMIN QUANTITATIVE: CPT

## 2019-06-15 PROCEDURE — 82570 ASSAY OF URINE CREATININE: CPT

## 2019-06-15 PROCEDURE — 85025 COMPLETE CBC W/AUTO DIFF WBC: CPT

## 2019-06-15 PROCEDURE — 36415 COLL VENOUS BLD VENIPUNCTURE: CPT

## 2019-06-15 PROCEDURE — 83036 HEMOGLOBIN GLYCOSYLATED A1C: CPT

## 2019-06-15 PROCEDURE — 80061 LIPID PANEL: CPT

## 2019-06-15 PROCEDURE — 80053 COMPREHEN METABOLIC PANEL: CPT

## 2019-06-16 LAB
EST. AVERAGE GLUCOSE BLD GHB EST-MCNC: 266 MG/DL
HBA1C MFR BLD: 10.9 % (ref 0–5.6)

## 2019-06-17 ENCOUNTER — TELEPHONE (OUTPATIENT)
Dept: MEDICAL GROUP | Facility: PHYSICIAN GROUP | Age: 64
End: 2019-06-17

## 2019-06-17 NOTE — TELEPHONE ENCOUNTER
----- Message from Nehemias Giles M.D. sent at 6/17/2019 10:34 AM PDT -----  Please let her know her A1c is at 10.9%, we will discuss further during her appt with me and the DM RN on 6/21/2019

## 2019-06-21 ENCOUNTER — OFFICE VISIT (OUTPATIENT)
Dept: MEDICAL GROUP | Facility: PHYSICIAN GROUP | Age: 64
End: 2019-06-21
Payer: COMMERCIAL

## 2019-06-21 VITALS
TEMPERATURE: 97.4 F | DIASTOLIC BLOOD PRESSURE: 64 MMHG | OXYGEN SATURATION: 98 % | HEIGHT: 65 IN | HEART RATE: 80 BPM | BODY MASS INDEX: 24.99 KG/M2 | WEIGHT: 150 LBS | SYSTOLIC BLOOD PRESSURE: 110 MMHG

## 2019-06-21 DIAGNOSIS — E11.9 DIABETES MELLITUS WITHOUT COMPLICATION (HCC): ICD-10-CM

## 2019-06-21 PROCEDURE — 99214 OFFICE O/P EST MOD 30 MIN: CPT | Performed by: INTERNAL MEDICINE

## 2019-06-21 NOTE — PROGRESS NOTES
RN-GAETANO Note    Subjective:     Health changes since last visit/interval Hx: She found a program to help her with the cost of her Lantus insulin.  Having stress with husbands lung cancer.  She did not tolerate the Trulicity.    Medications (including changes made today)  Current Outpatient Prescriptions   Medication Sig Dispense Refill   • simvastatin (ZOCOR) 40 MG Tab TAKE 1 TABLET BY MOUTH EVERY DAY IN THE EVENING 90 Tab 0   • insulin glargine (LANTUS) 100 UNIT/ML Solution Pen-injector injection Inject 15 Units as instructed every evening. (Patient taking differently: Inject 20 Units as instructed every evening.) 5 PEN 0   • Insulin Pen Needle 1 Units by Does not apply route every day. 90 Each 3   • losartan-hydrochlorothiazide (HYZAAR) 100-25 MG per tablet TAKE 1 TABLET BY MOUTH EVERY DAY 90 Tab 0   • glipiZIDE (GLUCOTROL) 10 MG Tab TAKE 1 TABLET BY MOUTH TWICE A  Tab 0   • amLODIPine (NORVASC) 5 MG Tab TAKE 1 TABLET BY MOUTH EVERY DAY 90 Tab 0   • metformin (GLUCOPHAGE) 1000 MG tablet TAKE 1 TAB BY MOUTH 2 TIMES A DAY, WITH MEALS. 180 Tab 0   • glucose blood strip USE DAILY AND AS NEEDED FOR HIGH OR LOW SUGAR.  1   • Blood Glucose Monitoring Suppl Supplies Misc Test strips order: Test strips for insurance approved meter. Sig: use daily and prn ssx high or low sugar #100 RF x 3 100 Units 1   • Lancets Misc Lancets order: Lancets for insurance approved meter meter. Sig: use daily and prn ssx high or low sugar. #100 RF x 3 100 Each 1   • Cholecalciferol (VITAMIN D PO) Take  by mouth.     • naproxen (NAPROSYN) 500 MG Tab TAKE 1 TAB BY MOUTH 2 TIMES DAILY WITH FOOD  0   • Blood Glucose Monitoring Suppl Device Meter: Dispense Device of Insurance Preference. Sig. Use as directed for blood sugar monitoring. #1. NR. 1 Device 0   • fluticasone (FLONASE) 50 MCG/ACT nasal spray Spray 1 Spray in nose every day. 16 g 1     No current facility-administered medications for this visit.        Taking daily ASA: No  Taking  "above medications as prescribed: yes  SIDE EFFECTS: Patient denies side effects to medications    Exercise: Walking  Diet: \"healthy\" diet  in general  Patient's body mass index is 24.96 kg/m². Exercise and nutrition counseling were performed at this visit.      Health Maintenance:   Health Maintenance Due   Topic Date Due   • HEPATITIS C SCREENING  1955   • IMM ZOSTER VACCINES (1 of 2) 08/18/2015           DM:   Last A1c:   Lab Results   Component Value Date/Time    HBA1C 10.9 (H) 06/15/2019 09:07 AM      A1C GOAL: < 7    Glucose monitoring frequency: One to two times daily  111-188  Hypoglycemic episodes: no    Last Retinal Exam: on file and up-to-date  Daily Foot Exam: Yes   Routine Dental Exams: Yes    Lab Results   Component Value Date/Time    MALBCRT 12 06/15/2019 09:08 AM    MICROALBUR 2.7 06/15/2019 09:08 AM        ACR Albumin/Creatinine Ratio goal <30     HTN:   Blood pressure goal <140/<80 .   Currently Rx ACE/ARB: Yes    Dyslipidemia:    Lab Results   Component Value Date/Time    CHOLSTRLTOT 168 06/15/2019 09:07 AM    LDL 94 06/15/2019 09:07 AM    HDL 51 06/15/2019 09:07 AM    TRIGLYCERIDE 113 06/15/2019 09:07 AM       Lab Results   Component Value Date/Time    SODIUM 137 06/15/2019 09:07 AM    POTASSIUM 4.6 06/15/2019 09:07 AM    CHLORIDE 103 06/15/2019 09:07 AM    CO2 27 06/15/2019 09:07 AM    GLUCOSE 249 (H) 06/15/2019 09:07 AM    BUN 12 06/15/2019 09:07 AM    CREATININE 0.54 06/15/2019 09:07 AM    CREATININE 0.68 02/09/2011 08:05 AM    BUNCREATRAT 19 02/09/2011 08:05 AM    GLOMRATE >59 02/09/2011 08:05 AM     Lab Results   Component Value Date/Time    ALKPHOSPHAT 89 06/15/2019 09:07 AM    ASTSGOT 39 06/15/2019 09:07 AM    ALTSGPT 52 (H) 06/15/2019 09:07 AM    TBILIRUBIN 0.5 06/15/2019 09:07 AM        Currently Rx Statin: Yes    She  reports that she quit smoking about 13 years ago. Her smoking use included Cigarettes. She has a 10.00 pack-year smoking history. She has never used smokeless " tobacco.    Objective:     Exam:  Monofilament: done   Monofilament testing with a 10 gram force: sensation intact: intact bilaterally  Visual Inspection: Feet without maceration, ulcers, fissures.  Pedal pulses: intact bilaterally    Plan:     Discussed and educated on:   - All medications, side effects and compliance (discussed carefully)  - Annual eye examinations at Ophthalmology  - Home glucose monitoring emphasized  - Weight control and daily exercise    Recommended medication changes: She will increase her Lantus to 20 units.  She will increase her Lantus as needed to keep her fasting blood sugars in the  range.  She will stay well hydrated.

## 2019-06-21 NOTE — ASSESSMENT & PLAN NOTE
Elizabeth didn't tolerate trulicity and when she started lantus at first it wasn't covered but now she has a coupon for which it's $0 co-pay for 2 years. She is taking lantus 15 units at night. Fasting sugars have ranged 111, 165, 188. Her post-prandial sugars are typically > 200. Her  has also been diagnosed with presumed lung cancer which is stressing her out.

## 2019-06-24 DIAGNOSIS — E11.9 DIABETES MELLITUS WITHOUT COMPLICATION (HCC): ICD-10-CM

## 2019-06-24 RX ORDER — INSULIN GLARGINE 100 [IU]/ML
INJECTION, SOLUTION SUBCUTANEOUS
Refills: 0 | OUTPATIENT
Start: 2019-06-24

## 2019-07-18 DIAGNOSIS — E11.9 DIABETES MELLITUS WITHOUT COMPLICATION (HCC): ICD-10-CM

## 2019-07-18 NOTE — TELEPHONE ENCOUNTER
Was the patient seen in the last year in this department? Yes    Does patient have an active prescription for medications requested? No     Received Request Via: Pharmacy      Pt met protocol?: Yes    OV 6/19     A1C 6/19

## 2019-07-19 RX ORDER — INSULIN GLARGINE 100 [IU]/ML
INJECTION, SOLUTION SUBCUTANEOUS
Qty: 15 ML | Refills: 0 | Status: SHIPPED | OUTPATIENT
Start: 2019-07-19 | End: 2019-08-20 | Stop reason: SDUPTHER

## 2019-08-14 NOTE — TELEPHONE ENCOUNTER
Was the patient seen in the last year in this department? Yes    Does patient have an active prescription for medications requested? No     Received Request Via: Pharmacy      Pt met protocol?: Yes, OV 6/19   BP Readings from Last 1 Encounters:   06/21/19 110/64

## 2019-08-15 RX ORDER — AMLODIPINE BESYLATE 5 MG/1
TABLET ORAL
Qty: 90 TAB | Refills: 0 | Status: SHIPPED | OUTPATIENT
Start: 2019-08-15 | End: 2019-09-10 | Stop reason: SDUPTHER

## 2019-08-16 ENCOUNTER — OFFICE VISIT (OUTPATIENT)
Dept: MEDICAL GROUP | Facility: PHYSICIAN GROUP | Age: 64
End: 2019-08-16
Payer: COMMERCIAL

## 2019-08-16 VITALS
OXYGEN SATURATION: 97 % | DIASTOLIC BLOOD PRESSURE: 60 MMHG | BODY MASS INDEX: 25.66 KG/M2 | HEART RATE: 92 BPM | WEIGHT: 154 LBS | SYSTOLIC BLOOD PRESSURE: 124 MMHG | HEIGHT: 65 IN | TEMPERATURE: 97.7 F

## 2019-08-16 DIAGNOSIS — R09.82 POST-NASAL DRIP: ICD-10-CM

## 2019-08-16 DIAGNOSIS — E11.65 UNCONTROLLED TYPE 2 DIABETES MELLITUS WITH HYPERGLYCEMIA (HCC): ICD-10-CM

## 2019-08-16 LAB
HBA1C MFR BLD: 9.4 % (ref 0–5.6)
INT CON NEG: ABNORMAL
INT CON POS: ABNORMAL

## 2019-08-16 PROCEDURE — 83036 HEMOGLOBIN GLYCOSYLATED A1C: CPT | Performed by: INTERNAL MEDICINE

## 2019-08-16 PROCEDURE — 99214 OFFICE O/P EST MOD 30 MIN: CPT | Performed by: INTERNAL MEDICINE

## 2019-08-16 NOTE — PROGRESS NOTES
PRIMARY CARE CLINIC FOLLOW UP VISIT  Chief Complaint   Patient presents with   • Diabetes     History of Present Illness     Diabetes mellitus without complication (HCC)  A1c at 9.4% today. Her morning sugar was 133 on her home meter, 150s in the office. She is taking metformin 1 g bid, glipizide 10 mg bid, and lantus 23 units qHS. She is waking up with headaches every morning.     Post-nasal drip  She has been experiencing some throat soreness and also post nasal drip.     Current Outpatient Medications   Medication Sig Dispense Refill   • amLODIPine (NORVASC) 5 MG Tab TAKE 1 TABLET BY MOUTH EVERY DAY 90 Tab 0   • LANTUS SOLOSTAR 100 UNIT/ML Solution Pen-injector injection INJECT 15 UNITS AS INSTRUCTED BY MD EVERY EVENING. (Patient taking differently: 23 Units.) 15 mL 0   • simvastatin (ZOCOR) 40 MG Tab TAKE 1 TABLET BY MOUTH EVERY DAY IN THE EVENING 90 Tab 0   • Insulin Pen Needle 1 Units by Does not apply route every day. 90 Each 3   • glipiZIDE (GLUCOTROL) 10 MG Tab TAKE 1 TABLET BY MOUTH TWICE A  Tab 0   • metformin (GLUCOPHAGE) 1000 MG tablet TAKE 1 TAB BY MOUTH 2 TIMES A DAY, WITH MEALS. 180 Tab 0   • naproxen (NAPROSYN) 500 MG Tab TAKE 1 TAB BY MOUTH 2 TIMES DAILY WITH FOOD  0   • glucose blood strip USE DAILY AND AS NEEDED FOR HIGH OR LOW SUGAR.  1   • Blood Glucose Monitoring Suppl Device Meter: Dispense Device of Insurance Preference. Sig. Use as directed for blood sugar monitoring. #1. NR. 1 Device 0   • Blood Glucose Monitoring Suppl Supplies Misc Test strips order: Test strips for insurance approved meter. Sig: use daily and prn ssx high or low sugar #100 RF x 3 100 Units 1   • Lancets Misc Lancets order: Lancets for insurance approved meter meter. Sig: use daily and prn ssx high or low sugar. #100 RF x 3 100 Each 1   • fluticasone (FLONASE) 50 MCG/ACT nasal spray Spray 1 Spray in nose every day. 16 g 1   • losartan-hydrochlorothiazide (HYZAAR) 100-25 MG per tablet TAKE 1 TABLET BY MOUTH EVERY  DAY (Patient not taking: Reported on 2019) 90 Tab 0   • Cholecalciferol (VITAMIN D PO) Take  by mouth.       No current facility-administered medications for this visit.      Past Medical History:   Diagnosis Date   • Allergic rhinitis    • Dalton's esophagus 2017   • Dental disorder     partial upper   • Diabetes mellitus without complication (HCC) 2015   • Dyslipidemia    • Essential hypertension 2015   • GERD (gastroesophageal reflux disease) 2009   • HTN    • Low back pain    • Migraine headache      Past Surgical History:   Procedure Laterality Date   • ROTATOR CUFF REPAIR Left 2017   • OREN BY LAPAROSCOPY Bilateral 8/3/2016    Procedure: OREN BY LAPAROSCOPY;  Surgeon: Scott Canales M.D.;  Location: SURGERY SAME DAY [facility];  Service:    • KNEE ARTHROSCOPY  2014    Performed by Wiley Kenyon M.D. at SURGERY [facility]   • MEDIAL MENISCECTOMY  2014    Performed by Wiley Kenyon M.D. at SURGERY [facility] ORS   • SEPTOTURBINOPLASTY  3/30/2010    Performed by TRAV MENJIVAR at SURGERY SAME DAY [facility] ORS   • SIGMOIDOSCOPY FLEX  3/10    normal   • COLONOSCOPY  3/09    normal   • ANKLE ORIF     • APPENDECTOMY     • ABDOMINAL HYSTERECTOMY TOTAL      partial due to fibroids     Social History     Tobacco Use   • Smoking status: Former Smoker     Packs/day: 0.50     Years: 20.00     Pack years: 10.00     Types: Cigarettes     Last attempt to quit: 2006     Years since quittin.6   • Smokeless tobacco: Never Used   • Tobacco comment: 5 cig's a day for 20 yrs quit in    Substance Use Topics   • Alcohol use: Yes     Alcohol/week: 0.0 oz     Comment: 2 per month,  beer   • Drug use: No     Social History     Social History Narrative     at [employer]     Family History   Problem Relation Age of Onset   • Hypertension Mother    • Cancer Mother 85        colon   • Hypertension Father    • Cancer Father         prostate     Family Status   Relation  "Name Status   • Mo   at age 96 y.o.        pneumonia   • Fa  Alive   • Sis  Alive   • Sis  Alive     Allergies: Patient has no known allergies.    ROS  As per HPI above. All other systems reviewed and negative.        Objective   /60   Pulse 92   Temp 36.5 °C (97.7 °F)   Ht 1.651 m (5' 5\")   Wt 69.9 kg (154 lb)   SpO2 97%  Body mass index is 25.63 kg/m².    General: alert and oriented, pleasant, cooperative  HEENT: Normocephalic, atraumatic.   Psychiatric: appropriate mood and affect. Good insight and appropriate judgment     Assessment and Plan   The following treatment plan was discussed     1. Uncontrolled type 2 diabetes mellitus with hyperglycemia (HCC)  A1c still at 9.4%, discussed titrating her lantus up a couple units every few days until fasting sugars closer to 100. Advised to keep blood sugar log and follow up with me and DM RN in 6 weeks. Also advised to increase hydration to see if this helps reduce her headache frequency.   - Diabetic Monofilament LE Exam  - POCT  A1C    2. Post-nasal drip  Discussed otc antihistamine and nasal steroid spray.     Healthcare maintenance     Health Maintenance Due   Topic Date Due   • HEPATITIS C SCREENING  1955     Return in about 6 weeks (around 2019) for DM RN .    Nehemias Giles MD  Internal Medicine  [facility]                   "

## 2019-08-16 NOTE — PROGRESS NOTES
RN-SHIVANIE Note    Subjective:     Health changes since last visit/interval Hx: Having more headaches in the morning.    Medications (including changes made today)  Current Outpatient Medications   Medication Sig Dispense Refill   • amLODIPine (NORVASC) 5 MG Tab TAKE 1 TABLET BY MOUTH EVERY DAY 90 Tab 0   • LANTUS SOLOSTAR 100 UNIT/ML Solution Pen-injector injection INJECT 15 UNITS AS INSTRUCTED BY MD EVERY EVENING. (Patient taking differently: 23 Units.) 15 mL 0   • simvastatin (ZOCOR) 40 MG Tab TAKE 1 TABLET BY MOUTH EVERY DAY IN THE EVENING 90 Tab 0   • Insulin Pen Needle 1 Units by Does not apply route every day. 90 Each 3   • glipiZIDE (GLUCOTROL) 10 MG Tab TAKE 1 TABLET BY MOUTH TWICE A  Tab 0   • metformin (GLUCOPHAGE) 1000 MG tablet TAKE 1 TAB BY MOUTH 2 TIMES A DAY, WITH MEALS. 180 Tab 0   • naproxen (NAPROSYN) 500 MG Tab TAKE 1 TAB BY MOUTH 2 TIMES DAILY WITH FOOD  0   • glucose blood strip USE DAILY AND AS NEEDED FOR HIGH OR LOW SUGAR.  1   • Blood Glucose Monitoring Suppl Device Meter: Dispense Device of Insurance Preference. Sig. Use as directed for blood sugar monitoring. #1. NR. 1 Device 0   • Blood Glucose Monitoring Suppl Supplies Misc Test strips order: Test strips for insurance approved meter. Sig: use daily and prn ssx high or low sugar #100 RF x 3 100 Units 1   • Lancets Misc Lancets order: Lancets for insurance approved meter meter. Sig: use daily and prn ssx high or low sugar. #100 RF x 3 100 Each 1   • fluticasone (FLONASE) 50 MCG/ACT nasal spray Spray 1 Spray in nose every day. 16 g 1   • losartan-hydrochlorothiazide (HYZAAR) 100-25 MG per tablet TAKE 1 TABLET BY MOUTH EVERY DAY (Patient not taking: Reported on 8/16/2019) 90 Tab 0   • Cholecalciferol (VITAMIN D PO) Take  by mouth.       No current facility-administered medications for this visit.        Taking daily ASA: No  Taking above medications as prescribed: yes  SIDE EFFECTS: Patient denies side effects to medications    Exercise:  Walking  Diet: Decreased appetite and eating one to two times daily.  Patient's body mass index is 25.63 kg/m². Exercise and nutrition counseling were performed at this visit.      Health Maintenance:   Health Maintenance Due   Topic Date Due   • HEPATITIS C SCREENING  1955           DM:   Last A1c:   Lab Results   Component Value Date/Time    HBA1C 10.9 (H) 06/15/2019 09:07 AM      A1C GOAL: < 7    Glucose monitoring frequency: 130-200's in the morning.    Hypoglycemic episodes: no    Last Retinal Exam: on file and up-to-date  Daily Foot Exam: Yes   Routine Dental Exams: Yes    Lab Results   Component Value Date/Time    MALBCRT 12 06/15/2019 09:08 AM    MICROALBUR 2.7 06/15/2019 09:08 AM        ACR Albumin/Creatinine Ratio goal <30     HTN:   Blood pressure goal <140/<80 .   Currently Rx ACE/ARB: No    Dyslipidemia:    Lab Results   Component Value Date/Time    CHOLSTRLTOT 168 06/15/2019 09:07 AM    LDL 94 06/15/2019 09:07 AM    HDL 51 06/15/2019 09:07 AM    TRIGLYCERIDE 113 06/15/2019 09:07 AM       Lab Results   Component Value Date/Time    SODIUM 137 06/15/2019 09:07 AM    POTASSIUM 4.6 06/15/2019 09:07 AM    CHLORIDE 103 06/15/2019 09:07 AM    CO2 27 06/15/2019 09:07 AM    GLUCOSE 249 (H) 06/15/2019 09:07 AM    BUN 12 06/15/2019 09:07 AM    CREATININE 0.54 06/15/2019 09:07 AM    CREATININE 0.68 02/09/2011 08:05 AM    BUNCREATRAT 19 02/09/2011 08:05 AM    GLOMRATE >59 02/09/2011 08:05 AM     Lab Results   Component Value Date/Time    ALKPHOSPHAT 89 06/15/2019 09:07 AM    ASTSGOT 39 06/15/2019 09:07 AM    ALTSGPT 52 (H) 06/15/2019 09:07 AM    TBILIRUBIN 0.5 06/15/2019 09:07 AM        Currently Rx Statin: Yes    She  reports that she quit smoking about 13 years ago. Her smoking use included cigarettes. She has a 10.00 pack-year smoking history. She has never used smokeless tobacco.    Objective:     Exam:  Monofilament: done   Monofilament testing with a 10 gram force: sensation intact: intact  bilaterally  Visual Inspection: Feet without maceration, ulcers, fissures.  Pedal pulses: intact bilaterally    Plan:     Discussed and educated on:   - All medications, side effects and compliance (discussed carefully)  - Annual eye examinations at Ophthalmology  - Home glucose monitoring emphasized  - Weight control and daily exercise    Recommended medication changes: She will increase try to drink more water.  Increase Lantus to 25 units daily.

## 2019-08-16 NOTE — ASSESSMENT & PLAN NOTE
A1c at 9.4% today. Her morning sugar was 133 on her home meter, 150s in the office. She is taking metformin 1 g bid, glipizide 10 mg bid, and lantus 23 units qHS. She is waking up with headaches every morning.

## 2019-08-20 DIAGNOSIS — E11.9 DIABETES MELLITUS WITHOUT COMPLICATION (HCC): ICD-10-CM

## 2019-08-20 NOTE — TELEPHONE ENCOUNTER
Was the patient seen in the last year in this department? Yes    Does patient have an active prescription for medications requested? No     Received Request Via: Pharmacy      Pt met protocol?: Yes    OV 8/19

## 2019-08-28 DIAGNOSIS — E11.9 DIABETES MELLITUS WITHOUT COMPLICATION (HCC): ICD-10-CM

## 2019-08-28 NOTE — TELEPHONE ENCOUNTER
Was the patient seen in the last year in this department? Yes    Does patient have an active prescription for medications requested? No     Received Request Via: Patient      Pt met protocol?: Yes    OV 8/19

## 2019-08-29 NOTE — TELEPHONE ENCOUNTER
Patient has recently been seen by PCP within the last 6 months per protocol (8/19). Will refill DM supplies for 12 months.  Lab Results   Component Value Date/Time    HBA1C 9.4 (A) 08/16/2019 08:59 AM      Lab Results   Component Value Date/Time    MALBCRT 12 06/15/2019 09:08 AM    MICROALBUR 2.7 06/15/2019 09:08 AM      Lab Results   Component Value Date/Time    ALKPHOSPHAT 89 06/15/2019 09:07 AM    ASTSGOT 39 06/15/2019 09:07 AM    ALTSGPT 52 (H) 06/15/2019 09:07 AM    TBILIRUBIN 0.5 06/15/2019 09:07 AM

## 2019-09-10 DIAGNOSIS — E78.5 DYSLIPIDEMIA: Chronic | ICD-10-CM

## 2019-09-11 RX ORDER — SIMVASTATIN 40 MG
40 TABLET ORAL EVERY EVENING
Qty: 90 TAB | Refills: 0 | Status: SHIPPED | OUTPATIENT
Start: 2019-09-11 | End: 2019-09-27 | Stop reason: SDUPTHER

## 2019-09-11 RX ORDER — LOSARTAN POTASSIUM AND HYDROCHLOROTHIAZIDE 25; 100 MG/1; MG/1
TABLET ORAL
Qty: 90 TAB | Refills: 0 | Status: SHIPPED | OUTPATIENT
Start: 2019-09-11 | End: 2019-09-27 | Stop reason: SDUPTHER

## 2019-09-11 RX ORDER — AMLODIPINE BESYLATE 5 MG/1
TABLET ORAL
Qty: 90 TAB | Refills: 0 | Status: SHIPPED | OUTPATIENT
Start: 2019-09-11 | End: 2019-09-27 | Stop reason: SDUPTHER

## 2019-09-11 NOTE — TELEPHONE ENCOUNTER
Was the patient seen in the last year in this department? Yes    Does patient have an active prescription for medications requested? No     Received Request Via: Pharmacy    Pt met protocol?: Yes     Last OV 08/16/19    BP Readings from Last 1 Encounters:   08/16/19 124/60     Lab Results   Component Value Date/Time    CHOLSTRLTOT 168 06/15/2019 0907    TRIGLYCERIDE 113 06/15/2019 0907    HDL 51 06/15/2019 0907    LDL 94 06/15/2019 0907     .

## 2019-09-27 ENCOUNTER — OFFICE VISIT (OUTPATIENT)
Dept: MEDICAL GROUP | Facility: PHYSICIAN GROUP | Age: 64
End: 2019-09-27
Payer: COMMERCIAL

## 2019-09-27 VITALS
WEIGHT: 155 LBS | HEIGHT: 65 IN | TEMPERATURE: 97.2 F | SYSTOLIC BLOOD PRESSURE: 122 MMHG | OXYGEN SATURATION: 96 % | BODY MASS INDEX: 25.83 KG/M2 | HEART RATE: 83 BPM | DIASTOLIC BLOOD PRESSURE: 80 MMHG

## 2019-09-27 DIAGNOSIS — E11.65 UNCONTROLLED TYPE 2 DIABETES MELLITUS WITH HYPERGLYCEMIA (HCC): ICD-10-CM

## 2019-09-27 DIAGNOSIS — E11.9 DIABETES MELLITUS WITHOUT COMPLICATION (HCC): ICD-10-CM

## 2019-09-27 DIAGNOSIS — Z23 NEED FOR VACCINATION: ICD-10-CM

## 2019-09-27 DIAGNOSIS — E78.5 DYSLIPIDEMIA: Chronic | ICD-10-CM

## 2019-09-27 LAB
HBA1C MFR BLD: 8.4 % (ref 0–5.6)
INT CON NEG: ABNORMAL
INT CON POS: ABNORMAL

## 2019-09-27 PROCEDURE — 99214 OFFICE O/P EST MOD 30 MIN: CPT | Mod: 25 | Performed by: INTERNAL MEDICINE

## 2019-09-27 PROCEDURE — 83036 HEMOGLOBIN GLYCOSYLATED A1C: CPT | Performed by: INTERNAL MEDICINE

## 2019-09-27 PROCEDURE — 90471 IMMUNIZATION ADMIN: CPT | Performed by: INTERNAL MEDICINE

## 2019-09-27 PROCEDURE — 90686 IIV4 VACC NO PRSV 0.5 ML IM: CPT | Performed by: INTERNAL MEDICINE

## 2019-09-27 RX ORDER — GLIPIZIDE 10 MG/1
10 TABLET ORAL 2 TIMES DAILY
Qty: 180 TAB | Refills: 3 | Status: SHIPPED | OUTPATIENT
Start: 2019-09-27 | End: 2020-03-26 | Stop reason: SDUPTHER

## 2019-09-27 RX ORDER — AMLODIPINE BESYLATE 5 MG/1
5 TABLET ORAL
Qty: 90 TAB | Refills: 3 | Status: SHIPPED | OUTPATIENT
Start: 2019-09-27 | End: 2020-03-26 | Stop reason: SDUPTHER

## 2019-09-27 RX ORDER — LOSARTAN POTASSIUM AND HYDROCHLOROTHIAZIDE 25; 100 MG/1; MG/1
1 TABLET ORAL
Qty: 90 TAB | Refills: 3 | Status: SHIPPED | OUTPATIENT
Start: 2019-09-27 | End: 2020-03-26 | Stop reason: SDUPTHER

## 2019-09-27 RX ORDER — SIMVASTATIN 40 MG
40 TABLET ORAL EVERY EVENING
Qty: 90 TAB | Refills: 3 | Status: SHIPPED | OUTPATIENT
Start: 2019-09-27 | End: 2020-03-26 | Stop reason: SDUPTHER

## 2019-09-27 RX ORDER — PIOGLITAZONEHYDROCHLORIDE 15 MG/1
15 TABLET ORAL DAILY
Qty: 90 TAB | Refills: 3 | Status: SHIPPED | OUTPATIENT
Start: 2019-09-27 | End: 2020-03-26 | Stop reason: SINTOL

## 2019-09-27 NOTE — ASSESSMENT & PLAN NOTE
Didn't receive a prior auth for jardiance. Remains on lantus 30 units at night, metformin 1 g bid, and glipizide 10 mg bid. Her A1c improved to 8.4% from 10.9%.

## 2019-09-27 NOTE — PROGRESS NOTES
PRIMARY CARE CLINIC FOLLOW UP VISIT  Chief Complaint   Patient presents with   • Diabetes     History of Present Illness     Diabetes mellitus without complication (HCC)  Didn't receive a prior auth for jardiance. Remains on lantus 30 units at night, metformin 1 g bid, and glipizide 10 mg bid. Her A1c improved to 8.4% from 10.9%.     Current Outpatient Medications   Medication Sig Dispense Refill   • pioglitazone (ACTOS) 15 MG Tab Take 1 Tab by mouth every day. 90 Tab 3   • simvastatin (ZOCOR) 40 MG Tab Take 1 Tab by mouth every evening. 90 Tab 3   • losartan-hydrochlorothiazide (HYZAAR) 100-25 MG per tablet Take 1 Tab by mouth every day. 90 Tab 3   • amLODIPine (NORVASC) 5 MG Tab Take 1 Tab by mouth every day. 90 Tab 3   • glipiZIDE (GLUCOTROL) 10 MG Tab Take 1 Tab by mouth 2 times a day. TAKE 1 TABLET BY MOUTH TWICE A  Tab 3   • metformin (GLUCOPHAGE) 1000 MG tablet Take 1 Tab by mouth 2 times a day, with meals. 180 Tab 3   • Insulin Pen Needle 1 Units by Does not apply route every day. 100 Each 3   • insulin glargine (LANTUS SOLOSTAR) 100 UNIT/ML Solution Pen-injector injection Inject 23 Units as instructed every evening. (Patient taking differently: Inject 30 Units as instructed every evening.) 15 mL 0   • naproxen (NAPROSYN) 500 MG Tab TAKE 1 TAB BY MOUTH 2 TIMES DAILY WITH FOOD  0   • glucose blood strip USE DAILY AND AS NEEDED FOR HIGH OR LOW SUGAR.  1   • Lancets Misc Lancets order: Lancets for insurance approved meter meter. Sig: use daily and prn ssx high or low sugar. #100 RF x 3 100 Each 1   • fluticasone (FLONASE) 50 MCG/ACT nasal spray Spray 1 Spray in nose every day. 16 g 1   • Blood Glucose Monitoring Suppl Device Meter: Dispense Device of Insurance Preference. Sig. Use as directed for blood sugar monitoring. #1. NR. 1 Device 0   • Blood Glucose Monitoring Suppl Supplies Misc Test strips order: Test strips for insurance approved meter. Sig: use daily and prn ssx high or low sugar #100 RF x 3  100 Units 1   • Cholecalciferol (VITAMIN D PO) Take  by mouth.       No current facility-administered medications for this visit.      Past Medical History:   Diagnosis Date   • Allergic rhinitis    • Dalton's esophagus 2017   • Dental disorder     partial upper   • Diabetes mellitus without complication (HCC) 2015   • Dyslipidemia    • Essential hypertension 2015   • GERD (gastroesophageal reflux disease) 2009   • HTN    • Low back pain    • Migraine headache      Past Surgical History:   Procedure Laterality Date   • ROTATOR CUFF REPAIR Left 2017   • OREN BY LAPAROSCOPY Bilateral 8/3/2016    Procedure: OREN BY LAPAROSCOPY;  Surgeon: Scott Canales M.D.;  Location: SURGERY SAME DAY [facility];  Service:    • KNEE ARTHROSCOPY  2014    Performed by Wiley Kenyon M.D. at SURGERY [facility] ORS   • MEDIAL MENISCECTOMY  2014    Performed by Wiley Kenyon M.D. at SURGERY [facility] ORS   • SEPTOTURBINOPLASTY  3/30/2010    Performed by TRAV MENJIVAR at SURGERY SAME DAY [facility] ORS   • SIGMOIDOSCOPY FLEX  3/10    normal   • COLONOSCOPY  3/09    normal   • ANKLE ORIF     • APPENDECTOMY     • ABDOMINAL HYSTERECTOMY TOTAL      partial due to fibroids     Social History     Tobacco Use   • Smoking status: Former Smoker     Packs/day: 0.50     Years: 20.00     Pack years: 10.00     Types: Cigarettes     Last attempt to quit: 2006     Years since quittin.7   • Smokeless tobacco: Never Used   • Tobacco comment: 5 cig's a day for 20 yrs quit in    Substance Use Topics   • Alcohol use: Yes     Alcohol/week: 0.0 oz     Comment: 2 per month,  beer   • Drug use: No     Social History     Social History Narrative     at [employer]     Family History   Problem Relation Age of Onset   • Hypertension Mother    • Cancer Mother 85        colon   • Hypertension Father    • Cancer Father         prostate     Family Status   Relation Name Status   • Mo   at age 96 y.o.  "       pneumonia   • Fa  Alive   • Sis  Alive   • Sis  Alive     Allergies: Patient has no known allergies.    ROS  As per HPI above. All other systems reviewed and negative.        Objective   /80   Pulse 83   Temp 36.2 °C (97.2 °F)   Ht 1.651 m (5' 5\")   Wt 70.3 kg (155 lb)   SpO2 96%  Body mass index is 25.79 kg/m².    General: alert and oriented, pleasant, cooperative  HEENT: Normocephalic, atraumatic.   Psychiatric: appropriate mood and affect. Good insight and appropriate judgment     Assessment and Plan   The following treatment plan was discussed     1. Uncontrolled type 2 diabetes mellitus with hyperglycemia (HCC)  Now under better control with some dietary compliance. Will send prior auth for jardiance, didn't tolerate truclity. Continue present regimen and add actos 15 mg for now. Follow up in 3 months with DM RN.   - Diabetic Monofilament LE Exam  - POCT  A1C    2. Diabetes mellitus without complication (HCC)    - glipiZIDE (GLUCOTROL) 10 MG Tab; Take 1 Tab by mouth 2 times a day. TAKE 1 TABLET BY MOUTH TWICE A DAY  Dispense: 180 Tab; Refill: 3  - metformin (GLUCOPHAGE) 1000 MG tablet; Take 1 Tab by mouth 2 times a day, with meals.  Dispense: 180 Tab; Refill: 3    3. Dyslipidemia    - simvastatin (ZOCOR) 40 MG Tab; Take 1 Tab by mouth every evening.  Dispense: 90 Tab; Refill: 3    4. Need for vaccination    - Influenza Vaccine Quad Injection (PF)    Healthcare maintenance     Health Maintenance Due   Topic Date Due   • HEPATITIS C SCREENING  1955   • IMM INFLUENZA (1) 09/01/2019       Return in about 3 months (around 12/27/2019).    Nehemias Giles MD  Internal Medicine  [facility]                   "

## 2019-09-27 NOTE — PROGRESS NOTES
RN-SHIVANIE Note    Subjective:     Health changes since last visit/interval Hx: General health is good.  Feet hurt at night.    Medications (including changes made today)  Current Outpatient Medications   Medication Sig Dispense Refill   • simvastatin (ZOCOR) 40 MG Tab Take 1 Tab by mouth every evening. 90 Tab 0   • losartan-hydrochlorothiazide (HYZAAR) 100-25 MG per tablet TAKE 1 TABLET BY MOUTH EVERY DAY 90 Tab 0   • amLODIPine (NORVASC) 5 MG Tab TAKE 1 TABLET BY MOUTH EVERY DAY 90 Tab 0   • Insulin Pen Needle 1 Units by Does not apply route every day. 100 Each 3   • insulin glargine (LANTUS SOLOSTAR) 100 UNIT/ML Solution Pen-injector injection Inject 23 Units as instructed every evening. (Patient taking differently: Inject 30 Units as instructed every evening.) 15 mL 0   • glipiZIDE (GLUCOTROL) 10 MG Tab TAKE 1 TABLET BY MOUTH TWICE A  Tab 0   • naproxen (NAPROSYN) 500 MG Tab TAKE 1 TAB BY MOUTH 2 TIMES DAILY WITH FOOD  0   • glucose blood strip USE DAILY AND AS NEEDED FOR HIGH OR LOW SUGAR.  1   • Lancets Misc Lancets order: Lancets for insurance approved meter meter. Sig: use daily and prn ssx high or low sugar. #100 RF x 3 100 Each 1   • fluticasone (FLONASE) 50 MCG/ACT nasal spray Spray 1 Spray in nose every day. 16 g 1   • metformin (GLUCOPHAGE) 1000 MG tablet TAKE 1 TAB BY MOUTH 2 TIMES A DAY, WITH MEALS. 180 Tab 0   • Blood Glucose Monitoring Suppl Device Meter: Dispense Device of Insurance Preference. Sig. Use as directed for blood sugar monitoring. #1. NR. 1 Device 0   • Blood Glucose Monitoring Suppl Supplies Misc Test strips order: Test strips for insurance approved meter. Sig: use daily and prn ssx high or low sugar #100 RF x 3 100 Units 1   • Cholecalciferol (VITAMIN D PO) Take  by mouth.       No current facility-administered medications for this visit.        Taking daily ASA: No  Taking above medications as prescribed: yes  SIDE EFFECTS: Patient denies side effects to medications    Exercise:  Walking  Diet: Plate method of eating recommended.  Patient's body mass index is 25.79 kg/m². Exercise and nutrition counseling were performed at this visit.      Health Maintenance:   Health Maintenance Due   Topic Date Due   • HEPATITIS C SCREENING  1955   • IMM INFLUENZA (1) 09/01/2019       I    DM:   Last A1c:   Lab Results   Component Value Date/Time    HBA1C 8.4 (A) 09/27/2019 10:11 AM      A1C GOAL: < 7    Glucose monitoring frequency: Testing blood sugars one or two times daily  120-200's  Hypoglycemic episodes: no    Last Retinal Exam: on file and up-to-date  Daily Foot Exam: Yes   Routine Dental Exams: Yes    Lab Results   Component Value Date/Time    MALBCRT 12 06/15/2019 09:08 AM    MICROALBUR 2.7 06/15/2019 09:08 AM        ACR Albumin/Creatinine Ratio goal <30     HTN:   Blood pressure goal <140/<80 .   Currently Rx ACE/ARB: Yes    Dyslipidemia:    Lab Results   Component Value Date/Time    CHOLSTRLTOT 168 06/15/2019 09:07 AM    LDL 94 06/15/2019 09:07 AM    HDL 51 06/15/2019 09:07 AM    TRIGLYCERIDE 113 06/15/2019 09:07 AM       Lab Results   Component Value Date/Time    SODIUM 137 06/15/2019 09:07 AM    POTASSIUM 4.6 06/15/2019 09:07 AM    CHLORIDE 103 06/15/2019 09:07 AM    CO2 27 06/15/2019 09:07 AM    GLUCOSE 249 (H) 06/15/2019 09:07 AM    BUN 12 06/15/2019 09:07 AM    CREATININE 0.54 06/15/2019 09:07 AM    CREATININE 0.68 02/09/2011 08:05 AM    BUNCREATRAT 19 02/09/2011 08:05 AM    GLOMRATE >59 02/09/2011 08:05 AM     Lab Results   Component Value Date/Time    ALKPHOSPHAT 89 06/15/2019 09:07 AM    ASTSGOT 39 06/15/2019 09:07 AM    ALTSGPT 52 (H) 06/15/2019 09:07 AM    TBILIRUBIN 0.5 06/15/2019 09:07 AM        Currently Rx Statin: Yes    She  reports that she quit smoking about 13 years ago. Her smoking use included cigarettes. She has a 10.00 pack-year smoking history. She has never used smokeless tobacco.    Objective:     Exam:  Monofilament: done   Monofilament testing with a 10 gram  force: sensation intact: intact bilaterally  Visual Inspection: Feet without maceration, ulcers, fissures.  Pedal pulses: intact bilaterally    Plan:     Discussed and educated on:   - All medications, side effects and compliance (discussed carefully)  - Annual eye examinations at Ophthalmology  - Home glucose monitoring emphasized  - Weight control and daily exercise    Recommended medication changes: She states the Jardiance was too expensive but a prior authorization was never gotten.  Until we can get a prior authorization then we can add Actos 15 mg daily.  She will also go up on her Lantus until she is waking up around 100.

## 2019-09-30 ENCOUNTER — TELEPHONE (OUTPATIENT)
Dept: MEDICAL GROUP | Facility: PHYSICIAN GROUP | Age: 64
End: 2019-09-30

## 2019-09-30 NOTE — TELEPHONE ENCOUNTER
DOCUMENTATION OF PAR STATUS:    1. Name of Medication & Dose: Jardiance  25 mg    2. Name of Prescription Coverage Company & phone #: [employer]    3. Date Prior Auth Submitted: 09/30/19    4. What information was given to obtain insurance decision? Cover My Meds    5. Prior Auth Status? Pending    6. Patient Notified: no

## 2019-10-02 NOTE — TELEPHONE ENCOUNTER
Phone Number Called: [phone] (home)       Call outcome: left message for patient to call back regarding message below    Message: left msg to call back

## 2019-10-16 ENCOUNTER — TELEPHONE (OUTPATIENT)
Dept: HEALTH INFORMATION MANAGEMENT | Facility: MEDICAL CENTER | Age: 64
End: 2019-10-16

## 2019-10-16 NOTE — TELEPHONE ENCOUNTER
called and states Elizabeth has been having low blood sugars in the 50's in the morning.  She has been taking lantus 36 units and will drop it down to 30 units and then increase by 1-2 units as needed if she wakes up over 110.   They will call if she has any further problems.  They will be calling central scheduling to get in with a new PCP.

## 2019-10-20 DIAGNOSIS — E11.9 DIABETES MELLITUS WITHOUT COMPLICATION (HCC): ICD-10-CM

## 2019-10-21 RX ORDER — INSULIN GLARGINE 100 [IU]/ML
INJECTION, SOLUTION SUBCUTANEOUS
Qty: 15 ML | Refills: 1 | Status: SHIPPED | OUTPATIENT
Start: 2019-10-21 | End: 2020-01-30 | Stop reason: SDUPTHER

## 2019-10-21 NOTE — TELEPHONE ENCOUNTER
Patient has recently been seen by PCP within the last 6 months per protocol (9/19). Will refill medications for 6 months.  Lab Results   Component Value Date/Time    HBA1C 8.4 (A) 09/27/2019 10:11 AM      Lab Results   Component Value Date/Time    MALBCRT 12 06/15/2019 09:08 AM    MICROALBUR 2.7 06/15/2019 09:08 AM      Lab Results   Component Value Date/Time    ALKPHOSPHAT 89 06/15/2019 09:07 AM    ASTSGOT 39 06/15/2019 09:07 AM    ALTSGPT 52 (H) 06/15/2019 09:07 AM    TBILIRUBIN 0.5 06/15/2019 09:07 AM

## 2020-01-28 ENCOUNTER — TELEPHONE (OUTPATIENT)
Dept: MEDICAL GROUP | Facility: PHYSICIAN GROUP | Age: 65
End: 2020-01-28

## 2020-01-28 ENCOUNTER — HOSPITAL ENCOUNTER (OUTPATIENT)
Dept: RADIOLOGY | Facility: MEDICAL CENTER | Age: 65
End: 2020-01-28
Attending: FAMILY MEDICINE
Payer: COMMERCIAL

## 2020-01-28 DIAGNOSIS — Z12.31 OTHER SCREENING MAMMOGRAM: ICD-10-CM

## 2020-01-28 PROCEDURE — 77067 SCR MAMMO BI INCL CAD: CPT

## 2020-01-28 NOTE — TELEPHONE ENCOUNTER
----- Message from Dieudonne Ortiz M.D. sent at 1/28/2020  2:19 PM PST -----  Please call patient and let patient know that her Mammogram is normal but she does have dense breasts, so if she is having any breast concerns  Such as breast pain or breast lumps that she feels then we can do BL breast ultrasounds and please let us know if you want this then we can place the order, if no breast concerns then we can go ahead and do a screening in one year, thank you.

## 2020-01-30 DIAGNOSIS — E11.9 DIABETES MELLITUS WITHOUT COMPLICATION (HCC): ICD-10-CM

## 2020-01-30 NOTE — TELEPHONE ENCOUNTER
Pt is on wait list for new provider    Received request via: Patient/Nay-diabetic nurse    Was the patient seen in the last year in this department? Yes    Does the patient have an active prescription (recently filled or refills available) for medication(s) requested? No

## 2020-01-30 NOTE — TELEPHONE ENCOUNTER
See MA's notes below, pt has met protocol, OV 9/19   Lab Results   Component Value Date/Time    HBA1C 8.4 (A) 09/27/2019 10:11 AM

## 2020-03-26 ENCOUNTER — OFFICE VISIT (OUTPATIENT)
Dept: MEDICAL GROUP | Facility: PHYSICIAN GROUP | Age: 65
End: 2020-03-26
Payer: COMMERCIAL

## 2020-03-26 VITALS
OXYGEN SATURATION: 95 % | SYSTOLIC BLOOD PRESSURE: 132 MMHG | HEART RATE: 80 BPM | HEIGHT: 65 IN | DIASTOLIC BLOOD PRESSURE: 78 MMHG | BODY MASS INDEX: 26.82 KG/M2 | RESPIRATION RATE: 16 BRPM | TEMPERATURE: 97.4 F | WEIGHT: 161 LBS

## 2020-03-26 DIAGNOSIS — I10 ESSENTIAL HYPERTENSION: ICD-10-CM

## 2020-03-26 DIAGNOSIS — K22.70 BARRETT'S ESOPHAGUS WITHOUT DYSPLASIA: ICD-10-CM

## 2020-03-26 DIAGNOSIS — K21.9 GASTROESOPHAGEAL REFLUX DISEASE WITHOUT ESOPHAGITIS: ICD-10-CM

## 2020-03-26 DIAGNOSIS — Z76.89 ENCOUNTER TO ESTABLISH CARE: ICD-10-CM

## 2020-03-26 DIAGNOSIS — E78.5 DYSLIPIDEMIA: ICD-10-CM

## 2020-03-26 DIAGNOSIS — E11.9 DIABETES MELLITUS WITHOUT COMPLICATION (HCC): ICD-10-CM

## 2020-03-26 DIAGNOSIS — G43.909 MIGRAINE WITHOUT STATUS MIGRAINOSUS, NOT INTRACTABLE, UNSPECIFIED MIGRAINE TYPE: ICD-10-CM

## 2020-03-26 DIAGNOSIS — R22.9 LOCALIZED SKIN MASS, LUMP, OR SWELLING: ICD-10-CM

## 2020-03-26 PROCEDURE — 99214 OFFICE O/P EST MOD 30 MIN: CPT | Performed by: NURSE PRACTITIONER

## 2020-03-26 RX ORDER — LOSARTAN POTASSIUM AND HYDROCHLOROTHIAZIDE 25; 100 MG/1; MG/1
1 TABLET ORAL
Qty: 90 TAB | Refills: 3 | Status: SHIPPED | OUTPATIENT
Start: 2020-03-26 | End: 2020-06-01

## 2020-03-26 RX ORDER — LANCETS 30 GAUGE
EACH MISCELLANEOUS
Qty: 100 EACH | Refills: 1 | Status: SHIPPED | OUTPATIENT
Start: 2020-03-26 | End: 2021-11-12

## 2020-03-26 RX ORDER — INSULIN GLARGINE 100 [IU]/ML
INJECTION, SOLUTION SUBCUTANEOUS
Qty: 15 ML | Refills: 3 | Status: SHIPPED | OUTPATIENT
Start: 2020-03-26 | End: 2020-03-31

## 2020-03-26 RX ORDER — SIMVASTATIN 40 MG
40 TABLET ORAL EVERY EVENING
Qty: 90 TAB | Refills: 3 | Status: SHIPPED | OUTPATIENT
Start: 2020-03-26 | End: 2020-11-12 | Stop reason: SDUPTHER

## 2020-03-26 RX ORDER — AMLODIPINE BESYLATE 5 MG/1
5 TABLET ORAL
Qty: 90 TAB | Refills: 3 | Status: SHIPPED | OUTPATIENT
Start: 2020-03-26 | End: 2020-11-12 | Stop reason: SDUPTHER

## 2020-03-26 RX ORDER — GLIPIZIDE 10 MG/1
10 TABLET ORAL 2 TIMES DAILY
Qty: 180 TAB | Refills: 3 | Status: SHIPPED | OUTPATIENT
Start: 2020-03-26 | End: 2020-06-01

## 2020-03-26 ASSESSMENT — FIBROSIS 4 INDEX: FIB4 SCORE: 1.1

## 2020-03-26 ASSESSMENT — PATIENT HEALTH QUESTIONNAIRE - PHQ9: CLINICAL INTERPRETATION OF PHQ2 SCORE: 0

## 2020-03-26 NOTE — ASSESSMENT & PLAN NOTE
Chronic medical problem.  New to examiner.  Patient reports that she gets migraines less frequently now.  No recent migraines.

## 2020-03-26 NOTE — ASSESSMENT & PLAN NOTE
Chronic medical problem. New to examiner. Patient reports a localized lump to her mid chest that is been present for about 3 years.  It is painful to touch, but not painful at rest.  She has not noticed any drainage.  She reports her friend who is a nurse believes it was from an infected hair follicle.  Patient is interested in having this removed.

## 2020-03-26 NOTE — ASSESSMENT & PLAN NOTE
Chronic medical problem.  New to examiner.  Patient will take omeprazole as needed.  No complaints today.

## 2020-03-26 NOTE — ASSESSMENT & PLAN NOTE
Chronic medical problem.  New to examiner.  Patient is currently taking simvastatin 40 mg daily.  She would like a medication refill.  Last lab work:  Results for JORGE L PATRICK (MRN [id number]) as of 3/26/2020 13:50   Ref. Range 6/15/2019 09:07   Cholesterol,Tot Latest Ref Range: 100 - 199 mg/dL 168   Triglycerides Latest Ref Range: 0 - 149 mg/dL 113   HDL Latest Ref Range: >=40 mg/dL 51   LDL Latest Ref Range: <100 mg/dL 94

## 2020-03-26 NOTE — ASSESSMENT & PLAN NOTE
Chronic medical problem.  Due to new to examiner.  Patient reports that she needs a repeat EGD.  She would like a referral to [facility].

## 2020-03-26 NOTE — ASSESSMENT & PLAN NOTE
Chronic medical problem.  New to examiner.  Patient is currently taking Hyzaar 100-25 mg daily and amlodipine 5 mg daily.  She would like a refill on her medication.  She does check her blood pressure at home.  She gets readings of 130's/70-80's. Denies chest pain, palpations, shortness of breath, headaches, or dizziness.

## 2020-03-26 NOTE — PROGRESS NOTES
Subjective:     CC:  Diagnoses of Diabetes mellitus without complication (HCC), Dyslipidemia, Essential hypertension, Dalton's esophagus without dysplasia, Gastroesophageal reflux disease without esophagitis, Localized skin mass, lump, or swelling, Encounter to establish care, Migraine without status migrainosus, not intractable, unspecified migraine type, and Dyslipidemia were pertinent to this visit.    HISTORY OF THE PRESENT ILLNESS: Patient is a 64 y.o. female. This pleasant patient is here today to establish care and discuss the following. Her prior PCP was Dr. Giles.    Essential hypertension  Chronic medical problem.  New to examiner.  Patient is currently taking Hyzaar 100-25 mg daily and amlodipine 5 mg daily.  She would like a refill on her medication.  She does check her blood pressure at home.  She gets readings of 130's/70-80's. Denies chest pain, palpations, shortness of breath, headaches, or dizziness.     Localized skin mass, lump, or swelling  Chronic medical problem. New to examiner. Patient reports a localized lump to her mid chest that is been present for about 3 years.  It is painful to touch, but not painful at rest.  She has not noticed any drainage.  She reports her friend who is a nurse believes it was from an infected hair follicle.  Patient is interested in having this removed.    GERD (Gastroesophageal Reflux Disease)  Chronic medical problem.  New to examiner.  Patient will take omeprazole as needed.  No complaints today.    Dyslipidemia  Chronic medical problem.  New to examiner.  Patient is currently taking simvastatin 40 mg daily.  She would like a medication refill.  Last lab work:  Results for JORGE L PATRICK (MRN [id number]) as of 3/26/2020 13:50   Ref. Range 6/15/2019 09:07   Cholesterol,Tot Latest Ref Range: 100 - 199 mg/dL 168   Triglycerides Latest Ref Range: 0 - 149 mg/dL 113   HDL Latest Ref Range: >=40 mg/dL 51   LDL Latest Ref Range: <100 mg/dL 94       Diabetes mellitus  without complication (HCC)  Chronic medical problem.  New to examiner.  Patient is currently taking metformin 1000 mg twice daily, Lantus 30 units nightly, and glipizide 10 mg twice daily.  She was previously on Actos but had periods of hypoglycemia, she informed the diabetes RN of this and this was stopped.  She would like medication refills.  She does check her blood sugar every other day.  Today her blood sugar was 131.  She states her blood sugars are 90s to 130s.  She has having difficulty affording her Lantus.  She is interested in any help to afford this.    Dalton's esophagus  Chronic medical problem.  Due to new to examiner.  Patient reports that she needs a repeat EGD.  She would like a referral to [facility].    Migraine headache  Chronic medical problem.  New to examiner.  Patient reports that she gets migraines less frequently now.  No recent migraines.      Allergies: Patient has no known allergies.    Current Outpatient Medications Ordered in Epic   Medication Sig Dispense Refill   • simvastatin (ZOCOR) 40 MG Tab Take 1 Tab by mouth every evening. 90 Tab 3   • metformin (GLUCOPHAGE) 1000 MG tablet Take 1 Tab by mouth 2 times a day, with meals. 180 Tab 3   • losartan-hydrochlorothiazide (HYZAAR) 100-25 MG per tablet Take 1 Tab by mouth every day. 90 Tab 3   • insulin glargine (LANTUS SOLOSTAR) 100 UNIT/ML Solution Pen-injector injection INJECT 30 UNITS AS INSTRUCTED EVERY EVENING. 15 mL 3   • Insulin Pen Needle 1 Units by Does not apply route every day. 100 Each 3   • Lancets Lancets order: Lancets for insurance approved meter meter. Sig: use daily and prn ssx high or low sugar. #100 RF x 3 100 Each 1   • glucose blood strip USE DAILY AND AS NEEDED FOR HIGH OR LOW SUGAR. 100 Strip 3   • glipiZIDE (GLUCOTROL) 10 MG Tab Take 1 Tab by mouth 2 times a day. TAKE 1 TABLET BY MOUTH TWICE A  Tab 3   • amLODIPine (NORVASC) 5 MG Tab Take 1 Tab by mouth every day. 90 Tab 3   • naproxen  (NAPROSYN) 500 MG Tab TAKE 1 TAB BY MOUTH 2 TIMES DAILY WITH FOOD  0   • Blood Glucose Monitoring Suppl Device Meter: Dispense Device of Insurance Preference. Sig. Use as directed for blood sugar monitoring. #1. NR. 1 Device 0   • Blood Glucose Monitoring Suppl Supplies Misc Test strips order: Test strips for insurance approved meter. Sig: use daily and prn ssx high or low sugar #100 RF x 3 100 Units 1   • aspirin 81 MG tablet Take 81 mg by mouth every day.       No current [facility]-ordered facility-administered medications on file.        Past Medical History:   Diagnosis Date   • Allergic rhinitis    • Dalton's esophagus 2017   • Dental disorder     partial upper   • Diabetes mellitus without complication (HCC) 2015   • Dyslipidemia    • Essential hypertension 2015   • GERD (gastroesophageal reflux disease) 2009   • HTN    • Low back pain    • Migraine headache        Past Surgical History:   Procedure Laterality Date   • ROTATOR CUFF REPAIR Left 2017   • OREN BY LAPAROSCOPY Bilateral 8/3/2016    Procedure: OREN BY LAPAROSCOPY;  Surgeon: Scott Canales M.D.;  Location: SURGERY SAME DAY [facility] ORS;  Service:    • KNEE ARTHROSCOPY  2014    Performed by Wiley Kenyon M.D. at SURGERY [facility] ORS   • MEDIAL MENISCECTOMY  2014    Performed by Wiley Kenyon M.D. at SURGERY [facility] ORS   • SEPTOTURBINOPLASTY  3/30/2010    Performed by TRAV MENJIVAR at SURGERY SAME DAY [facility] ORS   • SIGMOIDOSCOPY FLEX  3/10    normal   • COLONOSCOPY  3/09    normal   • ANKLE ORIF     • APPENDECTOMY     • ABDOMINAL HYSTERECTOMY TOTAL      partial due to fibroids       Social History     Tobacco Use   • Smoking status: Former Smoker     Packs/day: 0.50     Years: 20.00     Pack years: 10.00     Types: Cigarettes     Last attempt to quit: 2006     Years since quittin.2   • Smokeless tobacco: Never Used   • Tobacco comment: 5 cig's a day for 20 yrs quit in    Substance Use  "Topics   • Alcohol use: Not Currently     Alcohol/week: 0.0 oz     Comment: 2 per month,  beer   • Drug use: No       Social History     Social History Narrative     at [employer]       Family History   Problem Relation Age of Onset   • Hypertension Mother    • Cancer Mother 85        colon   • Hypertension Father    • Diabetes Father    • Cancer Cousin    • No Known Problems Son    • No Known Problems Son        Health Maintenance: DM health maintenance labs ordered.    ROS:   Gen: no fevers/chills, no changes in weight  Eyes: no changes in vision  ENT: no sore throat, no ear pain, no congestion  Pulm: no sob, no cough  CV: no chest pain, no palpitations  GI: no nausea/vomiting, no diarrhea, no constipation  : no dysuria  MSk: no myalgias, no falls  Skin: no rash, no bruising,+ mid chest mass  Neuro: no headaches, no numbness/tingling, no dizziness  Heme/Lymph: no easy bruising      Objective:     Vital signs reviewed  Exam: /78   Pulse 80   Temp 36.3 °C (97.4 °F) (Temporal)   Resp 16   Ht 1.651 m (5' 5\")   Wt 73 kg (161 lb)   SpO2 95%  Body mass index is 26.79 kg/m².    General: Normal appearing. No distress.  HENT: Normocephalic. Ears normal shape and contour, canals are clear bilaterally, tympanic membranes are benign, nasal mucosa benign, oropharynx is without erythema, edema or exudates.   Eyes: Eyes conjunctiva clear lids without ptosis, pupils equal and reactive to light accommodation, lids normal.  Neck: Supple without JVD. Thyroid is not enlarged.  Pulmonary: Clear to ausculation.  Normal effort. No rales, ronchi, or wheezing.  Cardiovascular: Regular rate and rhythm without murmur. Radial pulses are intact and equal bilaterally.  Abdomen: Soft, nontender, nondistended. Normal bowel sounds. Liver and spleen are not palpable  Neurologic: Grossly nonfocal  Lymph: No cervical or supraclavicular lymph nodes are palpable  Skin: Warm and dry.  No obvious lesions.  Musculoskeletal: Normal gait. No " extremity cyanosis, clubbing, or edema.  Psych: Normal mood and affect. Alert and oriented x3. Judgment and insight is normal.    Assessment & Plan:   64 y.o. female with the following -    1. Diabetes mellitus without complication (HCC)  Chronic stable. New to me. Continue metformin, lantus 30 units, and glipizide. Medication and supply refill given. Referral to pharmacotherapy to see if they can assist with Lantus cost. Patient was previously seeing Dm RN. Continue blood sugar monitoring. Labs ordered. Monitor and follow.   - Comp Metabolic Panel; Future  - HEMOGLOBIN A1C; Future  - Lipid Profile; Future  - MICROALBUMIN CREAT RATIO URINE (LAB COLLECT); Future  - CBC WITH DIFFERENTIAL; Future  - REFERRAL TO PHARMACOTHERAPY SERVICE  - metformin (GLUCOPHAGE) 1000 MG tablet; Take 1 Tab by mouth 2 times a day, with meals.  Dispense: 180 Tab; Refill: 3  - insulin glargine (LANTUS SOLOSTAR) 100 UNIT/ML Solution Pen-injector injection; INJECT 30 UNITS AS INSTRUCTED EVERY EVENING.  Dispense: 15 mL; Refill: 3  - Insulin Pen Needle; 1 Units by Does not apply route every day.  Dispense: 100 Each; Refill: 3  - Lancets; Lancets order: Lancets for insurance approved meter meter. Sig: use daily and prn ssx high or low sugar. #100 RF x 3  Dispense: 100 Each; Refill: 1  - glucose blood strip; USE DAILY AND AS NEEDED FOR HIGH OR LOW SUGAR.  Dispense: 100 Strip; Refill: 3  - glipiZIDE (GLUCOTROL) 10 MG Tab; Take 1 Tab by mouth 2 times a day. TAKE 1 TABLET BY MOUTH TWICE A DAY  Dispense: 180 Tab; Refill: 3    2. Dyslipidemia  Chronic stable. New to me. Continue simvastatin, medication refilled.  Monitor and follow.  - simvastatin (ZOCOR) 40 MG Tab; Take 1 Tab by mouth every evening.  Dispense: 90 Tab; Refill: 3    3. Essential hypertension  Chronic stable.  New to me.  Continue losartan-hydrochlorothiazide and amlodipine.  Medication refilled. Initial /92, on repeat at end of appointment /78.  Monitor and follow.  -  losartan-hydrochlorothiazide (HYZAAR) 100-25 MG per tablet; Take 1 Tab by mouth every day.  Dispense: 90 Tab; Refill: 3  - amLODIPine (NORVASC) 5 MG Tab; Take 1 Tab by mouth every day.  Dispense: 90 Tab; Refill: 3    4. Dalton's esophagus without dysplasia  Chronic stable.  New to me.  Referral placed to gastroenterology for EGD.  Monitor.  - REFERRAL TO GASTROENTEROLOGY    5. Gastroesophageal reflux disease without esophagitis  Chronic stable.  New to me.  Continue omeprazole as needed.  Continue diet and lifestyle modifications.  Monitor and follow.    6. Localized skin mass, lump, or swelling  Chronic stable.  New to me.  Referral to dermatology.  Monitor.  - REFERRAL TO DERMATOLOGY    7. Migraine without status migrainosus, not intractable, unspecified migraine type  Chronic stable.  New to me.  Monitor.    8. Encounter to establish care  New issue to me.  Care established.  Patient due for diabetes health maintenance, labs ordered.  Monitor and follow.    Return in about 3 months (around 6/26/2020) for Diabetes.    Please note that this dictation was created using voice recognition software. I have made every reasonable attempt to correct obvious errors, but I expect that there are errors of grammar and possibly content that I did not discover before finalizing the note.

## 2020-03-26 NOTE — ASSESSMENT & PLAN NOTE
Chronic medical problem.  New to examiner.  Patient is currently taking metformin 1000 mg twice daily, Lantus 30 units nightly, and glipizide 10 mg twice daily.  She was previously on Actos but had periods of hypoglycemia, she informed the diabetes RN of this and this was stopped.  She would like medication refills.  She does check her blood sugar every other day.  Today her blood sugar was 131.  She states her blood sugars are 90s to 130s.  She has having difficulty affording her Lantus.  She is interested in any help to afford this. Last lab:  Results for JORGE L PATRICK (MRN [id number]) as of 3/26/2020 13:50   Ref. Range 9/27/2019 10:11   Glycohemoglobin Latest Ref Range: 0.0 - 5.6 % 8.4 (A)

## 2020-03-31 ENCOUNTER — NON-PROVIDER VISIT (OUTPATIENT)
Dept: MEDICAL GROUP | Facility: PHYSICIAN GROUP | Age: 65
End: 2020-03-31
Payer: COMMERCIAL

## 2020-03-31 DIAGNOSIS — E11.9 DIABETES MELLITUS WITHOUT COMPLICATION (HCC): ICD-10-CM

## 2020-03-31 PROCEDURE — 99403 PREV MED CNSL INDIV APPRX 45: CPT | Performed by: FAMILY MEDICINE

## 2020-03-31 RX ORDER — (INSULIN DEGLUDEC AND LIRAGLUTIDE) 100; 3.6 [IU]/ML; MG/ML
16 INJECTION, SOLUTION SUBCUTANEOUS DAILY
Qty: 15 ML | Refills: 3 | Status: SHIPPED | OUTPATIENT
Start: 2020-03-31 | End: 2021-01-11 | Stop reason: SDUPTHER

## 2020-03-31 NOTE — PROGRESS NOTES
"Initial Patient Consult Note    TIME IN: 2:31pm  TIME OUT: 3:23pm    Primary care physician: WILLIAM Velarde    Reason for consult: Management of Uncontrolled Type 2 Diabetes    HPI:  Elizabeth Blunt is a 64 y.o. old patient who comes in today for evaluation of above stated problem.    Most Recent HbA1c:   Lab Results   Component Value Date/Time    HBA1C 8.4 (A) 09/27/2019 10:11 AM      Lab Results   Component Value Date/Time    CREATININE 0.54 06/15/2019 09:07 AM    CREATININE 0.68 02/09/2011 08:05 AM              Diabetes Medication History and Current Regimen  Metformin: 1000mg BID     Sulfonylurea: 10mg BID      Basal Insulin: Lantus 30 units every evening      Pt has home glucometer and proper testing technique - YES    Pt reports blood sugars:   Before Breakfast: no log today, reports today was 123, mostly running 130's, high of 162  Before Lunch:  Before Dinner: not checking  Before Bedtime: not checking  Other times:    Hypoglycemia awareness - Yes  Nocturnal hypoglycemia- None  Hypoglycemia:  None since stopped pioglitazone in October      Current Exercise - None  Exercise Goal - Discussed the need for 150 minutes of moderate intensity exercise per week.  Discussed incrementally working up to this goal by adding short five to 10 minute walks throughout the week and slowly increasing duration and frequency.    Dietary -  also diabetic, through conversation, seems he is largest influence of dietary choices.  She is familiar with CHO and their role in diet and deleterious effects on diabetic states.  She is unable to commit to large changes to overall diet, but dose understand that portion control could help.    Pt reports eating:  Breakfast - scrabbled eggs, hashbrowns, beans  Snack - none  Lunch - \"meat\" of some sort, usually one to two corn tortillas  Snack - none  Dinner - lean meats such as chicken, fish (does not elaborate on how prepared), rice  Snack - none    Foot " Exam:  Monofilament exam - due 9/2020  Monofilament testing with a 10 gram force: sensation intact: decreased bilaterally.    Visual Inspection: Feet without maceration, ulcers, fissures.  Feet dry.  Pedal pulses: intact bilaterally    Preventative Management  BP regimen (ACE/ARB) - Losartan/HCTZ  ASA - 81mg daily  Statin - Simvastatin   Last Retinal Scan - 1/2020  Last Foot Exam - 9/2019  Last A1c - today  Last Microalbuminuria - 9/2019    updated caregaps    Past Medical History:  Patient Active Problem List    Diagnosis Date Noted   • Localized skin mass, lump, or swelling 03/26/2020   • Post-nasal drip 08/16/2019   • Dalton's esophagus 04/14/2017   • Diabetes mellitus without complication (HCC) 11/24/2015   • Essential hypertension 08/25/2015   • Tear of medial cartilage or meniscus of knee, current 02/13/2014   • Migraine headache 03/07/2012   • GERD (gastroesophageal reflux disease) 05/07/2009   • Dyslipidemia    • Allergic rhinitis        Past Surgical History:  Past Surgical History:   Procedure Laterality Date   • ROTATOR CUFF REPAIR Left 08/2017   • OREN BY LAPAROSCOPY Bilateral 8/3/2016    Procedure: OREN BY LAPAROSCOPY;  Surgeon: Scott Canales M.D.;  Location: SURGERY SAME DAY [facility] ORS;  Service:    • KNEE ARTHROSCOPY  2/13/2014    Performed by Wiley Kenyon M.D. at SURGERY [facility] ORS   • MEDIAL MENISCECTOMY  2/13/2014    Performed by Wiley Kenyon M.D. at SURGERY [facility] ORS   • SEPTOTURBINOPLASTY  3/30/2010    Performed by TRAV MENJIVAR at SURGERY SAME DAY [facility] ORS   • SIGMOIDOSCOPY FLEX  3/10    normal   • COLONOSCOPY  3/09    normal   • ANKLE ORIF  5/07   • APPENDECTOMY  2003   • ABDOMINAL HYSTERECTOMY TOTAL  2002    partial due to fibroids       Allergies:  Patient has no known allergies.    Social History:  Social History     Socioeconomic History   • Marital status:      Spouse name: Not on file   • Number of children: Not on file   • Years of education: Not on file   •  Highest education level: Not on file   Occupational History   • Occupation: Retired     Employer: [employer]   Social Needs   • Financial resource strain: Not on file   • Food insecurity     Worry: Not on file     Inability: Not on file   • Transportation needs     Medical: Not on file     Non-medical: Not on file   Tobacco Use   • Smoking status: Former Smoker     Packs/day: 0.50     Years: 20.00     Pack years: 10.00     Types: Cigarettes     Last attempt to quit: 2006     Years since quittin.2   • Smokeless tobacco: Never Used   • Tobacco comment: 5 cig's a day for 20 yrs quit in    Substance and Sexual Activity   • Alcohol use: Not Currently     Alcohol/week: 0.0 oz     Comment: 2 per month,  beer   • Drug use: No   • Sexual activity: Not Currently     Partners: Male     Birth control/protection: Surgical   Lifestyle   • Physical activity     Days per week: Not on file     Minutes per session: Not on file   • Stress: Not on file   Relationships   • Social connections     Talks on phone: Not on file     Gets together: Not on file     Attends Buddhist service: Not on file     Active member of club or organization: Not on file     Attends meetings of clubs or organizations: Not on file     Relationship status: Not on file   • Intimate partner violence     Fear of current or ex partner: Not on file     Emotionally abused: Not on file     Physically abused: Not on file     Forced sexual activity: Not on file   Other Topics Concern   • Not on file   Social History Narrative     at [employer]       Family History:  Family History   Problem Relation Age of Onset   • Hypertension Mother    • Cancer Mother 85        colon   • Hypertension Father    • Diabetes Father    • Cancer Cousin    • No Known Problems Son    • No Known Problems Son        Medications:    Current Outpatient Medications:   •  aspirin 81 MG tablet, Take 81 mg by mouth every day., Disp: , Rfl:   •  simvastatin (ZOCOR) 40 MG Tab,  Take 1 Tab by mouth every evening., Disp: 90 Tab, Rfl: 3  •  metformin (GLUCOPHAGE) 1000 MG tablet, Take 1 Tab by mouth 2 times a day, with meals., Disp: 180 Tab, Rfl: 3  •  losartan-hydrochlorothiazide (HYZAAR) 100-25 MG per tablet, Take 1 Tab by mouth every day., Disp: 90 Tab, Rfl: 3  •  insulin glargine (LANTUS SOLOSTAR) 100 UNIT/ML Solution Pen-injector injection, INJECT 30 UNITS AS INSTRUCTED EVERY EVENING., Disp: 15 mL, Rfl: 3  •  Insulin Pen Needle, 1 Units by Does not apply route every day., Disp: 100 Each, Rfl: 3  •  Lancets, Lancets order: Lancets for insurance approved meter meter. Sig: use daily and prn ssx high or low sugar. #100 RF x 3, Disp: 100 Each, Rfl: 1  •  glucose blood strip, USE DAILY AND AS NEEDED FOR HIGH OR LOW SUGAR., Disp: 100 Strip, Rfl: 3  •  glipiZIDE (GLUCOTROL) 10 MG Tab, Take 1 Tab by mouth 2 times a day. TAKE 1 TABLET BY MOUTH TWICE A DAY, Disp: 180 Tab, Rfl: 3  •  amLODIPine (NORVASC) 5 MG Tab, Take 1 Tab by mouth every day., Disp: 90 Tab, Rfl: 3  •  naproxen (NAPROSYN) 500 MG Tab, TAKE 1 TAB BY MOUTH 2 TIMES DAILY WITH FOOD, Disp: , Rfl: 0  •  Blood Glucose Monitoring Suppl Device, Meter: Dispense Device of Insurance Preference. Sig. Use as directed for blood sugar monitoring. #1. NR., Disp: 1 Device, Rfl: 0  •  Blood Glucose Monitoring Suppl Supplies Misc, Test strips order: Test strips for insurance approved meter. Sig: use daily and prn ssx high or low sugar #100 RF x 3, Disp: 100 Units, Rfl: 1    Labs: Reviewed    Physical Examination:  Vital signs: LMP 12/05/2002  There is no height or weight on file to calculate BMI.    Assessment and Plan:    1. DM2  Patient seen today for initial visit for diabetic management.  Currently optimized on metformin.  Optimized on glipizide.  Taking Lantus 30 units every evening.  Long standing hx of diabetes,  also diabetic.  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  Has tried pioglitazone in  the past, states she saw bout of hypoglycemia (FBG of 40-50) within several days, stopped shortly thereafter.  Little to no exercise or activity on daily basis.  Heavily  influenced diet containing beans, rice, tortillas.  Has received information on lifestyle modifications for diabetics in the past, has not used.  Discussed at length that medication interventions can only work so much, will need solid exercise and lifestyle modifications to achieve goals.    - Medication changes - Stop Lantus, stop glipizide  Start Xultophy 100/3.6mg 16 units every evening, titrate by 2 units every three to four days to target FBG of 80 to 100.  Discussed addition of SGLT2, such as Jardiance, but with current financial barriers, she would like to work on one medication at a time, which is a reasonable request.    - Lifestyle changes - Strongly urged patient to develop a regular exercise plan.  She is able to commit to small change by walking with friend around their neighborhood.  Asked that she really focus on achieving overall goal of 150 minutes per week by slowly increasing duration and frequency.  This will be discussed again at next visit.    Patient has received multiple sources of dietary recommendations for diabetics, does not seem apt to change overall eating habits.  Explained at length the role of simple CHO in diabetic state and how portion control and decrease in CHO is needed for more optimal control of diabetic state.  She seems more willing to focus on exercise at this point, but will begin to substitute parts of diet with complex CHO such as fruits and vegetables, but unwilling to commit to large changes.  Will follow up on this at subsequent visits.    Follow up:  Three weeks.    Sriram Zamorano, PharmD  03/31/20    CC:   WILLIAM Velarde M.D.

## 2020-04-21 ENCOUNTER — NON-PROVIDER VISIT (OUTPATIENT)
Dept: MEDICAL GROUP | Facility: PHYSICIAN GROUP | Age: 65
End: 2020-04-21
Payer: COMMERCIAL

## 2020-04-21 PROCEDURE — 99401 PREV MED CNSL INDIV APPRX 15: CPT | Performed by: FAMILY MEDICINE

## 2020-04-21 NOTE — PROGRESS NOTES
"Patient Consult Note    TIME IN: 11:02  TIME OUT: 11:21    Primary care physician: WILLIAM Velarde    Reason for consult: Management of Uncontrolled Type 2 Diabetes    HPI:  Elizabeth Blunt is a 64 y.o. old patient who comes in today for evaluation of above stated problem.    Most Recent HbA1c:   Lab Results   Component Value Date/Time    HBA1C 8.4 (A) 09/27/2019 10:11 AM      Lab Results   Component Value Date/Time    CREATININE 0.54 06/15/2019 09:07 AM    CREATININE 0.68 02/09/2011 08:05 AM              Diabetes Medication History and Current Regimen  Metformin 1000mg BID        Sulfonylurea: Glipizide 10mg BID      Basal Insulin: Lantus 30 units every evening - Did not start Xultophy yet as she was using remainder of Lantus.      Pt has home glucometer and proper testing technique - YES    Pt reports blood sugars:   Before Breakfast: 118, 108, 150, 105, 162 (she did not bring log, but did remember last several days of readings.  Before Lunch:  Before Dinner:  Before Bedtime:  Other times:    Hypoglycemia awareness - Yes  Nocturnal hypoglycemia- None  Hypoglycemia:  None since stopped pioglitazone in October        Current Exercise - None  Exercise Goal - Discussed the need for 150 minutes of moderate intensity exercise per week.  Discussed incrementally working up to this goal by adding short five to 10 minute walks throughout the week and slowly increasing duration and frequency.     Dietary -  also diabetic, through conversation, seems he is largest influence of dietary choices.  She is familiar with CHO and their role in diet and deleterious effects on diabetic states.  She is unable to commit to large changes to overall diet, but dose understand that portion control could help.     Pt reports eating:  Breakfast - scrabbled eggs, hashbrowns, beans  Snack - none  Lunch - \"meat\" of some sort, usually one to two corn tortillas  Snack - none  Dinner - lean meats such as chicken, fish (does not " elaborate on how prepared), rice  Snack - none     Foot Exam:  Monofilament exam - due 9/2020  Monofilament testing with a 10 gram force: sensation intact: decreased bilaterally.    Visual Inspection: Feet without maceration, ulcers, fissures.  Feet dry.  Pedal pulses: intact bilaterally     Preventative Management  BP regimen (ACE/ARB) - Losartan/HCTZ  ASA - 81mg daily  Statin - Simvastatin   Last Retinal Scan - 1/2020  Last Foot Exam - 9/2019  Last A1c - today  Last Microalbuminuria - 9/2019     updated caregaps    Past Medical History:  Patient Active Problem List    Diagnosis Date Noted   • Localized skin mass, lump, or swelling 03/26/2020   • Post-nasal drip 08/16/2019   • Dalton's esophagus 04/14/2017   • Diabetes mellitus without complication (HCC) 11/24/2015   • Essential hypertension 08/25/2015   • Tear of medial cartilage or meniscus of knee, current 02/13/2014   • Migraine headache 03/07/2012   • GERD (gastroesophageal reflux disease) 05/07/2009   • Dyslipidemia    • Allergic rhinitis        Past Surgical History:  Past Surgical History:   Procedure Laterality Date   • ROTATOR CUFF REPAIR Left 08/2017   • OREN BY LAPAROSCOPY Bilateral 8/3/2016    Procedure: OREN BY LAPAROSCOPY;  Surgeon: Scott Canales M.D.;  Location: SURGERY SAME DAY [facility];  Service:    • KNEE ARTHROSCOPY  2/13/2014    Performed by Wiley Kenyon M.D. at SURGERY [facility]   • MEDIAL MENISCECTOMY  2/13/2014    Performed by Wiley Kenyon M.D. at SURGERY [facility]   • SEPTOTURBINOPLASTY  3/30/2010    Performed by TRAV MENJIVAR at SURGERY SAME DAY [facility] ORS   • SIGMOIDOSCOPY FLEX  3/10    normal   • COLONOSCOPY  3/09    normal   • ANKLE ORIF  5/07   • APPENDECTOMY  2003   • ABDOMINAL HYSTERECTOMY TOTAL  2002    partial due to fibroids       Allergies:  Patient has no known allergies.    Social History:  Social History     Socioeconomic History   • Marital status:      Spouse name: Not on file   • Number of  children: Not on file   • Years of education: Not on file   • Highest education level: Not on file   Occupational History   • Occupation: Retired     Employer: [employer]   Social Needs   • Financial resource strain: Not on file   • Food insecurity     Worry: Not on file     Inability: Not on file   • Transportation needs     Medical: Not on file     Non-medical: Not on file   Tobacco Use   • Smoking status: Former Smoker     Packs/day: 0.50     Years: 20.00     Pack years: 10.00     Types: Cigarettes     Last attempt to quit: 2006     Years since quittin.3   • Smokeless tobacco: Never Used   • Tobacco comment: 5 cig's a day for 20 yrs quit in    Substance and Sexual Activity   • Alcohol use: Not Currently     Alcohol/week: 0.0 oz     Comment: 2 per month,  beer   • Drug use: No   • Sexual activity: Not Currently     Partners: Male     Birth control/protection: Surgical   Lifestyle   • Physical activity     Days per week: Not on file     Minutes per session: Not on file   • Stress: Not on file   Relationships   • Social connections     Talks on phone: Not on file     Gets together: Not on file     Attends [employer]ism service: Not on file     Active member of club or organization: Not on file     Attends meetings of clubs or organizations: Not on file     Relationship status: Not on file   • Intimate partner violence     Fear of current or ex partner: Not on file     Emotionally abused: Not on file     Physically abused: Not on file     Forced sexual activity: Not on file   Other Topics Concern   • Not on file   Social History Narrative     at [employer]       Family History:  Family History   Problem Relation Age of Onset   • Hypertension Mother    • Cancer Mother 85        colon   • Hypertension Father    • Diabetes Father    • Cancer Cousin    • No Known Problems Son    • No Known Problems Son        Medications:    Current Outpatient Medications:   •  Insulin Degludec-Liraglutide (XULTOPHY)  100-3.6 UNIT-MG/ML Solution Pen-injector, Inject 16 Units as instructed every day. Titrate by 2 units as needed and as directed by provider, Disp: 15 mL, Rfl: 3  •  aspirin 81 MG tablet, Take 81 mg by mouth every day., Disp: , Rfl:   •  simvastatin (ZOCOR) 40 MG Tab, Take 1 Tab by mouth every evening., Disp: 90 Tab, Rfl: 3  •  metformin (GLUCOPHAGE) 1000 MG tablet, Take 1 Tab by mouth 2 times a day, with meals., Disp: 180 Tab, Rfl: 3  •  losartan-hydrochlorothiazide (HYZAAR) 100-25 MG per tablet, Take 1 Tab by mouth every day., Disp: 90 Tab, Rfl: 3  •  Insulin Pen Needle, 1 Units by Does not apply route every day., Disp: 100 Each, Rfl: 3  •  Lancets, Lancets order: Lancets for insurance approved meter meter. Sig: use daily and prn ssx high or low sugar. #100 RF x 3, Disp: 100 Each, Rfl: 1  •  glucose blood strip, USE DAILY AND AS NEEDED FOR HIGH OR LOW SUGAR., Disp: 100 Strip, Rfl: 3  •  glipiZIDE (GLUCOTROL) 10 MG Tab, Take 1 Tab by mouth 2 times a day. TAKE 1 TABLET BY MOUTH TWICE A DAY, Disp: 180 Tab, Rfl: 3  •  amLODIPine (NORVASC) 5 MG Tab, Take 1 Tab by mouth every day., Disp: 90 Tab, Rfl: 3  •  naproxen (NAPROSYN) 500 MG Tab, TAKE 1 TAB BY MOUTH 2 TIMES DAILY WITH FOOD, Disp: , Rfl: 0  •  Blood Glucose Monitoring Suppl Device, Meter: Dispense Device of Insurance Preference. Sig. Use as directed for blood sugar monitoring. #1. NR., Disp: 1 Device, Rfl: 0  •  Blood Glucose Monitoring Suppl Supplies Misc, Test strips order: Test strips for insurance approved meter. Sig: use daily and prn ssx high or low sugar #100 RF x 3, Disp: 100 Units, Rfl: 1    Labs: Reviewed    Physical Examination:  Vital signs: LMP 12/05/2002  There is no height or weight on file to calculate BMI.    Assessment and Plan:    1. DM2  Optimized on metformin.  Continues glipizide.  Last dose of Lantus was last night.  Has not started Xultophy.  No drastic changes to exercise or activity, discussed again as below.  Diet remains  "consistent.  Discussed addition of SGLT2, but will wait for several weeks of dosing of Xultophy to initiate to better recognize potential FAMILIA's.    - Medication changes -   Begin Xultophy 16 units tonight.  Increase by 2 units every three days to goal of FBG .  Decrease glipizide 10mg to QD starting tomorrow.  Continue Metformin as prescribed.   - Lifestyle changes -   Again stressed importance of exercise and activity level.  Patient reluctant to commit, but does state she has been doing more \"around the house\".  Will continue to revisit and educate importance of regular exercise routine.  Diet - continue to recognize influence of simple CHO on diabetic state.  Continue to work on portion control as well.      Follow up:  Two weeks (phone visit)    Sriram Zamorano, PharmD  04/21/20    CC:   WILLIAM Velarde      "

## 2020-05-05 ENCOUNTER — TELEPHONE (OUTPATIENT)
Dept: VASCULAR LAB | Facility: MEDICAL CENTER | Age: 65
End: 2020-05-05

## 2020-05-05 ENCOUNTER — ANTICOAGULATION MONITORING (OUTPATIENT)
Dept: MEDICAL GROUP | Facility: PHYSICIAN GROUP | Age: 65
End: 2020-05-05
Payer: COMMERCIAL

## 2020-05-05 DIAGNOSIS — E11.9 DIABETES MELLITUS WITHOUT COMPLICATION (HCC): ICD-10-CM

## 2020-05-05 DIAGNOSIS — K22.70 BARRETT'S ESOPHAGUS WITHOUT DYSPLASIA: ICD-10-CM

## 2020-05-05 PROCEDURE — 98967 PH1 ASSMT&MGMT NQHP 11-20: CPT | Mod: 95,CR | Performed by: PHARMACIST

## 2020-05-05 RX ORDER — OMEPRAZOLE 20 MG/1
20 CAPSULE, DELAYED RELEASE ORAL DAILY
Qty: 30 CAP | Refills: 1 | Status: SHIPPED | OUTPATIENT
Start: 2020-05-05 | End: 2020-05-27

## 2020-05-05 RX ORDER — EMPAGLIFLOZIN AND METFORMIN HYDROCHLORIDE 5; 1000 MG/1; MG/1
1 TABLET ORAL 2 TIMES DAILY
Qty: 60 TAB | Refills: 3 | Status: SHIPPED | OUTPATIENT
Start: 2020-05-05 | End: 2020-10-14 | Stop reason: SDUPTHER

## 2020-05-05 NOTE — PROGRESS NOTES
"Patient Consult Note    THIS VISIT CONDUCTED OVER THE PHONE DUE TO CONCERNS OF COVID-19 EXPOSURE.    Start of call: 11:01am  End of call: 11:17am    Primary care physician: WILLIAM Velarde    Reason for consult: Management of Uncontrolled Type 2 Diabetes    HPI:  Elizabeth Blunt is a 64 y.o. old patient who comes in today for evaluation of above stated problem.    Most Recent HbA1c:   Lab Results   Component Value Date/Time    HBA1C 8.4 (A) 09/27/2019 10:11 AM      Lab Results   Component Value Date/Time    CREATININE 0.54 06/15/2019 09:07 AM    CREATININE 0.68 02/09/2011 08:05 AM              Diabetes Medication History and Current Regimen  Metformin 1000mg BID   GLP-1 Agent:  Xultophy (up to 24 units QHS at this point)  Sulfonylurea: Glipizide 10mg QD    Basal Insulin: Xultophy as above    Pt has home glucometer and proper testing technique - YES    Pt reports blood sugars:   Before Breakfast: 16 units Xultophy: 136, 141, 120, 123          20 units:  138, 122, 138          22 units:  114, 114, 145          24 units:  113  Before Lunch:  Before Dinner:  Before Bedtime:  Other times:    Hypoglycemia awareness - Yes  Nocturnal hypoglycemia- None  Hypoglycemia:  None    Current Exercise - None  Exercise Goal - Discussed the need for 150 minutes of moderate intensity exercise per week.  Discussed incrementally working up to this goal by adding short five to 10 minute walks throughout the week and slowly increasing duration and frequency.     Dietary -  also diabetic, through conversation, seems he is largest influence of dietary choices.  She is familiar with CHO and their role in diet and deleterious effects on diabetic states.  She is unable to commit to large changes to overall diet, but dose understand that portion control could help.     Pt reports eating:  Breakfast - scrabbled eggs, hashbrowns, beans  Snack - none  Lunch - \"meat\" of some sort, usually one to two corn tortillas  Snack " - none  Dinner - lean meats such as chicken, fish (does not elaborate on how prepared), rice  Snack - none     Foot Exam:  Monofilament exam - due 9/2020  Monofilament testing with a 10 gram force: sensation intact: decreased bilaterally.    Visual Inspection: Feet without maceration, ulcers, fissures.  Feet dry.  Pedal pulses: intact bilaterally     Preventative Management  BP regimen (ACE/ARB) - Losartan/HCTZ  ASA - 81mg daily  Statin - Simvastatin   Last Retinal Scan - 1/2020  Last Foot Exam - 9/2019  Last A1c - today  Last Microalbuminuria - 9/2019     updated caregaps  Past Medical History:  Patient Active Problem List    Diagnosis Date Noted   • Localized skin mass, lump, or swelling 03/26/2020   • Post-nasal drip 08/16/2019   • Dalton's esophagus 04/14/2017   • Diabetes mellitus without complication (HCC) 11/24/2015   • Essential hypertension 08/25/2015   • Tear of medial cartilage or meniscus of knee, current 02/13/2014   • Migraine headache 03/07/2012   • GERD (gastroesophageal reflux disease) 05/07/2009   • Dyslipidemia    • Allergic rhinitis        Past Surgical History:  Past Surgical History:   Procedure Laterality Date   • ROTATOR CUFF REPAIR Left 08/2017   • OREN BY LAPAROSCOPY Bilateral 8/3/2016    Procedure: OREN BY LAPAROSCOPY;  Surgeon: Scott Canales M.D.;  Location: SURGERY SAME DAY [facility];  Service:    • KNEE ARTHROSCOPY  2/13/2014    Performed by Wiley Kenyon M.D. at SURGERY [facility]   • MEDIAL MENISCECTOMY  2/13/2014    Performed by Wiley Kenyon M.D. at [facility]   • SEPTOTURBINOPLASTY  3/30/2010    Performed by TRAV MENJIVAR at SURGERY SAME DAY [facility]   • SIGMOIDOSCOPY FLEX  3/10    normal   • COLONOSCOPY  3/09    normal   • ANKLE ORIF  5/07   • APPENDECTOMY  2003   • ABDOMINAL HYSTERECTOMY TOTAL  2002    partial due to fibroids       Allergies:  Patient has no known allergies.    Social History:  Social History     Socioeconomic History   • Marital  status:      Spouse name: Not on file   • Number of children: Not on file   • Years of education: Not on file   • Highest education level: Not on file   Occupational History   • Occupation: Retired     Employer: [employer]   Social Needs   • Financial resource strain: Not on file   • Food insecurity     Worry: Not on file     Inability: Not on file   • Transportation needs     Medical: Not on file     Non-medical: Not on file   Tobacco Use   • Smoking status: Former Smoker     Packs/day: 0.50     Years: 20.00     Pack years: 10.00     Types: Cigarettes     Last attempt to quit: 2006     Years since quittin.3   • Smokeless tobacco: Never Used   • Tobacco comment: 5 cig's a day for 20 yrs quit in    Substance and Sexual Activity   • Alcohol use: Not Currently     Alcohol/week: 0.0 oz     Comment: 2 per month,  beer   • Drug use: No   • Sexual activity: Not Currently     Partners: Male     Birth control/protection: Surgical   Lifestyle   • Physical activity     Days per week: Not on file     Minutes per session: Not on file   • Stress: Not on file   Relationships   • Social connections     Talks on phone: Not on file     Gets together: Not on file     Attends Jew service: Not on file     Active member of club or organization: Not on file     Attends meetings of clubs or organizations: Not on file     Relationship status: Not on file   • Intimate partner violence     Fear of current or ex partner: Not on file     Emotionally abused: Not on file     Physically abused: Not on file     Forced sexual activity: Not on file   Other Topics Concern   • Not on file   Social History Narrative     at [employer]       Family History:  Family History   Problem Relation Age of Onset   • Hypertension Mother    • Cancer Mother 85        colon   • Hypertension Father    • Diabetes Father    • Cancer Cousin    • No Known Problems Son    • No Known Problems Son        Medications:    Current Outpatient  Medications:   •  omeprazole (PRILOSEC) 20 MG delayed-release capsule, Take 1 Cap by mouth every day., Disp: 30 Cap, Rfl: 1  •  Empagliflozin-metFORMIN HCl (SYNJARDY) 5-1000 MG Tab, Take 1 tablet by mouth 2 Times a Day., Disp: 60 Tab, Rfl: 3  •  Insulin Degludec-Liraglutide (XULTOPHY) 100-3.6 UNIT-MG/ML Solution Pen-injector, Inject 16 Units as instructed every day. Titrate by 2 units as needed and as directed by provider, Disp: 15 mL, Rfl: 3  •  aspirin 81 MG tablet, Take 81 mg by mouth every day., Disp: , Rfl:   •  simvastatin (ZOCOR) 40 MG Tab, Take 1 Tab by mouth every evening., Disp: 90 Tab, Rfl: 3  •  losartan-hydrochlorothiazide (HYZAAR) 100-25 MG per tablet, Take 1 Tab by mouth every day., Disp: 90 Tab, Rfl: 3  •  Insulin Pen Needle, 1 Units by Does not apply route every day., Disp: 100 Each, Rfl: 3  •  Lancets, Lancets order: Lancets for insurance approved meter meter. Sig: use daily and prn ssx high or low sugar. #100 RF x 3, Disp: 100 Each, Rfl: 1  •  glucose blood strip, USE DAILY AND AS NEEDED FOR HIGH OR LOW SUGAR., Disp: 100 Strip, Rfl: 3  •  glipiZIDE (GLUCOTROL) 10 MG Tab, Take 1 Tab by mouth 2 times a day. TAKE 1 TABLET BY MOUTH TWICE A DAY, Disp: 180 Tab, Rfl: 3  •  amLODIPine (NORVASC) 5 MG Tab, Take 1 Tab by mouth every day., Disp: 90 Tab, Rfl: 3  •  naproxen (NAPROSYN) 500 MG Tab, TAKE 1 TAB BY MOUTH 2 TIMES DAILY WITH FOOD, Disp: , Rfl: 0  •  Blood Glucose Monitoring Suppl Device, Meter: Dispense Device of Insurance Preference. Sig. Use as directed for blood sugar monitoring. #1. NR., Disp: 1 Device, Rfl: 0  •  Blood Glucose Monitoring Suppl Supplies Misc, Test strips order: Test strips for insurance approved meter. Sig: use daily and prn ssx high or low sugar #100 RF x 3, Disp: 100 Units, Rfl: 1    Labs: Reviewed    Physical Examination:  Vital signs: LMP 12/05/2002  There is no height or weight on file to calculate BMI.    Assessment and Plan:    1. DM2  Tolerating Xultophy, continues to  increase by 2 units every three days.  Optimized on metformin.  Decreased glipizide dose as instructed.  Patient has several migraines per month that include vomiting.  She states that several times she has notice her metformin tablets from the previous day in her vomit.  She is quite worried to restart taking it at this point.  Discussed the need for additional antidiabetic medications, will make adjustments as below    - Medication changes -   Begin Synjardy 5/1000mg BID  Continue Xultophy, increase by 2 units every three days to FBG of .  Stop metformin, Stop Glipizide   - Lifestyle changes -   Continue to focus on healthy diet choices.  Patient has not increased exercise much at this point, will continue to reiterate and challenge at each follow up.    Follow up:  One week by phone    Sriram Zamorano, PharmD  05/05/20    CC:   WILLIAM Velarde

## 2020-05-27 DIAGNOSIS — K22.70 BARRETT'S ESOPHAGUS WITHOUT DYSPLASIA: ICD-10-CM

## 2020-05-27 RX ORDER — OMEPRAZOLE 20 MG/1
CAPSULE, DELAYED RELEASE ORAL
Qty: 30 CAP | Refills: 1 | Status: SHIPPED | OUTPATIENT
Start: 2020-05-27 | End: 2020-07-06 | Stop reason: SDUPTHER

## 2020-05-27 NOTE — TELEPHONE ENCOUNTER
Requested Prescriptions     Signed Prescriptions Disp Refills   • omeprazole (PRILOSEC) 20 MG delayed-release capsule 30 Cap 1     Sig: TAKE 1 CAPSULE BY MOUTH EVERY DAY     Authorizing Provider: JESSIE HOOD A.P.R.N.

## 2020-06-01 ENCOUNTER — NON-PROVIDER VISIT (OUTPATIENT)
Dept: MEDICAL GROUP | Facility: PHYSICIAN GROUP | Age: 65
End: 2020-06-01
Payer: COMMERCIAL

## 2020-06-01 DIAGNOSIS — I10 ESSENTIAL HYPERTENSION: ICD-10-CM

## 2020-06-01 DIAGNOSIS — E11.9 DIABETES MELLITUS WITHOUT COMPLICATION (HCC): Primary | ICD-10-CM

## 2020-06-01 PROCEDURE — 99442 PR PHYSICIAN TELEPHONE EVALUATION 11-20 MIN: CPT | Mod: CR | Performed by: NURSE PRACTITIONER

## 2020-06-01 RX ORDER — LOSARTAN POTASSIUM 100 MG/1
100 TABLET ORAL DAILY
Qty: 90 TAB | Refills: 1 | Status: SHIPPED | OUTPATIENT
Start: 2020-06-01 | End: 2020-11-09 | Stop reason: SDUPTHER

## 2020-06-01 NOTE — PROGRESS NOTES
Telephone Appointment Visit   As a means of avoiding spread of COVID-19, this visit is being conducted by telephone. This telephone visit was initiated by the patient and they verbally consented.    Time at start of call: 2:24pm    Reason for Call:  Management of Uncontrolled Type 2 Diabetes    HPI:     Elizabeth Blunt is a 65 y.o. old patient who comes in today for evaluation of above stated problem.    Most Recent HbA1c:   Lab Results   Component Value Date/Time    HBA1C 8.4 (A) 09/27/2019 10:11 AM      Lab Results   Component Value Date/Time    CREATININE 0.54 06/15/2019 09:07 AM    CREATININE 0.68 02/09/2011 08:05 AM              Diabetes Medication History and Current Regimen  Metformin/SGLT-2:  Synjardy 5/1000mg BID   GLP-1 Agent/Basal Insulin:  Xultophy 100/3.6mg/mL 30 units every evening      Pt has home glucometer and proper testing technique - YES    Pt reports blood sugars:   Before Breakfast:  106, 101, 116, 104, 107, 103, 100, 107, 99, 118, 101  Before Lunch:  Before Dinner:  Before Bedtime:  Other times:    Hypoglycemia awareness - Yes  Nocturnal hypoglycemia- None  Hypoglycemia:  None    Current Exercise - None  Exercise Goal - Discussed the need for 150 minutes of moderate intensity exercise per week.  Discussed incrementally working up to this goal by adding short five to 10 minute walks throughout the week and slowly increasing duration and frequency.     Dietary -  also diabetic, through conversation, seems he is largest influence of dietary choices.  She is familiar with CHO and their role in diet and deleterious effects on diabetic states.  She is unable to commit to large changes to overall diet, but dose understand that portion control could help.    Preventative Management  BP regimen (ACE/ARB) - Losartan/HCTZ  ASA - 81mg daily  Statin - Simvastatin   Last Retinal Scan - 1/2020  Last Foot Exam - 9/2019  Last A1c - today  Last Microalbuminuria - 9/2019      Labs / Images Reviewed:      Lab Results   Component Value Date    CHOLSTRLTOT 168 06/15/2019    LDL 94 06/15/2019    HDL 51 06/15/2019    TRIGLYCERIDE 113 06/15/2019          Lab Results   Component Value Date    HBA1C 8.4 (A) 09/27/2019      Lab Results   Component Value Date    SODIUM 137 06/15/2019    POTASSIUM 4.6 06/15/2019    CHLORIDE 103 06/15/2019    CO2 27 06/15/2019    GLUCOSE 249 (H) 06/15/2019    BUN 12 06/15/2019    CREATININE 0.54 06/15/2019     Lab Results   Component Value Date    WBC 7.5 06/15/2019    RBC 5.25 06/15/2019    HEMOGLOBIN 15.2 06/15/2019    HEMATOCRIT 47.7 (H) 06/15/2019    MCV 90.9 06/15/2019    MCH 29.0 06/15/2019    MCHC 31.9 (L) 06/15/2019    MPV 10.3 06/15/2019           Assessment and Plan:     1. DM2  Optimized on Synjardy.  Optimized and tolerating current dosing of Xultophy.  FBG have improved considerably since start of current regimen.  Patient c/o increase in HA in the morning hours.  She has cut dose of Synjardy in half (2.5/500mg BID) without significant change to FBG.  Stressed importance of adequate hydration with Synjardy.  Will discontinue current diuretic, HCTZ, which may be contributing to dehydration, hypotension, and HA.    - Medication changes -   Continue with current diabetic medications.  Discontinue HCTZ, will now take only losartan, not combination product.   - Lifestyle changes -   Continue to focus portion control an recognizing benefit of diabetic friendly foods.  Exercise continues to be a struggle for patient.  Again, challenged her to increase frequency or duration of current walking regimen to a goal of 150 minutes moderate intensity exercise per week.  She is noncomittal, but voices understanding of need for more physical activity.      Follow-up: Four weeks    Time at end of call: 2:36PM  Total Time Spent: 11-20 minutes    Sriram Zaomrano, PharmD, BCACP    CC Mamie RENTERIA

## 2020-06-11 ENCOUNTER — HOSPITAL ENCOUNTER (OUTPATIENT)
Dept: LAB | Facility: MEDICAL CENTER | Age: 65
End: 2020-06-11
Attending: NURSE PRACTITIONER
Payer: COMMERCIAL

## 2020-06-11 DIAGNOSIS — E11.9 DIABETES MELLITUS WITHOUT COMPLICATION (HCC): ICD-10-CM

## 2020-06-11 LAB
ALBUMIN SERPL BCP-MCNC: 4.3 G/DL (ref 3.2–4.9)
ALBUMIN/GLOB SERPL: 1.4 G/DL
ALP SERPL-CCNC: 128 U/L (ref 30–99)
ALT SERPL-CCNC: 34 U/L (ref 2–50)
ANION GAP SERPL CALC-SCNC: 13 MMOL/L (ref 7–16)
AST SERPL-CCNC: 31 U/L (ref 12–45)
BASOPHILS # BLD AUTO: 0.4 % (ref 0–1.8)
BASOPHILS # BLD: 0.03 K/UL (ref 0–0.12)
BILIRUB SERPL-MCNC: 0.7 MG/DL (ref 0.1–1.5)
BUN SERPL-MCNC: 12 MG/DL (ref 8–22)
CALCIUM SERPL-MCNC: 9.1 MG/DL (ref 8.5–10.5)
CHLORIDE SERPL-SCNC: 104 MMOL/L (ref 96–112)
CHOLEST SERPL-MCNC: 156 MG/DL (ref 100–199)
CO2 SERPL-SCNC: 24 MMOL/L (ref 20–33)
CREAT SERPL-MCNC: 0.52 MG/DL (ref 0.5–1.4)
CREAT UR-MCNC: 158.86 MG/DL
EOSINOPHIL # BLD AUTO: 0.1 K/UL (ref 0–0.51)
EOSINOPHIL NFR BLD: 1.2 % (ref 0–6.9)
ERYTHROCYTE [DISTWIDTH] IN BLOOD BY AUTOMATED COUNT: 43.4 FL (ref 35.9–50)
EST. AVERAGE GLUCOSE BLD GHB EST-MCNC: 151 MG/DL
FASTING STATUS PATIENT QL REPORTED: NORMAL
GLOBULIN SER CALC-MCNC: 3.1 G/DL (ref 1.9–3.5)
GLUCOSE SERPL-MCNC: 127 MG/DL (ref 65–99)
HBA1C MFR BLD: 6.9 % (ref 0–5.6)
HCT VFR BLD AUTO: 49.8 % (ref 37–47)
HDLC SERPL-MCNC: 45 MG/DL
HGB BLD-MCNC: 16.4 G/DL (ref 12–16)
IMM GRANULOCYTES # BLD AUTO: 0.02 K/UL (ref 0–0.11)
IMM GRANULOCYTES NFR BLD AUTO: 0.2 % (ref 0–0.9)
LDLC SERPL CALC-MCNC: 85 MG/DL
LYMPHOCYTES # BLD AUTO: 1.67 K/UL (ref 1–4.8)
LYMPHOCYTES NFR BLD: 20.3 % (ref 22–41)
MCH RBC QN AUTO: 29 PG (ref 27–33)
MCHC RBC AUTO-ENTMCNC: 32.9 G/DL (ref 33.6–35)
MCV RBC AUTO: 88.1 FL (ref 81.4–97.8)
MICROALBUMIN UR-MCNC: 2.5 MG/DL
MICROALBUMIN/CREAT UR: 16 MG/G (ref 0–30)
MONOCYTES # BLD AUTO: 0.79 K/UL (ref 0–0.85)
MONOCYTES NFR BLD AUTO: 9.6 % (ref 0–13.4)
NEUTROPHILS # BLD AUTO: 5.6 K/UL (ref 2–7.15)
NEUTROPHILS NFR BLD: 68.3 % (ref 44–72)
NRBC # BLD AUTO: 0 K/UL
NRBC BLD-RTO: 0 /100 WBC
PLATELET # BLD AUTO: 326 K/UL (ref 164–446)
PMV BLD AUTO: 10 FL (ref 9–12.9)
POTASSIUM SERPL-SCNC: 5.1 MMOL/L (ref 3.6–5.5)
PROT SERPL-MCNC: 7.4 G/DL (ref 6–8.2)
RBC # BLD AUTO: 5.65 M/UL (ref 4.2–5.4)
SODIUM SERPL-SCNC: 141 MMOL/L (ref 135–145)
TRIGL SERPL-MCNC: 128 MG/DL (ref 0–149)
WBC # BLD AUTO: 8.2 K/UL (ref 4.8–10.8)

## 2020-06-11 PROCEDURE — 80053 COMPREHEN METABOLIC PANEL: CPT

## 2020-06-11 PROCEDURE — 80061 LIPID PANEL: CPT

## 2020-06-11 PROCEDURE — 83036 HEMOGLOBIN GLYCOSYLATED A1C: CPT

## 2020-06-11 PROCEDURE — 85025 COMPLETE CBC W/AUTO DIFF WBC: CPT

## 2020-06-11 PROCEDURE — 82570 ASSAY OF URINE CREATININE: CPT

## 2020-06-11 PROCEDURE — 36415 COLL VENOUS BLD VENIPUNCTURE: CPT

## 2020-06-11 PROCEDURE — 82043 UR ALBUMIN QUANTITATIVE: CPT

## 2020-06-30 ENCOUNTER — NON-PROVIDER VISIT (OUTPATIENT)
Dept: MEDICAL GROUP | Facility: PHYSICIAN GROUP | Age: 65
End: 2020-06-30
Payer: COMMERCIAL

## 2020-06-30 DIAGNOSIS — E11.9 DIABETES MELLITUS WITHOUT COMPLICATION (HCC): Primary | ICD-10-CM

## 2020-06-30 PROCEDURE — 98966 PH1 ASSMT&MGMT NQHP 5-10: CPT | Mod: 95,CR | Performed by: FAMILY MEDICINE

## 2020-06-30 NOTE — PROGRESS NOTES
"  Telephone Appointment Visit   As a means of avoiding spread of COVID-19, this visit is being conducted by telephone. This telephone visit was initiated by the patient and they verbally consented.    Time at start of call: 3:03pm    Reason for Call: Management of Uncontrolled Type 2 Diabetes     HPI:     Elizabeth Blunt is a 65 y.o. old patient who comes in today for evaluation of above stated problem  Lab Results   Component Value Date/Time    HBA1C 6.9 (H) 06/11/2020 09:52 AM     Diabetes Medication History and Current Regimen  Metformin/SGLT-2:  Synjardy 5/1000mg BID   GLP-1 Agent/Basal Insulin:  Xultophy 100/3.6mg/mL 30 units every evening        Pt has home glucometer and proper testing technique - YES     Pt reports blood sugars:   Before Breakfast:  no exact readings, states she had a \"high reading\" after eating fruit one evening.  Before Lunch:  Before Dinner:  Before Bedtime:  Other times:     Hypoglycemia awareness - Yes  Nocturnal hypoglycemia- None  Hypoglycemia:  None     Current Exercise - None  Exercise Goal - Discussed the need for 150 minutes of moderate intensity exercise per week.  Discussed incrementally working up to this goal by adding short five to 10 minute walks throughout the week and slowly increasing duration and frequency.     Dietary -  also diabetic, through conversation, seems he is largest influence of dietary choices.  She is familiar with CHO and their role in diet and deleterious effects on diabetic states.  She is unable to commit to large changes to overall diet, but dose understand that portion control could help.     Preventative Management  BP regimen (ACE/ARB) - Losartan/HCTZ  ASA - 81mg daily  Statin - Simvastatin   Last Retinal Scan - 1/2020  Last Foot Exam - 9/2019  Last A1c - today  Last Microalbuminuria - 9/2019    Labs / Images Reviewed:     Lab Results   Component Value Date    CHOLSTRLTOT 156 06/11/2020    LDL 85 06/11/2020    HDL 45 06/11/2020    TRIGLYCERIDE " 128 06/11/2020          Lab Results   Component Value Date    HBA1C 6.9 (H) 06/11/2020      Lab Results   Component Value Date    SODIUM 141 06/11/2020    POTASSIUM 5.1 06/11/2020    CHLORIDE 104 06/11/2020    CO2 24 06/11/2020    GLUCOSE 127 (H) 06/11/2020    BUN 12 06/11/2020    CREATININE 0.52 06/11/2020     Lab Results   Component Value Date    WBC 8.2 06/11/2020    RBC 5.65 (H) 06/11/2020    HEMOGLOBIN 16.4 (H) 06/11/2020    HEMATOCRIT 49.8 (H) 06/11/2020    MCV 88.1 06/11/2020    MCH 29.0 06/11/2020    MCHC 32.9 (L) 06/11/2020    MPV 10.0 06/11/2020         Assessment and Plan:     Assessment and Plan:    1. DM2  Optimized on Xultophy.  Optimized on Synjardy (taking lower dose secondary to morning HA's).  Has begun walking two to three times per week around [facility], she is unable to increase duration due to knee pain, would suggest at next visit to increase frequency of walks once she is in a consistent routine.  Discussed intake of fruit in the evening and relation to increased FBG in the AM, encouraged decreased intake.    - Medication changes -   Increase Synjardy to 5/1000 in the AM and 2.5/500 in the PM  Continue all other medications at current dosing.   - Lifestyle changes -   Continue to focus on clean eating habits.  Encouraged to decrease fruit intake in the evenings if possible.  Continue with increase in exercise level to tolerability.  Would suggest increase in frequency rather than duration due to knee pain.    Follow-up: Three weeks by phone    Sriram Zamorano, PharmD, BCACP    Time at end of call: 3:12pm  Total Time Spent: 5-10 minutes    CC:   WILLIAM Velarde

## 2020-07-06 ENCOUNTER — OFFICE VISIT (OUTPATIENT)
Dept: DERMATOLOGY | Facility: IMAGING CENTER | Age: 65
End: 2020-07-06
Payer: COMMERCIAL

## 2020-07-06 VITALS — WEIGHT: 155 LBS | BODY MASS INDEX: 27.46 KG/M2 | TEMPERATURE: 98.5 F | HEIGHT: 63 IN

## 2020-07-06 DIAGNOSIS — L72.3 SEBACEOUS CYST: ICD-10-CM

## 2020-07-06 DIAGNOSIS — K22.70 BARRETT'S ESOPHAGUS WITHOUT DYSPLASIA: ICD-10-CM

## 2020-07-06 PROCEDURE — 99202 OFFICE O/P NEW SF 15 MIN: CPT | Performed by: NURSE PRACTITIONER

## 2020-07-06 RX ORDER — OMEPRAZOLE 20 MG/1
20 CAPSULE, DELAYED RELEASE ORAL
Qty: 90 CAP | Refills: 0 | Status: SHIPPED | OUTPATIENT
Start: 2020-07-06 | End: 2021-04-21

## 2020-07-06 ASSESSMENT — FIBROSIS 4 INDEX: FIB4 SCORE: 1.03

## 2020-07-06 ASSESSMENT — ENCOUNTER SYMPTOMS
CHILLS: 0
DIAPHORESIS: 0
FEVER: 0
WEIGHT LOSS: 0

## 2020-07-06 NOTE — PROGRESS NOTES
"Subjective:      Elizabeth Blunt is a 65 y.o. female who presents with Establish Care and Cyst            HPI    ROS       Objective:     Temp 36.9 °C (98.5 °F)   Ht 1.6 m (5' 3\")   Wt 70.3 kg (155 lb)   LMP 12/05/2002   BMI 27.46 kg/m²      Physical Exam            Assessment/Plan:       1. Sebaceous cyst  ***    "

## 2020-07-06 NOTE — TELEPHONE ENCOUNTER
Requested Prescriptions     Pending Prescriptions Disp Refills   • omeprazole (PRILOSEC) 20 MG delayed-release capsule 90 Cap 0     Sig: Take 1 Cap by mouth every day.   Velia Taylor M.D.

## 2020-07-06 NOTE — TELEPHONE ENCOUNTER
Received request via: Pharmacy    Was the patient seen in the last year in this department? Yes LOV 03/26/2020    Does the patient have an active prescription (recently filled or refills available) for medication(s) requested? No

## 2020-07-06 NOTE — PROGRESS NOTES
DERMATOLOGY NOTE  NEW VISIT       Chief complaint: Establish Care       HPI: possible cyst   Location: chest    Time present: 2 years   Painful lesion: Yes  Itching lesion: No  Enlarging lesion: Yes  Anything make it better or worse?no         History of skin cancer: No  History of precancers/actinic keratoses: No  History of biopsies:No  History of blistering/severe sunburns:No  Family history of skin cancer:No  Family history of atypical moles:No    Past Medical History:   Diagnosis Date   • Allergic rhinitis    • Dalton's esophagus 2017   • Dental disorder     partial upper   • Diabetes mellitus without complication (HCC) 2015   • Dyslipidemia    • Essential hypertension 2015   • GERD (gastroesophageal reflux disease) 2009   • HTN    • Low back pain    • Migraine headache         Family History   Problem Relation Age of Onset   • Hypertension Mother    • Cancer Mother 85        colon   • Hypertension Father    • Diabetes Father    • Cancer Cousin    • No Known Problems Son    • No Known Problems Son         Social History     Socioeconomic History   • Marital status:      Spouse name: Not on file   • Number of children: Not on file   • Years of education: Not on file   • Highest education level: Not on file   Occupational History   • Occupation: Retired     Employer: [employer]   Social Needs   • Financial resource strain: Not on file   • Food insecurity     Worry: Not on file     Inability: Not on file   • Transportation needs     Medical: Not on file     Non-medical: Not on file   Tobacco Use   • Smoking status: Former Smoker     Packs/day: 0.50     Years: 20.00     Pack years: 10.00     Types: Cigarettes     Last attempt to quit: 2006     Years since quittin.5   • Smokeless tobacco: Never Used   • Tobacco comment: 5 cig's a day for 20 yrs quit in    Substance and Sexual Activity   • Alcohol use: Not Currently     Alcohol/week: 0.0 oz     Comment: 2 per month,   beer   • Drug use: No   • Sexual activity: Not Currently     Partners: Male     Birth control/protection: Surgical   Lifestyle   • Physical activity     Days per week: Not on file     Minutes per session: Not on file   • Stress: Not on file   Relationships   • Social connections     Talks on phone: Not on file     Gets together: Not on file     Attends [employer] service: Not on file     Active member of club or organization: Not on file     Attends meetings of clubs or organizations: Not on file     Relationship status: Not on file   • Intimate partner violence     Fear of current or ex partner: Not on file     Emotionally abused: Not on file     Physically abused: Not on file     Forced sexual activity: Not on file   Other Topics Concern   • Not on file   Social History Narrative     at [employer]        No Known Allergies     MEDICATIONS:  Medications relevant to specialty reviewed.    Current Outpatient Medications:   •  losartan (COZAAR) 100 MG Tab, Take 1 Tab by mouth every day., Disp: 90 Tab, Rfl: 1  •  omeprazole (PRILOSEC) 20 MG delayed-release capsule, TAKE 1 CAPSULE BY MOUTH EVERY DAY, Disp: 30 Cap, Rfl: 1  •  Empagliflozin-metFORMIN HCl (SYNJARDY) 5-1000 MG Tab, Take 1 tablet by mouth 2 Times a Day., Disp: 60 Tab, Rfl: 3  •  Insulin Degludec-Liraglutide (XULTOPHY) 100-3.6 UNIT-MG/ML Solution Pen-injector, Inject 16 Units as instructed every day. Titrate by 2 units as needed and as directed by provider, Disp: 15 mL, Rfl: 3  •  aspirin 81 MG tablet, Take 81 mg by mouth every day., Disp: , Rfl:   •  simvastatin (ZOCOR) 40 MG Tab, Take 1 Tab by mouth every evening., Disp: 90 Tab, Rfl: 3  •  Insulin Pen Needle, 1 Units by Does not apply route every day., Disp: 100 Each, Rfl: 3  •  Lancets, Lancets order: Lancets for insurance approved meter meter. Sig: use daily and prn ssx high or low sugar. #100 RF x 3, Disp: 100 Each, Rfl: 1  •  glucose blood strip, USE DAILY AND AS NEEDED FOR HIGH OR LOW SUGAR., Disp:  100 Strip, Rfl: 3  •  amLODIPine (NORVASC) 5 MG Tab, Take 1 Tab by mouth every day., Disp: 90 Tab, Rfl: 3  •  naproxen (NAPROSYN) 500 MG Tab, TAKE 1 TAB BY MOUTH 2 TIMES DAILY WITH FOOD, Disp: , Rfl: 0  •  Blood Glucose Monitoring Suppl Device, Meter: Dispense Device of Insurance Preference. Sig. Use as directed for blood sugar monitoring. #1. NR., Disp: 1 Device, Rfl: 0  •  Blood Glucose Monitoring Suppl Supplies Misc, Test strips order: Test strips for insurance approved meter. Sig: use daily and prn ssx high or low sugar #100 RF x 3, Disp: 100 Units, Rfl: 1     REVIEW OF SYSTEMS:   Positive for {ROS:68459}  Negative for {ROS:62546}  All other systems reviewed and are negative.     EXAM:  ***    {Exam:41557}       IMPRESSION / PLAN:         Return to clinic in: No follow-ups on file. and as needed for any new or changing skin lesions.

## 2020-07-15 ENCOUNTER — OFFICE VISIT (OUTPATIENT)
Dept: MEDICAL GROUP | Facility: PHYSICIAN GROUP | Age: 65
End: 2020-07-15
Payer: COMMERCIAL

## 2020-07-15 VITALS
OXYGEN SATURATION: 94 % | RESPIRATION RATE: 16 BRPM | TEMPERATURE: 97.3 F | SYSTOLIC BLOOD PRESSURE: 130 MMHG | BODY MASS INDEX: 25.33 KG/M2 | HEIGHT: 65 IN | DIASTOLIC BLOOD PRESSURE: 80 MMHG | HEART RATE: 84 BPM | WEIGHT: 152 LBS

## 2020-07-15 DIAGNOSIS — E11.9 DIABETES MELLITUS WITHOUT COMPLICATION (HCC): ICD-10-CM

## 2020-07-15 DIAGNOSIS — K22.70 BARRETT'S ESOPHAGUS WITHOUT DYSPLASIA: ICD-10-CM

## 2020-07-15 DIAGNOSIS — E78.5 DYSLIPIDEMIA: ICD-10-CM

## 2020-07-15 DIAGNOSIS — Z13.0 SCREENING FOR ENDOCRINE, METABOLIC AND IMMUNITY DISORDER: ICD-10-CM

## 2020-07-15 DIAGNOSIS — R74.8 ELEVATED ALKALINE PHOSPHATASE LEVEL: ICD-10-CM

## 2020-07-15 DIAGNOSIS — I10 ESSENTIAL HYPERTENSION: ICD-10-CM

## 2020-07-15 DIAGNOSIS — Z13.29 SCREENING FOR ENDOCRINE, METABOLIC AND IMMUNITY DISORDER: ICD-10-CM

## 2020-07-15 DIAGNOSIS — Z13.228 SCREENING FOR ENDOCRINE, METABOLIC AND IMMUNITY DISORDER: ICD-10-CM

## 2020-07-15 PROCEDURE — 99214 OFFICE O/P EST MOD 30 MIN: CPT | Performed by: NURSE PRACTITIONER

## 2020-07-15 ASSESSMENT — FIBROSIS 4 INDEX: FIB4 SCORE: 1.03

## 2020-07-15 NOTE — PROGRESS NOTES
Subjective:     CC: Diabetes and referral    HPI:   Jorge L presents today with the following:    Diabetes mellitus without complication (HCC)  Chronic medical problem. She is taking Synjardy 2.5/500 in AM and PM and Xultophy 16 units daily. She has headache with full dose of Synjardy. She is walking 2-3 times a week about 2 miles. She has not made any diet changes.  She is checking her blood sugars. Today her fasting sugar was 146.    Essential hypertension  Chronic medical problem.  She is taking amlodipine 5 mg daily and losartan 100 mg daily.  She is checking her blood pressure at home and gets readings 120's/70's. Denies chest pain, shortness of breath, palpations or dizziness.  Her initial BP today was 140/88.    Dyslipidemia  Chronic medical problem. She is taking simvastatin 40 mg every evening. She is tolerating medication.  Last lab results:  Results for JORGE L PATRICK (MRN [id number]) as of 7/15/2020 17:19   Ref. Range 6/11/2020 09:52   Cholesterol,Tot Latest Ref Range: 100 - 199 mg/dL 156   Triglycerides Latest Ref Range: 0 - 149 mg/dL 128   HDL Latest Ref Range: >=40 mg/dL 45   LDL Latest Ref Range: <100 mg/dL 85       Dalton's esophagus  Chronic medical problem.  She is taking omeprazole 20 mg every day.  She is having intermittent heartburn and phlem.  [facility].  She has been unable to schedule appointment.  She like referral to her previous GI Dr. Escobedo.      Past Medical History:   Diagnosis Date   • Allergic rhinitis    • Dalton's esophagus 4/14/2017   • Dental disorder     partial upper   • Diabetes mellitus without complication (HCC) 11/24/2015   • Dyslipidemia    • Essential hypertension 8/25/2015   • GERD (gastroesophageal reflux disease) 5/7/2009   • HTN    • Low back pain    • Migraine headache        Social History     Tobacco Use   • Smoking status: Former Smoker     Packs/day: 0.50     Years: 20.00     Pack years: 10.00     Types:  Cigarettes     Last attempt to quit: 2006     Years since quittin.5   • Smokeless tobacco: Never Used   • Tobacco comment: 5 cig's a day for 20 yrs quit in 2006   Substance Use Topics   • Alcohol use: Not Currently     Alcohol/week: 0.0 oz     Comment: 2 per month,  beer   • Drug use: No       Current Outpatient Medications Ordered in Epic   Medication Sig Dispense Refill   • omeprazole (PRILOSEC) 20 MG delayed-release capsule Take 1 Cap by mouth every day. 90 Cap 0   • losartan (COZAAR) 100 MG Tab Take 1 Tab by mouth every day. 90 Tab 1   • Empagliflozin-metFORMIN HCl (SYNJARDY) 5-1000 MG Tab Take 1 tablet by mouth 2 Times a Day. 60 Tab 3   • Insulin Degludec-Liraglutide (XULTOPHY) 100-3.6 UNIT-MG/ML Solution Pen-injector Inject 16 Units as instructed every day. Titrate by 2 units as needed and as directed by provider 15 mL 3   • aspirin 81 MG tablet Take 81 mg by mouth every day.     • Insulin Pen Needle 1 Units by Does not apply route every day. 100 Each 3   • Lancets Lancets order: Lancets for insurance approved meter meter. Sig: use daily and prn ssx high or low sugar. #100 RF x 3 100 Each 1   • glucose blood strip USE DAILY AND AS NEEDED FOR HIGH OR LOW SUGAR. 100 Strip 3   • amLODIPine (NORVASC) 5 MG Tab Take 1 Tab by mouth every day. 90 Tab 3   • naproxen (NAPROSYN) 500 MG Tab TAKE 1 TAB BY MOUTH 2 TIMES DAILY WITH FOOD  0   • Blood Glucose Monitoring Suppl Device Meter: Dispense Device of Insurance Preference. Sig. Use as directed for blood sugar monitoring. #1. NR. 1 Device 0   • Blood Glucose Monitoring Suppl Supplies Misc Test strips order: Test strips for insurance approved meter. Sig: use daily and prn ssx high or low sugar #100 RF x 3 100 Units 1   • simvastatin (ZOCOR) 40 MG Tab Take 1 Tab by mouth every evening. (Patient not taking: Reported on 7/15/2020) 90 Tab 3     No current Epic-ordered facility-administered medications on file.        Allergies:  Patient has no known  "allergies.    Health Maintenance: Due for hepatitis C screen and zoster vaccine    ROS:  Gen: no fevers/chills, no changes in weight  Eyes: no changes in vision  ENT: no sore throat  Pulm: no sob, no cough  CV: no chest pain, no palpitations  GI: no nausea/vomiting, no diarrhea,+ heartburn  : no dysuria  MSk: no myalgias  Skin: no rash  Neuro: no headaches, no dizziness      Objective:     Vital signs reviewed  Exam:  /80   Pulse 84   Temp 36.3 °C (97.3 °F) (Temporal)   Resp 16   Ht 1.651 m (5' 5\")   Wt 68.9 kg (152 lb)   LMP 12/05/2002   SpO2 94%   BMI 25.29 kg/m²  Body mass index is 25.29 kg/m².    Gen: Alert and oriented, No apparent distress.  Neck: Neck is supple without lymphadenopathy.  Lungs: Normal effort, CTA bilaterally, no wheezes, rhonchi, or rales  CV: Regular rate and rhythm. No murmurs, rubs, or gallops.  Ext: No clubbing, cyanosis, edema.    Assessment & Plan:     65 y.o. female with the following -     1. Diabetes mellitus without complication (HCC)  Chronic stable medical problem.  Continue Synjardy and Xultophy.  Continue daily blood sugar checks.  Her A1c has improved to 6.9% today.  Monitor and follow.    2. Essential hypertension  Chronic stable medical problem.  BP is at goal today after repeat.  Continue amlodipine and losartan.  Monitor and follow.    3. Dalton's esophagus without dysplasia  Chronic stable medical problem.  [facility]  Continue Meprazole.  Monitor and follow.  - REFERRAL TO GASTROENTEROLOGY    4. Dyslipidemia  Chronic stable medical problem.  Continue simvastatin.  Up-to-date on labs.  Monitor and follow.    5. Elevated alkaline phosphatase level  New problem to examiner.  She does not drink alcohol.  Repeat labs.  If labs remain elevated consider ultrasound right upper quadrant.  Monitor and follow-up via [employer].  - Comp Metabolic Panel; Future    6. Screening for endocrine, metabolic and immunity disorder  New problem to " examiner.  Screening indicated.  Patient in agreement.  Orders placed.  Monitor and follow.  - TSH WITH REFLEX TO FT4; Future      Return in about 3 months (around 10/15/2020) for Diabetes.    Please note that this dictation was created using voice recognition software. I have made every reasonable attempt to correct obvious errors, but I expect that there are errors of grammar and possibly content that I did not discover before finalizing the note.

## 2020-07-15 NOTE — ASSESSMENT & PLAN NOTE
Chronic medical problem. She is taking Synjardy 2.5/500 in AM and PM and Xultophy 16 units daily. She has headache with full dose of Synjardy. She is walking 2-3 times a week about 2 miles. She has not made any diet changes.  She is checking her blood sugars. Today her fasting sugar was 146.

## 2020-07-15 NOTE — ASSESSMENT & PLAN NOTE
Chronic medical problem.  She is taking amlodipine 5 mg daily and losartan 100 mg daily.  She is checking her blood pressure at home and gets readings 120's/70's. Denies chest pain, shortness of breath, palpations or dizziness.  Her initial BP today was 140/88.

## 2020-07-16 NOTE — ASSESSMENT & PLAN NOTE
Chronic medical problem.  She is taking omeprazole 20 mg every day.  She is having intermittent heartburn and phlem.  She was previously referred to [facility].  She has been unable to schedule appointment.  She like referral to her previous GI Dr. Escobedo.

## 2020-07-16 NOTE — ASSESSMENT & PLAN NOTE
Chronic medical problem. She is taking simvastatin 40 mg every evening. She is tolerating medication.  Last lab results:  Results for JORGE L PATRICK (MRN [id number]) as of 7/15/2020 17:19   Ref. Range 6/11/2020 09:52   Cholesterol,Tot Latest Ref Range: 100 - 199 mg/dL 156   Triglycerides Latest Ref Range: 0 - 149 mg/dL 128   HDL Latest Ref Range: >=40 mg/dL 45   LDL Latest Ref Range: <100 mg/dL 85

## 2020-07-30 ENCOUNTER — HOSPITAL ENCOUNTER (OUTPATIENT)
Dept: LAB | Facility: MEDICAL CENTER | Age: 65
End: 2020-07-30
Attending: NURSE PRACTITIONER
Payer: COMMERCIAL

## 2020-07-30 DIAGNOSIS — Z13.0 SCREENING FOR ENDOCRINE, METABOLIC AND IMMUNITY DISORDER: ICD-10-CM

## 2020-07-30 DIAGNOSIS — Z13.29 SCREENING FOR ENDOCRINE, METABOLIC AND IMMUNITY DISORDER: ICD-10-CM

## 2020-07-30 DIAGNOSIS — Z13.228 SCREENING FOR ENDOCRINE, METABOLIC AND IMMUNITY DISORDER: ICD-10-CM

## 2020-07-30 DIAGNOSIS — R74.8 ELEVATED ALKALINE PHOSPHATASE LEVEL: ICD-10-CM

## 2020-07-30 LAB
ALBUMIN SERPL BCP-MCNC: 4.5 G/DL (ref 3.2–4.9)
ALBUMIN/GLOB SERPL: 1.7 G/DL
ALP SERPL-CCNC: 128 U/L (ref 30–99)
ALT SERPL-CCNC: 41 U/L (ref 2–50)
ANION GAP SERPL CALC-SCNC: 16 MMOL/L (ref 7–16)
AST SERPL-CCNC: 28 U/L (ref 12–45)
BILIRUB SERPL-MCNC: 0.5 MG/DL (ref 0.1–1.5)
BUN SERPL-MCNC: 12 MG/DL (ref 8–22)
CALCIUM SERPL-MCNC: 9.9 MG/DL (ref 8.5–10.5)
CHLORIDE SERPL-SCNC: 99 MMOL/L (ref 96–112)
CO2 SERPL-SCNC: 24 MMOL/L (ref 20–33)
CREAT SERPL-MCNC: 0.64 MG/DL (ref 0.5–1.4)
FASTING STATUS PATIENT QL REPORTED: NORMAL
GLOBULIN SER CALC-MCNC: 2.7 G/DL (ref 1.9–3.5)
GLUCOSE SERPL-MCNC: 284 MG/DL (ref 65–99)
POTASSIUM SERPL-SCNC: 4.7 MMOL/L (ref 3.6–5.5)
PROT SERPL-MCNC: 7.2 G/DL (ref 6–8.2)
SODIUM SERPL-SCNC: 139 MMOL/L (ref 135–145)
TSH SERPL DL<=0.005 MIU/L-ACNC: 2.82 UIU/ML (ref 0.38–5.33)

## 2020-07-30 PROCEDURE — 84443 ASSAY THYROID STIM HORMONE: CPT

## 2020-07-30 PROCEDURE — 36415 COLL VENOUS BLD VENIPUNCTURE: CPT

## 2020-07-30 PROCEDURE — 80053 COMPREHEN METABOLIC PANEL: CPT

## 2020-07-31 DIAGNOSIS — R74.8 ELEVATED ALKALINE PHOSPHATASE LEVEL: ICD-10-CM

## 2020-07-31 NOTE — PROGRESS NOTES
LFT's elevated. Order US-RUQ.    Results for JORGE L PATRICK (MRN [id number]) as of 7/31/2020 07:56   Ref. Range 7/30/2020 14:25   AST(SGOT) Latest Ref Range: 12 - 45 U/L 28   ALT(SGPT) Latest Ref Range: 2 - 50 U/L 41   Alkaline Phosphatase Latest Ref Range: 30 - 99 U/L 128 (H)       NOVA Velarde.

## 2020-08-04 ENCOUNTER — HOSPITAL ENCOUNTER (OUTPATIENT)
Dept: RADIOLOGY | Facility: MEDICAL CENTER | Age: 65
End: 2020-08-04
Attending: NURSE PRACTITIONER
Payer: COMMERCIAL

## 2020-08-04 DIAGNOSIS — R74.8 ELEVATED ALKALINE PHOSPHATASE LEVEL: ICD-10-CM

## 2020-08-04 PROCEDURE — 76705 ECHO EXAM OF ABDOMEN: CPT

## 2020-08-11 ENCOUNTER — TELEPHONE (OUTPATIENT)
Dept: VASCULAR LAB | Facility: MEDICAL CENTER | Age: 65
End: 2020-08-11

## 2020-08-26 DIAGNOSIS — E11.9 DIABETES MELLITUS WITHOUT COMPLICATION (HCC): ICD-10-CM

## 2020-08-26 RX ORDER — (INSULIN DEGLUDEC AND LIRAGLUTIDE) 100; 3.6 [IU]/ML; MG/ML
30 INJECTION, SOLUTION SUBCUTANEOUS DAILY
Qty: 45 ML | Refills: 1 | Status: SHIPPED | OUTPATIENT
Start: 2020-08-26 | End: 2020-11-18

## 2020-10-14 ENCOUNTER — OFFICE VISIT (OUTPATIENT)
Dept: MEDICAL GROUP | Facility: PHYSICIAN GROUP | Age: 65
End: 2020-10-14
Payer: COMMERCIAL

## 2020-10-14 VITALS
WEIGHT: 149 LBS | TEMPERATURE: 97.7 F | OXYGEN SATURATION: 95 % | DIASTOLIC BLOOD PRESSURE: 82 MMHG | SYSTOLIC BLOOD PRESSURE: 140 MMHG | RESPIRATION RATE: 16 BRPM | HEIGHT: 65 IN | BODY MASS INDEX: 24.83 KG/M2 | HEART RATE: 89 BPM

## 2020-10-14 DIAGNOSIS — Z23 NEED FOR VACCINATION: ICD-10-CM

## 2020-10-14 DIAGNOSIS — E11.9 DIABETES MELLITUS WITHOUT COMPLICATION (HCC): ICD-10-CM

## 2020-10-14 LAB
HBA1C MFR BLD: 8.6 % (ref 0–5.6)
INT CON NEG: NEGATIVE
INT CON POS: POSITIVE

## 2020-10-14 PROCEDURE — 99213 OFFICE O/P EST LOW 20 MIN: CPT | Mod: 25 | Performed by: NURSE PRACTITIONER

## 2020-10-14 PROCEDURE — 83036 HEMOGLOBIN GLYCOSYLATED A1C: CPT | Performed by: NURSE PRACTITIONER

## 2020-10-14 PROCEDURE — 90686 IIV4 VACC NO PRSV 0.5 ML IM: CPT | Performed by: NURSE PRACTITIONER

## 2020-10-14 PROCEDURE — 90471 IMMUNIZATION ADMIN: CPT | Performed by: NURSE PRACTITIONER

## 2020-10-14 RX ORDER — EMPAGLIFLOZIN AND METFORMIN HYDROCHLORIDE 5; 1000 MG/1; MG/1
1 TABLET ORAL 2 TIMES DAILY
Qty: 60 TAB | Refills: 1 | Status: SHIPPED | OUTPATIENT
Start: 2020-10-14 | End: 2020-12-17 | Stop reason: SDUPTHER

## 2020-10-14 RX ORDER — EMPAGLIFLOZIN AND METFORMIN HYDROCHLORIDE 5; 1000 MG/1; MG/1
1 TABLET ORAL 2 TIMES DAILY
Qty: 180 TAB | Refills: 1 | Status: SHIPPED | OUTPATIENT
Start: 2020-10-14 | End: 2020-10-14 | Stop reason: SDUPTHER

## 2020-10-14 ASSESSMENT — FIBROSIS 4 INDEX: FIB4 SCORE: 0.85

## 2020-10-14 NOTE — ASSESSMENT & PLAN NOTE
Chronic medical problem. For the last month she has not taking Synjardy as she has been unable to afford.  She states that her co-pay for Synjardy is $500. The Xultophy 3 month supply co-pay is $400.  She missed her August appointment with the pharmacist and has not rescheduled.  She is due for her A1c today. She has tried Actos in the past but developed hypoglycemia.  She has tried metformin but she had difficulty tolerating metformin on its own.  She is tolerating her Synjardy when she is able to take.

## 2020-10-14 NOTE — PROGRESS NOTES
Subjective:     CC: Diabetes, medication problem, and immunizations    HPI:   Elizabeth presents today with the following:    Diabetes mellitus without complication (HCC)  Chronic medical problem. For the last month she has not taking Synjardy as she has been unable to afford.  She states that her co-pay for Synjardy is $500. The Xultophy 3 month supply co-pay is $400.  She missed her August appointment with the pharmacist and has not rescheduled.  She is due for her A1c today. She has tried Actos in the past but developed hypoglycemia.  She has tried metformin but she had difficulty tolerating metformin on its own.  She is tolerating her Synjardy when she is able to take.      Past Medical History:   Diagnosis Date   • Allergic rhinitis    • Dalton's esophagus 2017   • Dental disorder     partial upper   • Diabetes mellitus without complication (HCC) 2015   • Dyslipidemia    • Essential hypertension 2015   • GERD (gastroesophageal reflux disease) 2009   • HTN    • Low back pain    • Migraine headache        Social History     Tobacco Use   • Smoking status: Former Smoker     Packs/day: 0.50     Years: 20.00     Pack years: 10.00     Types: Cigarettes     Quit date: 2006     Years since quittin.7   • Smokeless tobacco: Never Used   • Tobacco comment: 5 cig's a day for 20 yrs quit in    Substance Use Topics   • Alcohol use: Not Currently     Alcohol/week: 0.0 oz     Comment: 2 per month,  beer   • Drug use: No       Current Outpatient Medications Ordered in Epic   Medication Sig Dispense Refill   • Empagliflozin-metFORMIN HCl (SYNJARDY) 5-1000 MG Tab Take 1 tablet by mouth 2 Times a Day. 180 Tab 1   • Insulin Degludec-Liraglutide (XULTOPHY) 100-3.6 UNIT-MG/ML Solution Pen-injector Inject 30 Units as instructed every day. 45 mL 1   • omeprazole (PRILOSEC) 20 MG delayed-release capsule Take 1 Cap by mouth every day. 90 Cap 0   • losartan (COZAAR) 100 MG Tab Take 1 Tab by mouth every day. 90  Tab 1   • Insulin Degludec-Liraglutide (XULTOPHY) 100-3.6 UNIT-MG/ML Solution Pen-injector Inject 16 Units as instructed every day. Titrate by 2 units as needed and as directed by provider 15 mL 3   • aspirin 81 MG tablet Take 81 mg by mouth every day.     • Insulin Pen Needle 1 Units by Does not apply route every day. 100 Each 3   • Lancets Lancets order: Lancets for insurance approved meter meter. Sig: use daily and prn ssx high or low sugar. #100 RF x 3 100 Each 1   • glucose blood strip USE DAILY AND AS NEEDED FOR HIGH OR LOW SUGAR. 100 Strip 3   • amLODIPine (NORVASC) 5 MG Tab Take 1 Tab by mouth every day. 90 Tab 3   • naproxen (NAPROSYN) 500 MG Tab TAKE 1 TAB BY MOUTH 2 TIMES DAILY WITH FOOD  0   • Blood Glucose Monitoring Suppl Device Meter: Dispense Device of Insurance Preference. Sig. Use as directed for blood sugar monitoring. #1. NR. 1 Device 0   • Blood Glucose Monitoring Suppl Supplies Misc Test strips order: Test strips for insurance approved meter. Sig: use daily and prn ssx high or low sugar #100 RF x 3 100 Units 1   • simvastatin (ZOCOR) 40 MG Tab Take 1 Tab by mouth every evening. (Patient not taking: Reported on 7/15/2020) 90 Tab 3     No current Epic-ordered facility-administered medications on file.        Allergies:  Patient has no known allergies.    Health Maintenance: Due for hepatitis C screening, zoster vaccine, influenza vaccine, and monofilament exam.  She would like her influenza vaccine today.    ROS:  Gen: no fevers/chills, no changes in weight  Eyes: no changes in vision  ENT: no sore throat, no ear pain  Pulm: no sob, no cough  CV: no chest pain, no palpitations  GI: no nausea/vomiting, no diarrhea, no low blood sugars  : no dysuria, no urgency, no frequency, hematuria  MSk: no myalgias  Skin: no rash  Neuro: no headaches, no dizziness    Objective:     Vital signs reviewed  Exam:  /82 (BP Location: Right arm, Patient Position: Sitting, BP Cuff Size: Adult)   Pulse 89    "Temp 36.5 °C (97.7 °F) (Temporal)   Resp 16   Ht 1.651 m (5' 5\")   Wt 67.6 kg (149 lb)   LMP 12/05/2002   SpO2 95%   BMI 24.79 kg/m²  Body mass index is 24.79 kg/m².    Gen: Alert and oriented, No apparent distress.  Neck: Neck is supple without lymphadenopathy.  Lungs: Normal effort, CTA bilaterally, no wheezes, rhonchi, or rales  CV: Regular rate and rhythm. No murmurs, rubs, or gallops.  Ext: No clubbing, cyanosis, edema.    Monofilament testing with a 10 gram force: sensation intact: intact bilaterally  Visual Inspection: Feet without maceration, ulcers, fissures.  Pedal pulses: intact bilaterally      Labs: A1C 8.6%     Assessment & Plan:     65 y.o. female with the following -     1. Diabetes mellitus without complication (HCC)  Chronic unstable medical problem.  Her A1c today has increased from 6.9% to 8.6%.  She is unable to afford her diabetes medications.  Encouraged her to follow-up with pharmacist.  I will refer her to endocrinology to help with her diabetes.  I checked [facility] updated sample list and they did have Synjardy available.  I gave her a hand prescription for Synjardy that she can take to the [facility] care center for medication refill.  Monofilament exam completed today.  She is up-to-date on labs.  I also gave her good Rx and medication discount from pharmacy if she is unable to  prescription from the health care center.  Monitor and follow.  - POCT Hemoglobin A1C  - REFERRAL TO ENDOCRINOLOGY  - Diabetic Monofilament Lower Extremity Exam  - Empagliflozin-metFORMIN HCl (SYNJARDY) 5-1000 MG Tab; Take 1 tablet by mouth 2 Times a Day.  Dispense: 180 Tab; Refill: 1    2. Need for vaccination  New problem to examiner. Vaccine indicated.  Patient in agreement. I have placed the below orders and discussed them with an approved delegating provider. The MA is performing the below orders under the direction of Dr. Taylor.  - Influenza Vaccine Quad Injection (PF)      Return in " about 3 months (around 1/14/2021) for Diabetes.    Please note that this dictation was created using voice recognition software. I have made every reasonable attempt to correct obvious errors, but I expect that there are errors of grammar and possibly content that I did not discover before finalizing the note.

## 2020-10-15 PROCEDURE — RXMED WILLOW AMBULATORY MEDICATION CHARGE: Performed by: NURSE PRACTITIONER

## 2020-10-19 ENCOUNTER — TELEPHONE (OUTPATIENT)
Dept: MEDICAL GROUP | Facility: PHYSICIAN GROUP | Age: 65
End: 2020-10-19

## 2020-10-19 DIAGNOSIS — E11.9 DIABETES MELLITUS WITHOUT COMPLICATION (HCC): ICD-10-CM

## 2020-10-19 RX ORDER — EMPAGLIFLOZIN AND METFORMIN HYDROCHLORIDE 5; 1000 MG/1; MG/1
1 TABLET ORAL 2 TIMES DAILY
Qty: 60 TAB | Refills: 0 | Status: SHIPPED | OUTPATIENT
Start: 2020-10-19 | End: 2020-11-18

## 2020-10-19 NOTE — TELEPHONE ENCOUNTER
VOICEMAIL  1. Caller Name: Elizabeth Blunt                        Call Back Number: [phone] (home)       2. Message: Elizabeth is calling stating that she is unable to get the medication filled at [facility] with he current rx script. Pt states that the script could be 30 day supply with 0 refills. On the note please include that the medication is going to be samples    3. Patient approves office to leave a detailed voicemail/MyChart message: yes

## 2020-10-19 NOTE — TELEPHONE ENCOUNTER
Phone Number Called: [phone] (home)       Call outcome: Spoke to patient regarding message below.    Message: pt advised and stated that the pharmacy has reached out to her letting her know she is able to  the new rx

## 2020-10-20 ENCOUNTER — PHARMACY VISIT (OUTPATIENT)
Dept: PHARMACY | Facility: MEDICAL CENTER | Age: 65
End: 2020-10-20
Payer: COMMERCIAL

## 2020-11-09 DIAGNOSIS — I10 ESSENTIAL HYPERTENSION: ICD-10-CM

## 2020-11-09 RX ORDER — LOSARTAN POTASSIUM 100 MG/1
100 TABLET ORAL DAILY
Qty: 90 TAB | Refills: 2 | Status: SHIPPED | OUTPATIENT
Start: 2020-11-09 | End: 2021-07-22

## 2020-11-10 NOTE — TELEPHONE ENCOUNTER
Requested Prescriptions     Signed Prescriptions Disp Refills   • losartan (COZAAR) 100 MG Tab 90 Tab 2     Sig: Take 1 Tab by mouth every day.     Authorizing Provider: JESSIE HOOD A.P.R.N.

## 2020-11-10 NOTE — TELEPHONE ENCOUNTER
Received request via: Pharmacy    Was the patient seen in the last year in this department? Yes LOV 10/14/2020    Does the patient have an active prescription (recently filled or refills available) for medication(s) requested? No

## 2020-11-12 DIAGNOSIS — E78.5 DYSLIPIDEMIA: ICD-10-CM

## 2020-11-12 DIAGNOSIS — I10 ESSENTIAL HYPERTENSION: ICD-10-CM

## 2020-11-12 RX ORDER — AMLODIPINE BESYLATE 5 MG/1
5 TABLET ORAL
Qty: 90 TAB | Refills: 3 | Status: SHIPPED | OUTPATIENT
Start: 2020-11-12 | End: 2021-05-14

## 2020-11-12 RX ORDER — SIMVASTATIN 40 MG
40 TABLET ORAL EVERY EVENING
Qty: 90 TAB | Refills: 3 | Status: SHIPPED | OUTPATIENT
Start: 2020-11-12 | End: 2021-12-28

## 2020-11-12 NOTE — TELEPHONE ENCOUNTER
Requested Prescriptions     Signed Prescriptions Disp Refills   • amLODIPine (NORVASC) 5 MG Tab 90 Tab 3     Sig: Take 1 Tab by mouth every day.     Authorizing Provider: JESSIE HOOD   • simvastatin (ZOCOR) 40 MG Tab 90 Tab 3     Sig: Take 1 Tab by mouth every evening.     Authorizing Provider: JESSIE HOOD A.P.R.N.

## 2020-11-12 NOTE — TELEPHONE ENCOUNTER
Received request via: Patient    Was the patient seen in the last year in this department? Yes lov 10/14/2020    Does the patient have an active prescription (recently filled or refills available) for medication(s) requested? No

## 2020-11-13 ENCOUNTER — HOSPITAL ENCOUNTER (OUTPATIENT)
Dept: RADIOLOGY | Facility: MEDICAL CENTER | Age: 65
End: 2020-11-13
Attending: NURSE PRACTITIONER
Payer: COMMERCIAL

## 2020-11-13 ENCOUNTER — OFFICE VISIT (OUTPATIENT)
Dept: MEDICAL GROUP | Facility: PHYSICIAN GROUP | Age: 65
End: 2020-11-13
Payer: COMMERCIAL

## 2020-11-13 VITALS
OXYGEN SATURATION: 96 % | TEMPERATURE: 97.1 F | HEIGHT: 65 IN | SYSTOLIC BLOOD PRESSURE: 136 MMHG | DIASTOLIC BLOOD PRESSURE: 84 MMHG | BODY MASS INDEX: 24.99 KG/M2 | RESPIRATION RATE: 16 BRPM | HEART RATE: 82 BPM | WEIGHT: 150 LBS

## 2020-11-13 DIAGNOSIS — K59.09 OTHER CONSTIPATION: ICD-10-CM

## 2020-11-13 DIAGNOSIS — R42 VERTIGO: ICD-10-CM

## 2020-11-13 PROCEDURE — 74018 RADEX ABDOMEN 1 VIEW: CPT

## 2020-11-13 PROCEDURE — 99214 OFFICE O/P EST MOD 30 MIN: CPT | Performed by: NURSE PRACTITIONER

## 2020-11-13 RX ORDER — MECLIZINE HCL 12.5 MG/1
12.5 TABLET ORAL 3 TIMES DAILY PRN
Qty: 30 TAB | Refills: 0 | Status: SHIPPED | OUTPATIENT
Start: 2020-11-13 | End: 2020-11-18

## 2020-11-13 RX ORDER — BISACODYL 10 MG
10 SUPPOSITORY, RECTAL RECTAL DAILY
Qty: 4 SUPPOSITORY | Refills: 1 | Status: SHIPPED | OUTPATIENT
Start: 2020-11-13 | End: 2020-11-18

## 2020-11-13 ASSESSMENT — FIBROSIS 4 INDEX: FIB4 SCORE: 0.85

## 2020-11-13 NOTE — PATIENT INSTRUCTIONS
Dulcolax pill take 1-2 times a week for a goal of 2 bowel movements minimum weekly.    Take metamucil daily.

## 2020-11-13 NOTE — ASSESSMENT & PLAN NOTE
New problem to examiner. The problem started 5 days ago.The room felt like it was spinning. She was unable to get out of bed the first day. She did have associated blurry vision that has resolved. The problem has improved the last 2 days. She has history of HTN and DM. She did not check her BP and her blood glucose was 118 on day 1. Denies fever, chills, nausea, vomiting, ear pain pain, sore throat, or allergies.

## 2020-11-13 NOTE — PROGRESS NOTES
Subjective:     CC: constipation and vertigo    HPI:   Elizabeth presents today with the following:    Other constipation  New problem to examiner. Her constipation started 8 days. She started taking polyethylene glycol, metamucil and dulcolax pills. She has history of constipation. She has seen GI in 2020 for this and was recommended to take dulcolax 1-2 times weekly, she has not been doing this. She had small BM yesterday and prior to yesterday was 6 days ago. Last night she had abdominal pain. She is passing gas. Denies fever, chills, nausea, vomiting, bloody stools, dysuria or hematuria.       Vertigo  New problem to examiner. The problem started 5 days ago.The room felt like it was spinning. She was unable to get out of bed the first day. She did have associated blurry vision that has resolved. The problem has improved the last 2 days. She has history of HTN and DM. She did not check her BP and her blood glucose was 118 on day 1. Denies fever, chills, nausea, vomiting, ear pain pain, sore throat, or allergies.        Past Medical History:   Diagnosis Date   • Allergic rhinitis    • Dalton's esophagus 2017   • Dental disorder     partial upper   • Diabetes mellitus without complication (HCC) 2015   • Dyslipidemia    • Essential hypertension 2015   • GERD (gastroesophageal reflux disease) 2009   • HTN    • Low back pain    • Migraine headache        Social History     Tobacco Use   • Smoking status: Former Smoker     Packs/day: 0.50     Years: 20.00     Pack years: 10.00     Types: Cigarettes     Quit date: 2006     Years since quittin.8   • Smokeless tobacco: Never Used   • Tobacco comment: 5 cig's a day for 20 yrs quit in    Substance Use Topics   • Alcohol use: Not Currently     Alcohol/week: 0.0 oz     Comment: 2 per month,  beer   • Drug use: No       Current Outpatient Medications Ordered in Epic   Medication Sig Dispense Refill   • bisacodyl (DULCOLAX) 10 MG Suppos Insert 1  Suppository into the rectum every day. 4 Suppository 1   • meclizine (ANTIVERT) 12.5 MG Tab Take 1 Tab by mouth 3 times a day as needed for Dizziness. 30 Tab 0   • amLODIPine (NORVASC) 5 MG Tab Take 1 Tab by mouth every day. 90 Tab 3   • simvastatin (ZOCOR) 40 MG Tab Take 1 Tab by mouth every evening. 90 Tab 3   • losartan (COZAAR) 100 MG Tab Take 1 Tab by mouth every day. 90 Tab 2   • Empagliflozin-metFORMIN HCl (SYNJARDY) 5-1000 MG Tab Take 1 tablet by mouth 2 Times a Day. Use Sample 60 Tab 0   • Empagliflozin-metFORMIN HCl (SYNJARDY) 5-1000 MG Tab Take 1 tablet by mouth 2 Times a Day. Use Sample 60 Tab 1   • Insulin Degludec-Liraglutide (XULTOPHY) 100-3.6 UNIT-MG/ML Solution Pen-injector Inject 30 Units as instructed every day. 45 mL 1   • omeprazole (PRILOSEC) 20 MG delayed-release capsule Take 1 Cap by mouth every day. 90 Cap 0   • Insulin Degludec-Liraglutide (XULTOPHY) 100-3.6 UNIT-MG/ML Solution Pen-injector Inject 16 Units as instructed every day. Titrate by 2 units as needed and as directed by provider 15 mL 3   • aspirin 81 MG tablet Take 81 mg by mouth every day.     • Insulin Pen Needle 1 Units by Does not apply route every day. 100 Each 3   • Lancets Lancets order: Lancets for insurance approved meter meter. Sig: use daily and prn ssx high or low sugar. #100 RF x 3 100 Each 1   • glucose blood strip USE DAILY AND AS NEEDED FOR HIGH OR LOW SUGAR. 100 Strip 3   • naproxen (NAPROSYN) 500 MG Tab TAKE 1 TAB BY MOUTH 2 TIMES DAILY WITH FOOD  0   • Blood Glucose Monitoring Suppl Device Meter: Dispense Device of Insurance Preference. Sig. Use as directed for blood sugar monitoring. #1. NR. 1 Device 0   • Blood Glucose Monitoring Suppl Supplies Misc Test strips order: Test strips for insurance approved meter. Sig: use daily and prn ssx high or low sugar #100 RF x 3 100 Units 1     No current Epic-ordered facility-administered medications on file.        Allergies:  Patient has no known allergies.    Health  "Maintenance: Due for hepatitis C screening and zoster vaccine.     ROS:  Gen: no fevers/chills, no changes in weight  Eyes: + blurry vision (resolved after 1 day)  ENT: no sore throat, no ear pain  Pulm: no sob, no cough  CV: no chest pain, no palpitations  GI: no nausea/vomiting, no diarrhea, no bloody stool, + constipation, + abdominal pain  : no dysuria, no urgency, no frequency, no hematuria, no flankpain  MSk: no myalgias  Skin: no rash  Neuro: no headaches, + vertigo      Objective:     Vital signs reviewed  Exam:  /84 (BP Location: Right arm, Patient Position: Sitting, BP Cuff Size: Adult)   Pulse 82   Temp 36.2 °C (97.1 °F) (Temporal)   Resp 16   Ht 1.651 m (5' 5\")   Wt 68 kg (150 lb)   LMP 12/05/2002   SpO2 96%   BMI 24.96 kg/m²  Body mass index is 24.96 kg/m².    Gen: Alert and oriented, No apparent distress.  Ears:   Bilateral ear canals clear and bilateral tympanic membranes pearly gray.  Neck: Neck is supple without lymphadenopathy.  Lungs: Normal effort, CTA bilaterally, no wheezes, rhonchi, or rales  CV: Regular rate and rhythm. No murmurs, rubs, or gallops.  Abd:     Hyperactive bowel sounds present in all 4 quadrants.  No hepatosplenomegaly.  No tenderness or pain with palpation.  No distention.  Ext: No clubbing, cyanosis, edema.      Assessment & Plan:     65 y.o. female with the following -     1. Other constipation  New problem to examiner.  No acute abdomen today.  Will check x-ray for constipation.  Discussed with patient that she should continue with Metamucil for daily maintenance, as this will increase her daily fiber intake.  Discussed with patient, after review of GI consult note, that she should take her Dulcolax 1-2 times a week minimum for bowel movement goal of 2 a week.  She will continue plan.  Discussed continuing MiraLAX.  Discussed starting Dulcolax suppository.  Discussed bowel management.  Discussed she may also try drinking prune juice with better to see if " this helps.  She verbalized understanding.  Red flags discussed with strict ER precautions.  Monitor and follow.  - bisacodyl (DULCOLAX) 10 MG Suppos; Insert 1 Suppository into the rectum every day.  Dispense: 4 Suppository; Refill: 1  - [id number] VIEW; Future    2. Vertigo  New problem to examiner.  Her symptoms have resolved and her ear exam is benign today.  Discussed that if this happens again she should check her blood pressure and her blood sugar.  Her BP is at goal today.  Discussed that if this happens again she may try taking meclizine as needed.  She verbalized understanding.  Red flags discussed.  Monitor and follow.  - meclizine (ANTIVERT) 12.5 MG Tab; Take 1 Tab by mouth 3 times a day as needed for Dizziness.  Dispense: 30 Tab; Refill: 0      Return if symptoms worsen or fail to improve.    Please note that this dictation was created using voice recognition software. I have made every reasonable attempt to correct obvious errors, but I expect that there are errors of grammar and possibly content that I did not discover before finalizing the note.

## 2020-11-13 NOTE — ASSESSMENT & PLAN NOTE
New problem to examiner. Her constipation started 8 days. She started taking polyethylene glycol, metamucil and dulcolax pills. She has history of constipation. She has seen GI in 9/2020 for this and was recommended to take dulcolax 1-2 times weekly, she has not been doing this. She had small BM yesterday and prior to yesterday was 6 days ago. Last night she had abdominal pain. She is passing gas. Denies fever, chills, nausea, vomiting, bloody stools, dysuria or hematuria.

## 2020-11-18 ENCOUNTER — OFFICE VISIT (OUTPATIENT)
Dept: URGENT CARE | Facility: PHYSICIAN GROUP | Age: 65
End: 2020-11-18
Payer: COMMERCIAL

## 2020-11-18 ENCOUNTER — HOSPITAL ENCOUNTER (OUTPATIENT)
Facility: MEDICAL CENTER | Age: 65
End: 2020-11-18
Attending: NURSE PRACTITIONER
Payer: COMMERCIAL

## 2020-11-18 VITALS
TEMPERATURE: 97.8 F | HEART RATE: 88 BPM | RESPIRATION RATE: 14 BRPM | WEIGHT: 150 LBS | BODY MASS INDEX: 26.58 KG/M2 | OXYGEN SATURATION: 96 % | HEIGHT: 63 IN | DIASTOLIC BLOOD PRESSURE: 84 MMHG | SYSTOLIC BLOOD PRESSURE: 130 MMHG

## 2020-11-18 DIAGNOSIS — R53.81 MALAISE AND FATIGUE: ICD-10-CM

## 2020-11-18 DIAGNOSIS — R50.9 FEVER, UNSPECIFIED FEVER CAUSE: ICD-10-CM

## 2020-11-18 DIAGNOSIS — R52 GENERALIZED BODY ACHES: ICD-10-CM

## 2020-11-18 DIAGNOSIS — Z20.822 SUSPECTED COVID-19 VIRUS INFECTION: Primary | ICD-10-CM

## 2020-11-18 DIAGNOSIS — R53.83 MALAISE AND FATIGUE: ICD-10-CM

## 2020-11-18 LAB
FLUAV+FLUBV AG SPEC QL IA: NORMAL
INT CON NEG: NEGATIVE
INT CON POS: POSITIVE

## 2020-11-18 PROCEDURE — 87804 INFLUENZA ASSAY W/OPTIC: CPT | Performed by: NURSE PRACTITIONER

## 2020-11-18 PROCEDURE — U0003 INFECTIOUS AGENT DETECTION BY NUCLEIC ACID (DNA OR RNA); SEVERE ACUTE RESPIRATORY SYNDROME CORONAVIRUS 2 (SARS-COV-2) (CORONAVIRUS DISEASE [COVID-19]), AMPLIFIED PROBE TECHNIQUE, MAKING USE OF HIGH THROUGHPUT TECHNOLOGIES AS DESCRIBED BY CMS-2020-01-R: HCPCS

## 2020-11-18 PROCEDURE — 99214 OFFICE O/P EST MOD 30 MIN: CPT | Mod: CS | Performed by: NURSE PRACTITIONER

## 2020-11-18 ASSESSMENT — ENCOUNTER SYMPTOMS
FEVER: 1
PALPITATIONS: 0
NERVOUS/ANXIOUS: 0
SHORTNESS OF BREATH: 0
CONSTIPATION: 0
WHEEZING: 0
DEPRESSION: 0
SORE THROAT: 0
DIARRHEA: 0
SENSORY CHANGE: 0
DIZZINESS: 0
MYALGIAS: 1
BACK PAIN: 0
COUGH: 1
CLAUDICATION: 0
EYE PAIN: 0
FOCAL WEAKNESS: 0
CHILLS: 1
HEADACHES: 1
HEMOPTYSIS: 0

## 2020-11-18 ASSESSMENT — FIBROSIS 4 INDEX: FIB4 SCORE: 0.85

## 2020-11-18 ASSESSMENT — COPD QUESTIONNAIRES: COPD: 0

## 2020-11-18 NOTE — PROGRESS NOTES
"Subjective:      Elizabeth Blunt is a 65 y.o. female who presents with Chills (bodyaches, fever, headaches x3 days)          No known Covid 19 exposure  +similarly ill contacts in the household  PMH: Type 2 DM    Cough  This is a new problem. Episode onset: In the past 3 days. The problem has been unchanged. The problem occurs every few minutes. The cough is non-productive. Associated symptoms include chills, a fever, headaches, myalgias and nasal congestion. Pertinent negatives include no ear pain, hemoptysis, postnasal drip, sore throat, shortness of breath or wheezing. Nothing aggravates the symptoms. Treatments tried: NSAIDs. The treatment provided moderate relief. Her past medical history is significant for environmental allergies. There is no history of asthma, COPD, emphysema or pneumonia.       Review of Systems   Constitutional: Positive for chills, fever and malaise/fatigue.   HENT: Negative for ear pain, postnasal drip and sore throat.    Eyes: Negative for pain.   Respiratory: Positive for cough. Negative for hemoptysis, shortness of breath and wheezing.    Cardiovascular: Negative for palpitations, claudication and leg swelling.   Gastrointestinal: Negative for constipation and diarrhea.   Genitourinary: Negative for dysuria.   Musculoskeletal: Positive for myalgias. Negative for back pain and joint pain.   Neurological: Positive for headaches. Negative for dizziness, sensory change and focal weakness.   Endo/Heme/Allergies: Positive for environmental allergies.   Psychiatric/Behavioral: Negative for depression. The patient is not nervous/anxious.           Objective:     /84 (BP Location: Left arm, Patient Position: Sitting, BP Cuff Size: Adult)   Pulse 88   Temp 36.6 °C (97.8 °F) (Temporal)   Resp 14   Ht 1.6 m (5' 3\")   Wt 68 kg (150 lb)   LMP 12/05/2002   SpO2 96%   BMI 26.57 kg/m²      Physical Exam  Vitals signs reviewed.   Constitutional:       General: She is not in acute " distress.     Appearance: Normal appearance. She is ill-appearing.   HENT:      Head: Normocephalic.      Right Ear: Tympanic membrane, ear canal and external ear normal.      Left Ear: Tympanic membrane, ear canal and external ear normal.      Nose: Nose normal. No congestion or rhinorrhea.      Mouth/Throat:      Mouth: Mucous membranes are moist.      Pharynx: Posterior oropharyngeal erythema present. No oropharyngeal exudate.   Eyes:      Extraocular Movements: Extraocular movements intact.      Conjunctiva/sclera: Conjunctivae normal.      Pupils: Pupils are equal, round, and reactive to light.   Neck:      Musculoskeletal: Normal range of motion and neck supple.   Cardiovascular:      Rate and Rhythm: Normal rate and regular rhythm.      Pulses: Normal pulses.      Heart sounds: Normal heart sounds. No murmur.   Pulmonary:      Effort: Pulmonary effort is normal. No respiratory distress.      Breath sounds: Normal breath sounds. No wheezing, rhonchi or rales.   Abdominal:      General: Abdomen is flat. Bowel sounds are normal.      Palpations: Abdomen is soft.      Tenderness: There is no abdominal tenderness.   Musculoskeletal: Normal range of motion.      Right lower leg: No edema.      Left lower leg: No edema.   Lymphadenopathy:      Cervical: No cervical adenopathy.   Skin:     General: Skin is warm and dry.      Capillary Refill: Capillary refill takes less than 2 seconds.   Neurological:      General: No focal deficit present.      Mental Status: She is alert and oriented to person, place, and time.   Psychiatric:         Mood and Affect: Mood normal.         Behavior: Behavior normal.                 Assessment/Plan:        1. Suspected COVID-19 virus infection  COVID/SARS COV-2 PCR    POCT Influenza A/B   2. Generalized body aches  COVID/SARS COV-2 PCR    POCT Influenza A/B   3. Fever, unspecified fever cause  COVID/SARS COV-2 PCR    POCT Influenza A/B   4. Malaise and fatigue  COVID/SARS COV-2 PCR     POCT Influenza A/B     Negative influenza a/b    May take over-the-counter Ibuprofen 400-600 mg every 8 hours as needed for pain    Await Covid results.    Discussed with patient that symptoms are suspicious for Covid-19 infection vs other viral illness.  Vitals are stable at this time.  Patient is advised to self isolate and provided with self isolation precautions AVS information.  Discussed when to return to urgent care or ER including for worsening shortness of breath.  Patient verbalized understanding and has no additional questions.    Plan of care, medications and treatments reviewed with patient and or guardian.  Patient and or guardian voices understanding and agrees with the instructions provided. After visit summary reviewed with patient. Patient and or guardian understand the parameters for reevaluation and ER precautions discussed.     Please note that this dictation was created using voice recognition software. I have made every reasonable attempt to correct obvious errors, but I expect that there are errors of grammar and possibly content that I did not discover before finalizing the note.

## 2020-11-19 DIAGNOSIS — R50.9 FEVER, UNSPECIFIED FEVER CAUSE: ICD-10-CM

## 2020-11-19 DIAGNOSIS — R52 GENERALIZED BODY ACHES: ICD-10-CM

## 2020-11-19 DIAGNOSIS — Z20.822 SUSPECTED COVID-19 VIRUS INFECTION: ICD-10-CM

## 2020-11-19 DIAGNOSIS — R53.81 MALAISE AND FATIGUE: ICD-10-CM

## 2020-11-19 DIAGNOSIS — R53.83 MALAISE AND FATIGUE: ICD-10-CM

## 2020-11-19 LAB — COVID ORDER STATUS COVID19: NORMAL

## 2020-11-20 LAB
SARS-COV-2 RNA RESP QL NAA+PROBE: DETECTED
SPECIMEN SOURCE: ABNORMAL

## 2020-11-24 ENCOUNTER — TELEPHONE (OUTPATIENT)
Dept: MEDICAL GROUP | Facility: PHYSICIAN GROUP | Age: 65
End: 2020-11-24

## 2020-11-24 ENCOUNTER — HOSPITAL ENCOUNTER (EMERGENCY)
Facility: MEDICAL CENTER | Age: 65
End: 2020-11-24
Attending: EMERGENCY MEDICINE
Payer: COMMERCIAL

## 2020-11-24 ENCOUNTER — APPOINTMENT (OUTPATIENT)
Dept: RADIOLOGY | Facility: MEDICAL CENTER | Age: 65
End: 2020-11-24
Attending: EMERGENCY MEDICINE
Payer: COMMERCIAL

## 2020-11-24 VITALS
BODY MASS INDEX: 25.51 KG/M2 | DIASTOLIC BLOOD PRESSURE: 74 MMHG | TEMPERATURE: 98.7 F | OXYGEN SATURATION: 93 % | RESPIRATION RATE: 20 BRPM | WEIGHT: 143.96 LBS | SYSTOLIC BLOOD PRESSURE: 126 MMHG | HEART RATE: 89 BPM | HEIGHT: 63 IN

## 2020-11-24 DIAGNOSIS — R05.9 COUGH: ICD-10-CM

## 2020-11-24 DIAGNOSIS — U07.1 COVID-19: ICD-10-CM

## 2020-11-24 PROCEDURE — 71045 X-RAY EXAM CHEST 1 VIEW: CPT

## 2020-11-24 PROCEDURE — 99284 EMERGENCY DEPT VISIT MOD MDM: CPT

## 2020-11-24 ASSESSMENT — FIBROSIS 4 INDEX: FIB4 SCORE: 0.85

## 2020-11-24 NOTE — TELEPHONE ENCOUNTER
VOICEMAIL  1. Caller Name: Aroldo Blunt                        Call Back Number: [phone] (home)       2. Message: aroldo was recently diagnosed with covid and wanted to let us know she is going to the hospital.    3. Patient approves office to leave a detailed voicemail/MyChart message: yes

## 2020-11-24 NOTE — TELEPHONE ENCOUNTER
Phone Number Called: [phone] (home)       Call outcome: Spoke to patient regarding message below.    Message: pt is having a difficult time breathing and is going to self admit herself into the hospital

## 2020-11-24 NOTE — ED NOTES
"0954:  PIV placed in LAC, blood and BC x 1 drawn and sent to lab.  Pt reports is feeling more short of breath, worse after coughing.  Pt reports spouse is currently at [facility] w/ Covid and is concerned will be admitted \"so far away from him.\"  Pt updated on POC including pending tests and chart review by ERP.  Call light in .  Provided w/ warm blanket.    "

## 2020-11-24 NOTE — ED NOTES
Chief Complaint   Patient presents with   • Cough     Covid test Friday that was positive, feels that she is getting worse.  is in the hospital.   Contact Earnestine [phone]

## 2020-11-24 NOTE — ED PROVIDER NOTES
ED Provider Note    CHIEF COMPLAINT  Chief Complaint   Patient presents with   • Cough     Covid test Friday that was positive, feels that she is getting worse.  is in the hospital.       HPI  Elizabeth Blunt is a 65 y.o. female who presents for evaluation of cough that she has had for the past week, she tested positive for Covid.  She has been short of breath.  She reports her  is hospitalized with Covid at a different facility.  She had diarrhea, no vomiting, no abdominal pain.  She has a tightness in her chest when she is coughing.  History of diabetes, GERD, migraines and low back pain.  She reports fever at nighttime only.  At this time she offers no other specific complaints.    REVIEW OF SYSTEMS  Negative for rash, abdominal pain, nausea, vomiting, focal weakness, focal numbness, focal tingling. All other systems are negative.     PAST MEDICAL HISTORY  Past Medical History:   Diagnosis Date   • Allergic rhinitis    • Dalton's esophagus 2017   • Dental disorder     partial upper   • Diabetes mellitus without complication (HCC) 2015   • Dyslipidemia    • Essential hypertension 2015   • GERD (gastroesophageal reflux disease) 2009   • HTN    • Low back pain    • Migraine headache        FAMILY HISTORY  Family History   Problem Relation Age of Onset   • Hypertension Mother    • Cancer Mother 85        colon   • Hypertension Father    • Diabetes Father    • Cancer Cousin    • No Known Problems Son    • No Known Problems Son        SOCIAL HISTORY  Social History     Tobacco Use   • Smoking status: Former Smoker     Packs/day: 0.50     Years: 20.00     Pack years: 10.00     Types: Cigarettes     Quit date: 2006     Years since quittin.9   • Smokeless tobacco: Never Used   • Tobacco comment: 5 cig's a day for 20 yrs quit in    Substance Use Topics   • Alcohol use: Not Currently     Alcohol/week: 0.0 oz     Comment: 2 per month,  beer   • Drug use: No       SURGICAL  "HISTORY  Past Surgical History:   Procedure Laterality Date   • ROTATOR CUFF REPAIR Left 08/2017   • OREN BY LAPAROSCOPY Bilateral 8/3/2016    Procedure: OREN BY LAPAROSCOPY;  Surgeon: Scott Canales M.D.;  Location: SURGERY SAME DAY [facility];  Service:    • KNEE ARTHROSCOPY  2/13/2014    Performed by Wiley Kenyon M.D. at SURGERY [facility]   • MEDIAL MENISCECTOMY  2/13/2014    Performed by Wiley Kenyon M.D. at SURGERY [facility]   • SEPTOTURBINOPLASTY  3/30/2010    Performed by TRAV MENJIVAR at SURGERY SAME DAY [facility]   • SIGMOIDOSCOPY FLEX  3/10    normal   • COLONOSCOPY  3/09    normal   • ANKLE ORIF  5/07   • APPENDECTOMY  2003   • ABDOMINAL HYSTERECTOMY TOTAL  2002    partial due to fibroids       CURRENT MEDICATIONS  I personally reviewed the medication list in the charting documentation.     ALLERGIES  No Known Allergies    MEDICAL RECORD  I have reviewed patient's medical record and pertinent results are listed above.      PHYSICAL EXAM  VITAL SIGNS: /84   Pulse 93   Temp 37.1 °C (98.7 °F) (Temporal)   Resp (!) 21   Ht 1.6 m (5' 3\")   Wt 65.3 kg (143 lb 15.4 oz)   LMP 12/05/2002   SpO2 91%   BMI 25.50 kg/m²    Constitutional: Well appearing patient in no acute distress.  Not toxic, nor ill in appearance.  HENT: Normocephalic, no evidence of acute trauma.  Eyes: No scleral icterus. Normal conjunctiva   Neck: Supple, comfortable, nonpainful range of motion.   Thorax & Lungs: Normal nonlabored respirations, frequent cough on exam.  Abdomen: Soft, with no tenderness, rebound nor guarding.  No mass or pulsatile mass appreciated.  Skin: Warm, dry. No rash appreciated  Extremities/Musculoskeletal: No sign of trauma. No asymmetric calf tenderness, erythema or edema. Normal range of motion   Neurologic: Alert & oriented. No focal deficits observed.   Psychiatric: Normal affect appropriate for the clinical situation.    DIAGNOSTIC STUDIES / PROCEDURES    RADIOLOGY  DX-CHEST-LIMITED " (1 VIEW)   Final Result      Multifocal patchy lower lung zone consolidation is compatible with Covid pneumonia            COURSE & MEDICAL DECISION MAKING  I have reviewed any medical record information, laboratory studies and radiographic results as noted above.    Encounter Summary: This is a 65 y.o. female with COVID-19 diagnosed last week, has had a cough since then with some shortness of breath and chest pain when coughing, on exam she is frequently coughing, she is in no respiratory distress and his oxygen saturations ranging from 91 to 94%.  Chest x-ray obtained revealing no events of bacterial pneumonia, no pneumothorax.  Findings are consistent with Covid pneumonia.  At this point patient will go home and continue her strict quarantine, however strict return instructions, stable and appropriate for discharge.      DISPOSITION: Discharge home in stable condition      FINAL IMPRESSION  1. COVID-19    2. Cough           This dictation was created using voice recognition software. The accuracy of the dictation is limited to the abilities of the software. I expect there may be some errors of grammar and possibly content. The nursing notes were reviewed and certain aspects of this information were incorporated into this note.    Electronically signed by: Robert Echeverria M.D., 11/24/2020 10:49 AM

## 2020-12-17 DIAGNOSIS — E11.9 DIABETES MELLITUS WITHOUT COMPLICATION (HCC): ICD-10-CM

## 2020-12-17 PROCEDURE — RXMED WILLOW AMBULATORY MEDICATION CHARGE: Performed by: NURSE PRACTITIONER

## 2020-12-17 RX ORDER — EMPAGLIFLOZIN AND METFORMIN HYDROCHLORIDE 5; 1000 MG/1; MG/1
1 TABLET ORAL 2 TIMES DAILY
Qty: 60 TAB | Refills: 0 | Status: SHIPPED | OUTPATIENT
Start: 2020-12-17 | End: 2021-01-11 | Stop reason: SDUPTHER

## 2020-12-17 NOTE — TELEPHONE ENCOUNTER
Received request via: Pharmacy    Was the patient seen in the last year in this department? Yes lov 11/13/2020    Does the patient have an active prescription (recently filled or refills available) for medication(s) requested? No  
Requested Prescriptions     Signed Prescriptions Disp Refills   • Empagliflozin-metFORMIN HCl (SYNJARDY) 5-1000 MG Tab 60 Tab 0     Sig: Take 1 tablet by mouth 2 Times a Day. Use Sample     Authorizing Provider: JESSIE HOOD A.P.R.N.   
none

## 2020-12-18 ENCOUNTER — PHARMACY VISIT (OUTPATIENT)
Dept: PHARMACY | Facility: MEDICAL CENTER | Age: 65
End: 2020-12-18
Payer: COMMERCIAL

## 2020-12-26 ENCOUNTER — APPOINTMENT (OUTPATIENT)
Dept: RADIOLOGY | Facility: MEDICAL CENTER | Age: 65
End: 2020-12-26
Attending: EMERGENCY MEDICINE
Payer: COMMERCIAL

## 2020-12-26 ENCOUNTER — APPOINTMENT (OUTPATIENT)
Dept: URGENT CARE | Facility: PHYSICIAN GROUP | Age: 65
End: 2020-12-26
Payer: COMMERCIAL

## 2020-12-26 ENCOUNTER — HOSPITAL ENCOUNTER (EMERGENCY)
Facility: MEDICAL CENTER | Age: 65
End: 2020-12-26
Attending: EMERGENCY MEDICINE | Admitting: EMERGENCY MEDICINE
Payer: COMMERCIAL

## 2020-12-26 VITALS
DIASTOLIC BLOOD PRESSURE: 95 MMHG | OXYGEN SATURATION: 95 % | TEMPERATURE: 97.5 F | BODY MASS INDEX: 25.78 KG/M2 | SYSTOLIC BLOOD PRESSURE: 148 MMHG | HEIGHT: 63 IN | WEIGHT: 145.5 LBS | RESPIRATION RATE: 16 BRPM | HEART RATE: 76 BPM

## 2020-12-26 DIAGNOSIS — M54.2 NECK PAIN: ICD-10-CM

## 2020-12-26 DIAGNOSIS — M25.512 ACUTE PAIN OF LEFT SHOULDER: ICD-10-CM
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
## 2020-12-26 LAB
ALBUMIN SERPL BCP-MCNC: 4 G/DL (ref 3.2–4.9)
ALBUMIN/GLOB SERPL: 1.5 G/DL
ALP SERPL-CCNC: 98 U/L (ref 30–99)
ALT SERPL-CCNC: 21 U/L (ref 2–50)
ANION GAP SERPL CALC-SCNC: 13 MMOL/L (ref 7–16)
APTT PPP: 26.2 SEC (ref 24.7–36)
AST SERPL-CCNC: 21 U/L (ref 12–45)
BASOPHILS # BLD AUTO: 0.5 % (ref 0–1.8)
BASOPHILS # BLD: 0.04 K/UL (ref 0–0.12)
BILIRUB SERPL-MCNC: 0.3 MG/DL (ref 0.1–1.5)
BUN SERPL-MCNC: 16 MG/DL (ref 8–22)
CALCIUM SERPL-MCNC: 8.6 MG/DL (ref 8.4–10.2)
CHLORIDE SERPL-SCNC: 104 MMOL/L (ref 96–112)
CO2 SERPL-SCNC: 21 MMOL/L (ref 20–33)
CREAT SERPL-MCNC: 0.44 MG/DL (ref 0.5–1.4)
EOSINOPHIL # BLD AUTO: 0.08 K/UL (ref 0–0.51)
EOSINOPHIL NFR BLD: 0.9 % (ref 0–6.9)
ERYTHROCYTE [DISTWIDTH] IN BLOOD BY AUTOMATED COUNT: 43.8 FL (ref 35.9–50)
GLOBULIN SER CALC-MCNC: 2.7 G/DL (ref 1.9–3.5)
GLUCOSE SERPL-MCNC: 99 MG/DL (ref 65–99)
HCT VFR BLD AUTO: 45.1 % (ref 37–47)
HGB BLD-MCNC: 14.8 G/DL (ref 12–16)
IMM GRANULOCYTES # BLD AUTO: 0.04 K/UL (ref 0–0.11)
IMM GRANULOCYTES NFR BLD AUTO: 0.5 % (ref 0–0.9)
INR PPP: 0.9 (ref 0.87–1.13)
LYMPHOCYTES # BLD AUTO: 2.27 K/UL (ref 1–4.8)
LYMPHOCYTES NFR BLD: 25.7 % (ref 22–41)
MCH RBC QN AUTO: 28.2 PG (ref 27–33)
MCHC RBC AUTO-ENTMCNC: 32.8 G/DL (ref 33.6–35)
MCV RBC AUTO: 86.1 FL (ref 81.4–97.8)
MONOCYTES # BLD AUTO: 0.71 K/UL (ref 0–0.85)
MONOCYTES NFR BLD AUTO: 8 % (ref 0–13.4)
NEUTROPHILS # BLD AUTO: 5.69 K/UL (ref 2–7.15)
NEUTROPHILS NFR BLD: 64.4 % (ref 44–72)
NRBC # BLD AUTO: 0 K/UL
NRBC BLD-RTO: 0 /100 WBC
PLATELET # BLD AUTO: 217 K/UL (ref 164–446)
PMV BLD AUTO: 9.4 FL (ref 9–12.9)
POTASSIUM SERPL-SCNC: 3.5 MMOL/L (ref 3.6–5.5)
PROT SERPL-MCNC: 6.7 G/DL (ref 6–8.2)
PROTHROMBIN TIME: 11.9 SEC (ref 12–14.6)
RBC # BLD AUTO: 5.24 M/UL (ref 4.2–5.4)
SODIUM SERPL-SCNC: 138 MMOL/L (ref 135–145)
WBC # BLD AUTO: 8.8 K/UL (ref 4.8–10.8)

## 2020-12-26 PROCEDURE — A9270 NON-COVERED ITEM OR SERVICE: HCPCS | Performed by: EMERGENCY MEDICINE

## 2020-12-26 PROCEDURE — 71045 X-RAY EXAM CHEST 1 VIEW: CPT

## 2020-12-26 PROCEDURE — 80053 COMPREHEN METABOLIC PANEL: CPT

## 2020-12-26 PROCEDURE — 85730 THROMBOPLASTIN TIME PARTIAL: CPT

## 2020-12-26 PROCEDURE — 85025 COMPLETE CBC W/AUTO DIFF WBC: CPT

## 2020-12-26 PROCEDURE — 73010 X-RAY EXAM OF SHOULDER BLADE: CPT | Mod: LT

## 2020-12-26 PROCEDURE — 99284 EMERGENCY DEPT VISIT MOD MDM: CPT

## 2020-12-26 PROCEDURE — 85610 PROTHROMBIN TIME: CPT

## 2020-12-26 PROCEDURE — 70450 CT HEAD/BRAIN W/O DYE: CPT

## 2020-12-26 PROCEDURE — 72125 CT NECK SPINE W/O DYE: CPT

## 2020-12-26 PROCEDURE — 73030 X-RAY EXAM OF SHOULDER: CPT | Mod: LT

## 2020-12-26 PROCEDURE — 700102 HCHG RX REV CODE 250 W/ 637 OVERRIDE(OP): Performed by: EMERGENCY MEDICINE

## 2020-12-26 RX ORDER — ACETAMINOPHEN 325 MG/1
650 TABLET ORAL ONCE
Status: COMPLETED | OUTPATIENT
Start: 2020-12-26 | End: 2020-12-26

## 2020-12-26 RX ORDER — ACETAMINOPHEN 325 MG/1
650 TABLET ORAL EVERY 6 HOURS PRN
Qty: 30 TAB | Refills: 0 | Status: SHIPPED | OUTPATIENT
Start: 2020-12-26 | End: 2021-05-07

## 2020-12-26 RX ORDER — METHOCARBAMOL 750 MG/1
750 TABLET, FILM COATED ORAL 3 TIMES DAILY
Qty: 30 TAB | Refills: 0 | Status: SHIPPED | OUTPATIENT
Start: 2020-12-26 | End: 2021-01-05

## 2020-12-26 RX ORDER — METHOCARBAMOL 500 MG/1
500 TABLET, FILM COATED ORAL ONCE
Status: COMPLETED | OUTPATIENT
Start: 2020-12-26 | End: 2020-12-26

## 2020-12-26 RX ORDER — NAPROXEN 500 MG/1
500 TABLET ORAL 2 TIMES DAILY WITH MEALS
Qty: 30 TAB | Refills: 0 | Status: SHIPPED | OUTPATIENT
Start: 2020-12-26 | End: 2021-01-10

## 2020-12-26 RX ADMIN — ACETAMINOPHEN 650 MG: 325 TABLET, FILM COATED ORAL at 21:08

## 2020-12-26 RX ADMIN — METHOCARBAMOL TABLETS 500 MG: 500 TABLET, COATED ORAL at 21:07

## 2020-12-26 ASSESSMENT — LIFESTYLE VARIABLES: DO YOU DRINK ALCOHOL: NO

## 2020-12-26 ASSESSMENT — PAIN DESCRIPTION - DESCRIPTORS: DESCRIPTORS: ACHING;PRESSURE

## 2020-12-26 ASSESSMENT — PAIN DESCRIPTION - PAIN TYPE: TYPE: ACUTE PAIN

## 2020-12-26 ASSESSMENT — FIBROSIS 4 INDEX: FIB4 SCORE: 0.85
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
## 2020-12-27 NOTE — ED NOTES
Discharge information reviewed in detail. Pt verbalized understanding of discharge instructions to follow up with PCP and to return to ER if condition worsens. Pt educated/expressed awareness of not driving or operating heavy machinery.   Patient educated no combining of alcohol with other sedating medications.   .   Pt ambulated out of ER in a steady gait.

## 2021-01-11 ENCOUNTER — OFFICE VISIT (OUTPATIENT)
Dept: MEDICAL GROUP | Facility: PHYSICIAN GROUP | Age: 66
End: 2021-01-11
Payer: COMMERCIAL

## 2021-01-11 VITALS
HEART RATE: 71 BPM | OXYGEN SATURATION: 96 % | HEIGHT: 65 IN | SYSTOLIC BLOOD PRESSURE: 134 MMHG | TEMPERATURE: 97.5 F | DIASTOLIC BLOOD PRESSURE: 76 MMHG | BODY MASS INDEX: 24.49 KG/M2 | RESPIRATION RATE: 14 BRPM | WEIGHT: 147 LBS

## 2021-01-11 DIAGNOSIS — Z13.0 SCREENING FOR ENDOCRINE, METABOLIC AND IMMUNITY DISORDER: ICD-10-CM

## 2021-01-11 DIAGNOSIS — I10 ESSENTIAL HYPERTENSION: ICD-10-CM

## 2021-01-11 DIAGNOSIS — Z13.228 SCREENING FOR ENDOCRINE, METABOLIC AND IMMUNITY DISORDER: ICD-10-CM

## 2021-01-11 DIAGNOSIS — E11.9 DIABETES MELLITUS WITHOUT COMPLICATION (HCC): ICD-10-CM

## 2021-01-11 DIAGNOSIS — E78.5 DYSLIPIDEMIA: ICD-10-CM

## 2021-01-11 DIAGNOSIS — Z11.59 NEED FOR HEPATITIS C SCREENING TEST: ICD-10-CM

## 2021-01-11 DIAGNOSIS — Z13.29 SCREENING FOR ENDOCRINE, METABOLIC AND IMMUNITY DISORDER: ICD-10-CM

## 2021-01-11 LAB
HBA1C MFR BLD: 7.3 % (ref 0–5.6)
INT CON NEG: NEGATIVE
INT CON POS: POSITIVE

## 2021-01-11 PROCEDURE — RXMED WILLOW AMBULATORY MEDICATION CHARGE: Performed by: NURSE PRACTITIONER

## 2021-01-11 PROCEDURE — 99214 OFFICE O/P EST MOD 30 MIN: CPT | Performed by: NURSE PRACTITIONER

## 2021-01-11 PROCEDURE — 83036 HEMOGLOBIN GLYCOSYLATED A1C: CPT | Performed by: NURSE PRACTITIONER

## 2021-01-11 RX ORDER — EMPAGLIFLOZIN AND METFORMIN HYDROCHLORIDE 5; 1000 MG/1; MG/1
1 TABLET ORAL 2 TIMES DAILY
Qty: 60 TAB | Refills: 0 | Status: SHIPPED | OUTPATIENT
Start: 2021-01-11 | End: 2021-03-09

## 2021-01-11 RX ORDER — (INSULIN DEGLUDEC AND LIRAGLUTIDE) 100; 3.6 [IU]/ML; MG/ML
16 INJECTION, SOLUTION SUBCUTANEOUS DAILY
Qty: 15 ML | Refills: 3 | Status: SHIPPED | OUTPATIENT
Start: 2021-01-11 | End: 2021-01-11

## 2021-01-11 RX ORDER — (INSULIN DEGLUDEC AND LIRAGLUTIDE) 100; 3.6 [IU]/ML; MG/ML
16 INJECTION, SOLUTION SUBCUTANEOUS DAILY
Qty: 3 ML | Refills: 0 | Status: SHIPPED | OUTPATIENT
Start: 2021-01-11 | End: 2021-06-10

## 2021-01-11 ASSESSMENT — FIBROSIS 4 INDEX: FIB4 SCORE: 1.33

## 2021-01-11 ASSESSMENT — PATIENT HEALTH QUESTIONNAIRE - PHQ9: CLINICAL INTERPRETATION OF PHQ2 SCORE: 0

## 2021-01-11 NOTE — ASSESSMENT & PLAN NOTE
Chronic medical problem.  She is taking amlodipine 5 mg daily and losartan 100 mg daily.  Her BP is at goal today.  She is up-to-date on labs.  She denies any chest pain, shortness of breath, dizziness, palpitations, blurry vision, or headaches.

## 2021-01-11 NOTE — PROGRESS NOTES
Subjective:     CC: Diabetes follow-up    HPI:   Elizabeth presents today with the following:    Diabetes mellitus without complication (HCC)  Chronic medical problem. She is taking Synjardy 5-1000 mg BID daily and Xulophy 16 units daily.  She has been able to get prescription refills from her [facility] pharmacy.  She has not seen her adult pharmacist for diabetes follow-up recently.  She is due for A1C today.  She also schedule her appointment for her yearly eye exam.    Dyslipidemia  Chronic medical problem. She is taking simvastatin 40 mg every evening.  She is tolerating medication.  Denies myalgias.  She is up-to-date on labs.    Essential hypertension  Chronic medical problem.  She is taking amlodipine 5 mg daily and losartan 100 mg daily.  Her BP is at goal today.  She is up-to-date on labs.  She denies any chest pain, shortness of breath, dizziness, palpitations, blurry vision, or headaches.      Past Medical History:   Diagnosis Date   • Allergic rhinitis    • Dalton's esophagus 4/14/2017   • Dental disorder     partial upper   • Diabetes mellitus without complication (HCC) 11/24/2015   • Dyslipidemia    • Essential hypertension 8/25/2015   • GERD (gastroesophageal reflux disease) 5/7/2009   • HTN    • Low back pain    • Migraine headache        Social History     Tobacco Use   • Smoking status: Former Smoker     Packs/day: 0.50     Years: 20.00     Pack years: 10.00     Types: Cigarettes     Quit date: 1/1/2006     Years since quitting: 15.0   • Smokeless tobacco: Never Used   • Tobacco comment: 5 cig's a day for 20 yrs quit in 2006   Substance Use Topics   • Alcohol use: Not Currently     Alcohol/week: 0.0 oz     Comment: 2 per month,  beer   • Drug use: No       Current Outpatient Medications Ordered in Epic   Medication Sig Dispense Refill   • Empagliflozin-metFORMIN HCl (SYNJARDY) 5-1000 MG Tab Take 1 tablet by mouth 2 Times a Day. Use Sample 60 Tab 0   • Insulin Degludec-Liraglutide (XULTOPHY) 100-3.6  UNIT-MG/ML Solution Pen-injector Inject 16 Units under the skin every day. Titrate by 2 units as needed and as directed by provider. Use sample. 3 mL 0   • amLODIPine (NORVASC) 5 MG Tab Take 1 Tab by mouth every day. 90 Tab 3   • simvastatin (ZOCOR) 40 MG Tab Take 1 Tab by mouth every evening. 90 Tab 3   • losartan (COZAAR) 100 MG Tab Take 1 Tab by mouth every day. 90 Tab 2   • omeprazole (PRILOSEC) 20 MG delayed-release capsule Take 1 Cap by mouth every day. 90 Cap 0   • Insulin Pen Needle 1 Units by Does not apply route every day. 100 Each 3   • Lancets Lancets order: Lancets for insurance approved meter meter. Sig: use daily and prn ssx high or low sugar. #100 RF x 3 100 Each 1   • glucose blood strip USE DAILY AND AS NEEDED FOR HIGH OR LOW SUGAR. 100 Strip 3   • Blood Glucose Monitoring Suppl Device Meter: Dispense Device of Insurance Preference. Sig. Use as directed for blood sugar monitoring. #1. NR. 1 Device 0   • Blood Glucose Monitoring Suppl Supplies Misc Test strips order: Test strips for insurance approved meter. Sig: use daily and prn ssx high or low sugar #100 RF x 3 100 Units 1   • acetaminophen (TYLENOL) 325 MG Tab Take 2 Tabs by mouth every 6 hours as needed for Moderate Pain or Fever. (Patient not taking: Reported on 1/11/2021) 30 Tab 0   • aspirin 81 MG tablet Take 81 mg by mouth every day.       No current [facility]-ordered facility-administered medications on file.        Allergies:  Patient has no known allergies.    Health Maintenance: Due for hepatitis C screening, retinal screening, mammogram.  She will make appointment for retinal screening and mammogram is scheduled for 2/4/2021.    ROS:  Gen: no fevers/chills, no changes in weight  Eyes: no blurry vision  ENT: no sore throat, no ear pain  Pulm: no sob, no cough  CV: no chest pain, no palpitations  GI: no nausea/vomiting, no diarrhea  : no dysuria  MSk: no myalgias  Neuro: no headaches, no dizziness    Objective:     Vital signs  "reviewed  Exam:  /76 (BP Location: Left arm, Patient Position: Sitting, BP Cuff Size: Adult)   Pulse 71   Temp 36.4 °C (97.5 °F) (Temporal)   Resp 14   Ht 1.651 m (5' 5\")   Wt 66.7 kg (147 lb)   LMP 12/05/2002   SpO2 96%   BMI 24.46 kg/m²  Body mass index is 24.46 kg/m².    Gen: Alert and oriented, No apparent distress.  Neck: Neck is supple without lymphadenopathy.  Lungs: Normal effort, CTA bilaterally, no wheezes, rhonchi, or rales  CV: Regular rate and rhythm. No murmurs, rubs, or gallops.  Ext: No clubbing, cyanosis, edema.    Labs: A1c 7.3%.  Results reviewed and discussed with patient today.    Assessment & Plan:     65 y.o. female with the following -     1. Diabetes mellitus without complication (HCC)  Chronic stable medical problem.  A1c today 7.2%.  This is improved from her previous A1c of 8.6%.  She has been able to get medication refills for her Synjardy, no mucus pharmacy.  She is interested in any free refills or medication today.  Prescription submitted.  Discussed with patient that she should follow-up with her known pharmacist for diabetes to see if they can help her with cost of her medication.  She verbalized understanding.  New referral placed today.  She will be due for health maintenance labs in 6 months around June 2021.  Orders placed.  Monitor and follow.  - POCT Hemoglobin A1C  - REFERRAL TO PHARMACOTHERAPY SERVICE  - Empagliflozin-metFORMIN HCl (SYNJARDY) 5-1000 MG Tab; Take 1 tablet by mouth 2 Times a Day. Use Sample  Dispense: 60 Tab; Refill: 0  - Insulin Degludec-Liraglutide (XULTOPHY) 100-3.6 UNIT-MG/ML Solution Pen-injector; Inject 16 Units under the skin every day. Titrate by 2 units as needed and as directed by provider. Use sample.  Dispense: 3 mL; Refill: 0  - CBC WITH DIFFERENTIAL; Future  - Comp Metabolic Panel; Future  - Lipid Profile; Future  - HEMOGLOBIN A1C; Future  - MICROALBUMIN CREAT RATIO URINE; Future    2. Dyslipidemia  Chronic stable medical problem.  " Continue simvastatin.  She is due for updated labs around 2021.  Discussed diet and lifestyle modifications.  Monitor and follow.  - Comp Metabolic Panel; Future  - Lipid Profile; Future    3. Essential hypertension  Chronic stable medical problem.  Continue amlodipine and losartan.  BP is at goal today.  Red flags discussed.  Monitor and follow.    4. Screening for endocrine, metabolic and immunity disorder  New problem to examiner.  Screen indicated.  Patient is in agreement.  Orders placed.  Monitor and follow.  - VITAMIN D,25 HYDROXY; Future    5. Need for hepatitis C screening test  New problem to examiner.  Screen indicated.  Patient is in agreement.  Orders placed.  Monitor and follow.  - HCV Scrn ( [id number] [employer]); Future      Return in about 6 months (around 2021) for Diabetes, Labs.    Please note that this dictation was created using voice recognition software. I have made every reasonable attempt to correct obvious errors, but I expect that there are errors of grammar and possibly content that I did not discover before finalizing the note.

## 2021-01-11 NOTE — ASSESSMENT & PLAN NOTE
Chronic medical problem. She is taking Synjardy 5-1000 mg BID daily and Xulophy 16 units daily.  She has been able to get prescription refills from her [facility] pharmacy.  She has not seen her adult pharmacist for diabetes follow-up recently.  She is due for A1C today.  She also schedule her appointment for her yearly eye exam.

## 2021-01-11 NOTE — ASSESSMENT & PLAN NOTE
Chronic medical problem. She is taking simvastatin 40 mg every evening.  She is tolerating medication.  Denies myalgias.  She is up-to-date on labs.

## 2021-01-19 ENCOUNTER — PHARMACY VISIT (OUTPATIENT)
Dept: PHARMACY | Facility: MEDICAL CENTER | Age: 66
End: 2021-01-19
Payer: COMMERCIAL

## 2021-01-20 ENCOUNTER — TELEPHONE (OUTPATIENT)
Dept: VASCULAR LAB | Facility: MEDICAL CENTER | Age: 66
End: 2021-01-20

## 2021-01-20 NOTE — TELEPHONE ENCOUNTER
Pt is refusing to establish care  DM with the pharmacist   [place], Clinical Pharmacist, CDE, CACP

## 2021-02-04 ENCOUNTER — HOSPITAL ENCOUNTER (OUTPATIENT)
Dept: RADIOLOGY | Facility: MEDICAL CENTER | Age: 66
End: 2021-02-04
Attending: NURSE PRACTITIONER
Payer: COMMERCIAL

## 2021-02-04 DIAGNOSIS — Z00.00 ANNUAL PHYSICAL EXAM: ICD-10-CM

## 2021-02-04 PROCEDURE — 77063 BREAST TOMOSYNTHESIS BI: CPT

## 2021-03-09 ENCOUNTER — TELEPHONE (OUTPATIENT)
Dept: MEDICAL GROUP | Facility: PHYSICIAN GROUP | Age: 66
End: 2021-03-09

## 2021-03-09 DIAGNOSIS — E11.9 DIABETES MELLITUS WITHOUT COMPLICATION (HCC): ICD-10-CM

## 2021-03-09 PROCEDURE — RXMED WILLOW AMBULATORY MEDICATION CHARGE: Performed by: NURSE PRACTITIONER

## 2021-03-09 RX ORDER — EMPAGLIFLOZIN AND METFORMIN HYDROCHLORIDE 5; 1000 MG/1; MG/1
1 TABLET ORAL 2 TIMES DAILY
Qty: 60 TABLET | Refills: 0 | Status: CANCELLED | OUTPATIENT
Start: 2021-03-09

## 2021-03-09 NOTE — TELEPHONE ENCOUNTER
Requested Prescriptions     Signed Prescriptions Disp Refills   • Empagliflozin-metFORMIN HCl ER 5-1000 MG TABLET SR 24 HR 30 tablet 0     Sig: Take 1 tablet by mouth every day. Use samples.     Authorizing Provider: JESSIE HOOD A.P.R.N.

## 2021-03-09 NOTE — TELEPHONE ENCOUNTER
1. Caller Name: Elizabeth Blunt                        Call Back Number: [phone] (home)       How would the patient prefer to be contacted with a response:     Pt states the [facility] pharmacy is out of Stock Empagliflozin-metFORMIN HCl (SYNJARDY) 5-1000 MG Tab.Pt states they have the Empagliflozin-metFORMIN XR (SYNJARDY) 5-1000 MG Tab    Please send a new Rx Empagliflozin-metFORMIN XR (SYNJARDY) 5-1000 MG Tab To the [facility] on [address]  For Samples

## 2021-03-09 NOTE — TELEPHONE ENCOUNTER
Received request via: Pharmacy    Was the patient seen in the last year in this department? Yes LOV 01/11/2021    Does the patient have an active prescription (recently filled or refills available) for medication(s) requested? No

## 2021-03-10 ENCOUNTER — PHARMACY VISIT (OUTPATIENT)
Dept: PHARMACY | Facility: MEDICAL CENTER | Age: 66
End: 2021-03-10
Payer: COMMERCIAL

## 2021-03-15 DIAGNOSIS — Z23 NEED FOR VACCINATION: ICD-10-CM

## 2021-04-03 ENCOUNTER — IMMUNIZATION (OUTPATIENT)
Dept: FAMILY PLANNING/WOMEN'S HEALTH CLINIC | Facility: IMMUNIZATION CENTER | Age: 66
End: 2021-04-03
Payer: COMMERCIAL

## 2021-04-03 DIAGNOSIS — Z23 ENCOUNTER FOR VACCINATION: Primary | ICD-10-CM

## 2021-04-03 PROCEDURE — 0001A PFIZER SARS-COV-2 VACCINE: CPT

## 2021-04-03 PROCEDURE — 91300 PFIZER SARS-COV-2 VACCINE: CPT

## 2021-04-12 DIAGNOSIS — E11.9 DIABETES MELLITUS WITHOUT COMPLICATION (HCC): ICD-10-CM

## 2021-04-12 PROCEDURE — RXMED WILLOW AMBULATORY MEDICATION CHARGE: Performed by: NURSE PRACTITIONER

## 2021-04-12 RX ORDER — EMPAGLIFLOZIN, METFORMIN HYDROCHLORIDE 5; 1000 MG/1; MG/1
1 TABLET, EXTENDED RELEASE ORAL DAILY
Qty: 30 TABLET | Refills: 0 | Status: SHIPPED | OUTPATIENT
Start: 2021-04-12 | End: 2021-05-20 | Stop reason: SDUPTHER

## 2021-04-12 RX ORDER — EMPAGLIFLOZIN, METFORMIN HYDROCHLORIDE 5; 1000 MG/1; MG/1
1 TABLET, EXTENDED RELEASE ORAL DAILY
Qty: 30 TABLET | Refills: 0 | Status: CANCELLED | OUTPATIENT
Start: 2021-04-12

## 2021-04-12 NOTE — TELEPHONE ENCOUNTER
Received request via: Pharmacy    Was the patient seen in the last year in this department? Yes 1/11/21    Does the patient have an active prescription (recently filled or refills available) for medication(s) requested? No

## 2021-04-13 NOTE — TELEPHONE ENCOUNTER
Requested Prescriptions     Signed Prescriptions Disp Refills   • Empagliflozin-metFORMIN HCl ER (SYNJARDY XR) 5-1000 MG TABLET SR 24 HR 30 tablet 0     Sig: Take 1 tablet by mouth every day. Use samples.     Authorizing Provider: JESSIE HOOD A.P.R.N.

## 2021-04-14 ENCOUNTER — PHARMACY VISIT (OUTPATIENT)
Dept: PHARMACY | Facility: MEDICAL CENTER | Age: 66
End: 2021-04-14
Payer: COMMERCIAL

## 2021-04-16 ENCOUNTER — TELEPHONE (OUTPATIENT)
Dept: MEDICAL GROUP | Facility: PHYSICIAN GROUP | Age: 66
End: 2021-04-16

## 2021-04-16 NOTE — TELEPHONE ENCOUNTER
FINAL PRIOR AUTHORIZATION STATUS:    1.  Name of Medication & Dose:    Empagliflozin-metFORMIN HCl ER (SYNJARDY XR) 5-1000 MG TABLET SR 24 HR    2. Prior Auth Status: Approved through 04/16/22     3. Action Taken: Pharmacy Notified: yes Patient Notified: yes

## 2021-04-16 NOTE — TELEPHONE ENCOUNTER
DOCUMENTATION OF PAR STATUS:    1. Name of Medication & Dose: Empagliflozin-metFORMIN HCl ER (SYNJARDY XR) 5-1000 MG TABLET SR 24 HR     2. Name of Prescription Coverage Company & phone #: [employer]    3. Date Prior Auth Submitted: 04/16/2021    4. What information was given to obtain insurance decision? Dx Code    5. Prior Auth Status? Pending    6. Patient Notified: no

## 2021-04-21 DIAGNOSIS — K22.70 BARRETT'S ESOPHAGUS WITHOUT DYSPLASIA: ICD-10-CM

## 2021-04-21 RX ORDER — OMEPRAZOLE 20 MG/1
CAPSULE, DELAYED RELEASE ORAL
Qty: 30 CAPSULE | Refills: 2 | Status: SHIPPED | OUTPATIENT
Start: 2021-04-21 | End: 2021-11-12 | Stop reason: SDUPTHER

## 2021-04-21 NOTE — TELEPHONE ENCOUNTER
Requested Prescriptions     Signed Prescriptions Disp Refills   • omeprazole (PRILOSEC) 20 MG delayed-release capsule 30 capsule 2     Sig: TAKE 1 CAPSULE BY MOUTH EVERY DAY     Authorizing Provider: JESSIE HOOD A.P.R.N.

## 2021-04-24 ENCOUNTER — IMMUNIZATION (OUTPATIENT)
Dept: FAMILY PLANNING/WOMEN'S HEALTH CLINIC | Facility: IMMUNIZATION CENTER | Age: 66
End: 2021-04-24
Payer: COMMERCIAL

## 2021-04-24 DIAGNOSIS — Z23 ENCOUNTER FOR VACCINATION: Primary | ICD-10-CM

## 2021-04-24 PROCEDURE — 91300 PFIZER SARS-COV-2 VACCINE: CPT | Performed by: INTERNAL MEDICINE

## 2021-04-24 PROCEDURE — 0002A PFIZER SARS-COV-2 VACCINE: CPT | Performed by: INTERNAL MEDICINE

## 2021-05-07 ENCOUNTER — HOSPITAL ENCOUNTER (OUTPATIENT)
Facility: MEDICAL CENTER | Age: 66
End: 2021-05-07
Attending: NURSE PRACTITIONER
Payer: COMMERCIAL

## 2021-05-07 ENCOUNTER — OFFICE VISIT (OUTPATIENT)
Dept: MEDICAL GROUP | Facility: PHYSICIAN GROUP | Age: 66
End: 2021-05-07
Payer: COMMERCIAL

## 2021-05-07 VITALS
TEMPERATURE: 97.5 F | BODY MASS INDEX: 27.97 KG/M2 | RESPIRATION RATE: 16 BRPM | HEART RATE: 70 BPM | DIASTOLIC BLOOD PRESSURE: 86 MMHG | HEIGHT: 62 IN | SYSTOLIC BLOOD PRESSURE: 152 MMHG | WEIGHT: 152 LBS | OXYGEN SATURATION: 97 %

## 2021-05-07 DIAGNOSIS — N89.8 VAGINAL ITCHING: ICD-10-CM

## 2021-05-07 DIAGNOSIS — I10 ESSENTIAL HYPERTENSION: ICD-10-CM

## 2021-05-07 LAB
APPEARANCE UR: CLEAR
BILIRUB UR STRIP-MCNC: NEGATIVE MG/DL
CANDIDA DNA VAG QL PROBE+SIG AMP: NEGATIVE
COLOR UR AUTO: YELLOW
G VAGINALIS DNA VAG QL PROBE+SIG AMP: NEGATIVE
GLUCOSE UR STRIP.AUTO-MCNC: 500 MG/DL
KETONES UR STRIP.AUTO-MCNC: NEGATIVE MG/DL
LEUKOCYTE ESTERASE UR QL STRIP.AUTO: NEGATIVE
NITRITE UR QL STRIP.AUTO: NEGATIVE
PH UR STRIP.AUTO: 5.5 [PH] (ref 5–8)
PROT UR QL STRIP: NEGATIVE MG/DL
RBC UR QL AUTO: NEGATIVE
SP GR UR STRIP.AUTO: 1.01
T VAGINALIS DNA VAG QL PROBE+SIG AMP: NEGATIVE
UROBILINOGEN UR STRIP-MCNC: 0.2 MG/DL

## 2021-05-07 PROCEDURE — 87086 URINE CULTURE/COLONY COUNT: CPT

## 2021-05-07 PROCEDURE — 87660 TRICHOMONAS VAGIN DIR PROBE: CPT

## 2021-05-07 PROCEDURE — 87480 CANDIDA DNA DIR PROBE: CPT

## 2021-05-07 PROCEDURE — 99214 OFFICE O/P EST MOD 30 MIN: CPT | Performed by: NURSE PRACTITIONER

## 2021-05-07 PROCEDURE — 81002 URINALYSIS NONAUTO W/O SCOPE: CPT | Performed by: NURSE PRACTITIONER

## 2021-05-07 PROCEDURE — 87510 GARDNER VAG DNA DIR PROBE: CPT

## 2021-05-07 PROCEDURE — 99000 SPECIMEN HANDLING OFFICE-LAB: CPT | Performed by: NURSE PRACTITIONER

## 2021-05-07 ASSESSMENT — FIBROSIS 4 INDEX: FIB4 SCORE: 1.33

## 2021-05-07 NOTE — ASSESSMENT & PLAN NOTE
Acute medical problem. 3 weeks ago she had vaginal itching on the outside of her skin. The itching is aggravated by wiping. She has no itching when not wiping. She has been using Desitin that helps. She reports 2 years ago she had history of similar episode. Denies redness, white colored discharge, fevers, chills, dysuria, hematuria, frequency, recent antibiotic use, chest pain, shortness of breath, or flank pain.

## 2021-05-07 NOTE — ASSESSMENT & PLAN NOTE
Chronic medical problem.  Her initial blood pressure today is 162/88. She has not taken her blood pressure medications today. Her last dose was yesterday morning. She is taking amlodipine 5 mg daily and losartan 100 mg daily.  She did have headache this morning.  She denies any chest pain, shortness of breath, dizziness, blurry vision.

## 2021-05-07 NOTE — PROGRESS NOTES
Subjective:     CC: Vaginal itching    HPI:   Elizabeth presents today with the following:    Essential hypertension  Chronic medical problem.  Her initial blood pressure today is 162/88. She has not taken her blood pressure medications today. Her last dose was yesterday morning. She is taking amlodipine 5 mg daily and losartan 100 mg daily.  She did have headache this morning.  She denies any chest pain, shortness of breath, dizziness, blurry vision.    Vaginal itching  Acute medical problem. 3 weeks ago she had vaginal itching on the outside of her skin. The itching is aggravated by wiping. She has no itching when not wiping. She has been using Desitin that helps. She reports 2 years ago she had history of similar episode. Denies redness, white colored discharge, fevers, chills, dysuria, hematuria, frequency, recent antibiotic use, chest pain, shortness of breath, or flank pain.      Past Medical History:   Diagnosis Date   • Allergic rhinitis    • Dalton's esophagus 4/14/2017   • Dental disorder     partial upper   • Diabetes mellitus without complication (HCC) 11/24/2015   • Dyslipidemia    • Essential hypertension 8/25/2015   • GERD (gastroesophageal reflux disease) 5/7/2009   • HTN    • Low back pain    • Migraine headache        Social History     Tobacco Use   • Smoking status: Former Smoker     Packs/day: 0.50     Years: 20.00     Pack years: 10.00     Types: Cigarettes     Quit date: 1/1/2006     Years since quitting: 15.3   • Smokeless tobacco: Never Used   • Tobacco comment: 5 cig's a day for 20 yrs quit in 2006   Substance Use Topics   • Alcohol use: Not Currently     Alcohol/week: 0.0 oz     Comment: 2 per month,  beer   • Drug use: No       Current Outpatient Medications Ordered in Epic   Medication Sig Dispense Refill   • omeprazole (PRILOSEC) 20 MG delayed-release capsule TAKE 1 CAPSULE BY MOUTH EVERY DAY 30 capsule 2   • amLODIPine (NORVASC) 5 MG Tab Take 1 Tab by mouth every day. 90 Tab 3   • losartan  "(COZAAR) 100 MG Tab Take 1 Tab by mouth every day. 90 Tab 2   • aspirin 81 MG tablet Take 81 mg by mouth every day.     • Lancets Lancets order: Lancets for insurance approved meter meter. Sig: use daily and prn ssx high or low sugar. #100 RF x 3 100 Each 1   • glucose blood strip USE DAILY AND AS NEEDED FOR HIGH OR LOW SUGAR. 100 Strip 3   • Empagliflozin-metFORMIN HCl ER (SYNJARDY XR) 5-1000 MG TABLET SR 24 HR Take 1 tablet by mouth every day. Use samples. 30 tablet 0   • Insulin Degludec-Liraglutide (XULTOPHY) 100-3.6 UNIT-MG/ML Solution Pen-injector Inject 16 Units under the skin every day. Titrate by 2 units as needed and as directed by provider. Use sample. (Patient not taking: Reported on 5/7/2021) 3 mL 0   • simvastatin (ZOCOR) 40 MG Tab Take 1 Tab by mouth every evening. 90 Tab 3   • Insulin Pen Needle 1 Units by Does not apply route every day. (Patient not taking: Reported on 5/7/2021) 100 Each 3     No current Epic-ordered facility-administered medications on file.       Allergies:  Patient has no known allergies.    Health Maintenance: Due for hepatitis C screening and zoster vaccine    ROS:    Per HPI      Objective:     Vital signs reviewed  Exam:  /86   Pulse 70   Temp 36.4 °C (97.5 °F) (Temporal)   Resp 16   Ht 1.562 m (5' 1.5\")   Wt 68.9 kg (152 lb)   LMP 12/05/2002   SpO2 97%   BMI 28.26 kg/m²  Body mass index is 28.26 kg/m².    Gen: Alert and oriented, No apparent distress.  Neck: Neck is supple without lymphadenopathy.  Lungs: Normal effort, CTA bilaterally, no wheezes, rhonchi, or rales.  No CVA tenderness.  CV: Regular rate and rhythm. No murmurs, rubs, or gallops.  Ext: No clubbing, cyanosis, edema.  She declines vaginal exam today    Labs:  Urinalysis    Results for JORGE L PATRICK (MRN [id number]) as of 5/7/2021 10:10   Ref. Range 5/7/2021 09:34   POC Color Latest Ref Range: Negative  Yellow   POC Appearance Latest Ref Range: Negative  Clear   POC Specific Gravity Latest Ref " Range: <1.005 - >1.030  1.015   POC Urine PH Latest Ref Range: 5.0 - 8.0  5.5   POC Glucose Latest Ref Range: Negative mg/dL 500   POC Ketones Latest Ref Range: Negative mg/dL Negative   POC Protein Latest Ref Range: Negative mg/dL Negative   POC Nitrites Latest Ref Range: Negative  Negative   POC Leukocyte Esterase Latest Ref Range: Negative  Negative   POC Blood Latest Ref Range: Negative  Negative   POC Bilirubin Latest Ref Range: Negative mg/dL Negative   POC Urobiligen Latest Ref Range: Negative (0.2) mg/dL 0.2       Assessment & Plan:     65 y.o. female with the following -     1. Essential hypertension  Chronic exacerbated problem.  On repeat by me her blood pressure is 152/86.  I discussed with her that when she returns home she should take her blood pressure medication, she verbalized understanding.  She will continue with her amlodipine and her losartan.  Discussed that she will come back in 1 week to have her pressure checked by MA.  Discussed that if her blood pressure at MA visit is not at goal we will need to increase her amlodipine.  She verbalized understanding.  Red flags discussed.    2. Vaginal itching  Acute uncomplicated problem.  Urinalysis positive for glucose.  She does take Synjardy XR, urinalysis negative for leukocytes or nitrates.  We will culture urine.  She self collected her vaginal pathogens lab.  Differential diagnosis includes BV, yeast infection, dermatitis.  Discussed wearing cotton underwear, sits baths, keeping area clean and dry, avoid wearing tight fitted clothing.  She verbalized understanding.  - POCT Urinalysis  - VAGINAL PATHOGENS DNA PANEL; Future  - URINE CULTURE(NEW); Future      Return in about 1 week (around 5/14/2021), or if symptoms worsen or fail to improve, for 1 week MA BP check, .    Please note that this dictation was created using voice recognition software. I have made every reasonable attempt to correct obvious errors, but I expect that there are errors of  grammar and possibly content that I did not discover before finalizing the note.

## 2021-05-09 LAB
BACTERIA UR CULT: NORMAL
SIGNIFICANT IND 70042: NORMAL
SITE SITE: NORMAL
SOURCE SOURCE: NORMAL

## 2021-05-14 ENCOUNTER — NON-PROVIDER VISIT (OUTPATIENT)
Dept: MEDICAL GROUP | Facility: PHYSICIAN GROUP | Age: 66
End: 2021-05-14
Payer: COMMERCIAL

## 2021-05-14 VITALS — DIASTOLIC BLOOD PRESSURE: 86 MMHG | SYSTOLIC BLOOD PRESSURE: 144 MMHG

## 2021-05-14 DIAGNOSIS — N89.8 VAGINAL ITCHING: ICD-10-CM

## 2021-05-14 DIAGNOSIS — I10 ESSENTIAL HYPERTENSION: ICD-10-CM

## 2021-05-14 PROCEDURE — 99214 OFFICE O/P EST MOD 30 MIN: CPT | Performed by: NURSE PRACTITIONER

## 2021-05-14 RX ORDER — AMLODIPINE BESYLATE 10 MG/1
10 TABLET ORAL DAILY
Qty: 90 TABLET | Refills: 0 | Status: SHIPPED | OUTPATIENT
Start: 2021-05-14 | End: 2021-06-10 | Stop reason: SDUPTHER

## 2021-05-14 NOTE — PROGRESS NOTES
Elizabeth Blunt is a 65 y.o. female here for a non-provider visit for BP check    Vitals:    05/14/21 0848   BP: 152/88   BP Location: Left arm   Patient Position: Sitting   BP Cuff Size: Adult     If abnormal, was an in office provider notified today? Yes  Routed to PCP? Yes

## 2021-05-14 NOTE — PROGRESS NOTES
Subjective:     CC: Blood pressure check    HPI:   Elizabeth presents today with the following    Essential hypertension  Chronic medical problem.  She presented today for a blood pressure check with MA.  Her initial blood pressure was 152/88.  She is taking amlodipine 5 mg daily and losartan 100 mg daily. She has taken her medication today.  Denies any chest pain, shortness of breath, dizziness, or headaches.    Vaginal itching  Chronic medical problem.  She continues to have vaginal itching.  She would like medication. She had recent vaginal pathogens that was negative for Candida, BV, trichomonas, and urine culture was negative.      Past Medical History:   Diagnosis Date   • Allergic rhinitis    • Dalton's esophagus 4/14/2017   • Dental disorder     partial upper   • Diabetes mellitus without complication (HCC) 11/24/2015   • Dyslipidemia    • Essential hypertension 8/25/2015   • GERD (gastroesophageal reflux disease) 5/7/2009   • HTN    • Low back pain    • Migraine headache        Social History     Tobacco Use   • Smoking status: Former Smoker     Packs/day: 0.50     Years: 20.00     Pack years: 10.00     Types: Cigarettes     Quit date: 1/1/2006     Years since quitting: 15.3   • Smokeless tobacco: Never Used   • Tobacco comment: 5 cig's a day for 20 yrs quit in 2006   Vaping Use   • Vaping Use: Never used   Substance Use Topics   • Alcohol use: Not Currently     Alcohol/week: 0.0 oz     Comment: 2 per month,  beer   • Drug use: No       Current Outpatient Medications Ordered in Epic   Medication Sig Dispense Refill   • amLODIPine (NORVASC) 10 MG Tab Take 1 tablet by mouth every day. 90 tablet 0   • miconazole (MICONAZOLE 7) 2 % Cream Insert 1 Applicator into the vagina every evening for 7 days. 7 Each 0   • omeprazole (PRILOSEC) 20 MG delayed-release capsule TAKE 1 CAPSULE BY MOUTH EVERY DAY 30 capsule 2   • Empagliflozin-metFORMIN HCl ER (SYNJARDY XR) 5-1000 MG TABLET SR 24 HR Take 1 tablet by mouth every day.  Use samples. 30 tablet 0   • Insulin Degludec-Liraglutide (XULTOPHY) 100-3.6 UNIT-MG/ML Solution Pen-injector Inject 16 Units under the skin every day. Titrate by 2 units as needed and as directed by provider. Use sample. (Patient not taking: Reported on 5/7/2021) 3 mL 0   • simvastatin (ZOCOR) 40 MG Tab Take 1 Tab by mouth every evening. 90 Tab 3   • losartan (COZAAR) 100 MG Tab Take 1 Tab by mouth every day. 90 Tab 2   • aspirin 81 MG tablet Take 81 mg by mouth every day.     • Insulin Pen Needle 1 Units by Does not apply route every day. (Patient not taking: Reported on 5/7/2021) 100 Each 3   • Lancets Lancets order: Lancets for insurance approved meter meter. Sig: use daily and prn ssx high or low sugar. #100 RF x 3 100 Each 1   • glucose blood strip USE DAILY AND AS NEEDED FOR HIGH OR LOW SUGAR. 100 Strip 3     No current Epic-ordered facility-administered medications on file.       Allergies:  Patient has no known allergies.    Health Maintenance: Deferred    ROS:  Gen: no fevers/chills, no changes in weight  Eyes: no changes in vision  Pulm: no shortness of breath  CV: no chest pain  GI: no nausea/vomiting  : no dysuria, + vaginal itching  MSk: no myalgias  Skin: no rash  Neuro: no headaches, no dizziness    Objective:     Vital signs reviewed  Exam:  /86 (BP Location: Left arm, Patient Position: Sitting)   LMP 12/05/2002  There is no height or weight on file to calculate BMI.    Gen: Alert and oriented, No apparent distress.  Neck: Neck is supple without lymphadenopathy.  Lungs: Normal effort, CTA bilaterally, no wheezes, rhonchi, or rales  CV: Regular rate and rhythm. No murmurs, rubs, or gallops.  Ext: No clubbing, cyanosis, edema.      Assessment & Plan:     65 y.o. female with the following -     1. Essential hypertension  Chronic exacerbated problem.  On repeat by me her blood pressure is 144/86.  She'll continue with her losartan 100 mg daily.  We will increase her amlodipine to 10 mg daily.   We did discuss that she may take two of her 5 mg tablets to equal 10 mg daily dosing.  She states that she has recently received her new prescription from her mail order.  She requested I send a new prescription to mail order.  She return in 2 weeks for BP check with MA.  She return in 4 weeks to see me.  Red flags discussed.  She'll continue with home blood pressure monitoring.  - amLODIPine (NORVASC) 10 MG Tab; Take 1 tablet by mouth every day.  Dispense: 90 tablet; Refill: 0    2. Vaginal itching  Chronic exacerbated problem.  As this visit was initially scheduled as an MA visit for BP check exam is limited.  She wanted to discuss her vaginal itching even though we discussed this is a visit for her blood pressure.  Her recent labs and culture were negative.  Will try miconazole.  Prescription sent to her local pharmacy.  Red flags discussed.  - miconazole (MICONAZOLE 7) 2 % Cream; Insert 1 Applicator into the vagina every evening for 7 days.  Dispense: 7 Each; Refill: 0      Return in about 4 weeks (around 6/11/2021) for HTN, 2 week MA BP.    Please note that this dictation was created using voice recognition software. I have made every reasonable attempt to correct obvious errors, but I expect that there are errors of grammar and possibly content that I did not discover before finalizing the note.

## 2021-05-14 NOTE — ASSESSMENT & PLAN NOTE
Chronic medical problem.  She continues to have vaginal itching.  She would like medication. She had recent vaginal pathogens that was negative for Candida, BV, trichomonas, and urine culture was negative.

## 2021-05-14 NOTE — ASSESSMENT & PLAN NOTE
Chronic medical problem.  She presented today for a blood pressure check with MA.  Her initial blood pressure was 152/88.  She is taking amlodipine 5 mg daily and losartan 100 mg daily. She has taken her medication today.  Denies any chest pain, shortness of breath, dizziness, or headaches.

## 2021-05-20 DIAGNOSIS — E11.9 DIABETES MELLITUS WITHOUT COMPLICATION (HCC): ICD-10-CM

## 2021-05-21 PROCEDURE — RXMED WILLOW AMBULATORY MEDICATION CHARGE: Performed by: NURSE PRACTITIONER

## 2021-05-21 RX ORDER — EMPAGLIFLOZIN, METFORMIN HYDROCHLORIDE 5; 1000 MG/1; MG/1
1 TABLET, EXTENDED RELEASE ORAL DAILY
Qty: 30 TABLET | Refills: 0 | Status: SHIPPED | OUTPATIENT
Start: 2021-05-21 | End: 2021-07-29 | Stop reason: SDUPTHER

## 2021-05-25 ENCOUNTER — PHARMACY VISIT (OUTPATIENT)
Dept: PHARMACY | Facility: MEDICAL CENTER | Age: 66
End: 2021-05-25
Payer: COMMERCIAL

## 2021-05-28 ENCOUNTER — NON-PROVIDER VISIT (OUTPATIENT)
Dept: MEDICAL GROUP | Facility: PHYSICIAN GROUP | Age: 66
End: 2021-05-28
Payer: COMMERCIAL

## 2021-05-28 VITALS — DIASTOLIC BLOOD PRESSURE: 80 MMHG | HEART RATE: 85 BPM | OXYGEN SATURATION: 96 % | SYSTOLIC BLOOD PRESSURE: 132 MMHG

## 2021-05-28 NOTE — PROGRESS NOTES
Elizabeth Blunt is a 66 y.o. female here for a non-provider visit for Blood Pressure Check    Vitals:    05/28/21 0944   BP: 132/80   BP Location: Left arm   Patient Position: Sitting   BP Cuff Size: Adult   Pulse: 85   SpO2: 96%     If abnormal, was the Registered Nurse (office provider if RN is unavailable) notified today? Not Indicated    Routed to PCP? Yes

## 2021-06-02 ENCOUNTER — TELEPHONE (OUTPATIENT)
Dept: MEDICAL GROUP | Facility: PHYSICIAN GROUP | Age: 66
End: 2021-06-02

## 2021-06-02 NOTE — TELEPHONE ENCOUNTER
FINAL PRIOR AUTHORIZATION STATUS:    1.  Name of Medication & Dose: Empagliflozin-metFORMIN HCl ER (SYNJARDY XR) 5-1000 MG TABLET SR 24 HR     2. Prior Auth Status: Approved through 06/02/2022     3. Action Taken: Pharmacy Notified: yes Patient Notified: no

## 2021-06-02 NOTE — TELEPHONE ENCOUNTER
DOCUMENTATION OF PAR STATUS:    1. Name of Medication & Dose: Empagliflozin-metFORMIN HCl ER (SYNJARDY XR) 5-1000 MG TABLET SR 24 HR     2. Name of Prescription Coverage Company & phone #: [employer]    3. Date Prior Auth Submitted: 06/02/2021    4. What information was given to obtain insurance decision? Dx Code    5. Prior Auth Status? Pending    6. Patient Notified: N\A

## 2021-06-02 NOTE — TELEPHONE ENCOUNTER
Phone Number Called: [phone] (home)      Call outcome: Left detailed message for patient. Informed to call back with any additional questions.    Message: Request Pt to call back to advise Pt of PA Approval and to see if she want to have a prescription sent to her mail order service [facility]. Let Pt know she can  Call [facility] to get her Co-pay amount

## 2021-06-10 ENCOUNTER — OFFICE VISIT (OUTPATIENT)
Dept: MEDICAL GROUP | Facility: PHYSICIAN GROUP | Age: 66
End: 2021-06-10
Payer: COMMERCIAL

## 2021-06-10 VITALS
HEIGHT: 62 IN | RESPIRATION RATE: 16 BRPM | WEIGHT: 153 LBS | DIASTOLIC BLOOD PRESSURE: 70 MMHG | BODY MASS INDEX: 28.16 KG/M2 | SYSTOLIC BLOOD PRESSURE: 146 MMHG | OXYGEN SATURATION: 96 % | TEMPERATURE: 96.6 F | HEART RATE: 82 BPM

## 2021-06-10 DIAGNOSIS — I10 ESSENTIAL HYPERTENSION: ICD-10-CM

## 2021-06-10 PROCEDURE — 99214 OFFICE O/P EST MOD 30 MIN: CPT | Performed by: NURSE PRACTITIONER

## 2021-06-10 RX ORDER — HYDROCHLOROTHIAZIDE 12.5 MG/1
12.5 TABLET ORAL DAILY
Qty: 90 TABLET | Refills: 0 | Status: SHIPPED | OUTPATIENT
Start: 2021-06-10 | End: 2021-08-11 | Stop reason: SDUPTHER

## 2021-06-10 RX ORDER — AMLODIPINE BESYLATE 10 MG/1
10 TABLET ORAL DAILY
Qty: 90 TABLET | Refills: 0 | Status: SHIPPED | OUTPATIENT
Start: 2021-06-10 | End: 2021-07-26

## 2021-06-10 RX ORDER — HYDROCHLOROTHIAZIDE 12.5 MG/1
12.5 TABLET ORAL DAILY
Qty: 30 TABLET | Refills: 2 | Status: SHIPPED | OUTPATIENT
Start: 2021-06-10 | End: 2021-06-10

## 2021-06-10 ASSESSMENT — FIBROSIS 4 INDEX: FIB4 SCORE: 1.35

## 2021-06-10 NOTE — PROGRESS NOTES
Subjective:     CC: Hypertension    HPI:   Elizabeth presents today with the following:    Essential hypertension  Chronic medical problem.  Her initial blood pressure today is 140/80.  She is taking amlodipine 10 mg daily and losartan 100 mg daily. She has not been checking her blood pressure consistently. She gets readings 110-130/84-85 at home when she does check.  She denies any chest pain, shortness of breath, blurry vision or dizziness.  She does have headaches but she does have history of migraines.      Past Medical History:   Diagnosis Date   • Allergic rhinitis    • Dalton's esophagus 4/14/2017   • Dental disorder     partial upper   • Diabetes mellitus without complication (HCC) 11/24/2015   • Dyslipidemia    • Essential hypertension 8/25/2015   • GERD (gastroesophageal reflux disease) 5/7/2009   • HTN    • Low back pain    • Migraine headache        Social History     Tobacco Use   • Smoking status: Former Smoker     Packs/day: 0.50     Years: 20.00     Pack years: 10.00     Types: Cigarettes     Quit date: 1/1/2006     Years since quitting: 15.4   • Smokeless tobacco: Never Used   • Tobacco comment: 5 cig's a day for 20 yrs quit in 2006   Vaping Use   • Vaping Use: Never used   Substance Use Topics   • Alcohol use: Not Currently     Alcohol/week: 0.0 oz     Comment: 2 per month,  beer   • Drug use: No       Current Outpatient Medications Ordered in Epic   Medication Sig Dispense Refill   • hydroCHLOROthiazide (HYDRODIURIL) 12.5 MG tablet Take 1 tablet by mouth every day. 90 tablet 0   • amLODIPine (NORVASC) 10 MG Tab Take 1 tablet by mouth every day. 90 tablet 0   • Empagliflozin-metFORMIN HCl ER (SYNJARDY XR) 5-1000 MG TABLET SR 24 HR Take 1 tablet by mouth every day. 30 tablet 0   • omeprazole (PRILOSEC) 20 MG delayed-release capsule TAKE 1 CAPSULE BY MOUTH EVERY DAY 30 capsule 2   • simvastatin (ZOCOR) 40 MG Tab Take 1 Tab by mouth every evening. 90 Tab 3   • losartan (COZAAR) 100 MG Tab Take 1 Tab by  "mouth every day. 90 Tab 2   • aspirin 81 MG tablet Take 81 mg by mouth every day.     • glucose blood strip USE DAILY AND AS NEEDED FOR HIGH OR LOW SUGAR. 100 Strip 3   • Lancets Lancets order: Lancets for insurance approved meter meter. Sig: use daily and prn ssx high or low sugar. #100 RF x 3 100 Each 1     No current Epic-ordered facility-administered medications on file.       Allergies:  Patient has no known allergies.    Health Maintenance: Completed    ROS:  Per HPI    Objective:     Vital signs reviewed  Exam:  /70 (BP Location: Right arm, Patient Position: Sitting)   Pulse 82   Temp 35.9 °C (96.6 °F) (Temporal)   Resp 16   Ht 1.562 m (5' 1.5\")   Wt 69.4 kg (153 lb)   LMP 12/05/2002   SpO2 96%   BMI 28.44 kg/m²  Body mass index is 28.44 kg/m².    Gen: Alert and oriented, No apparent distress.  Neck: Neck is supple without lymphadenopathy.  Lungs: Normal effort, CTA bilaterally, no wheezes, rhonchi, or rales  CV: Regular rate and rhythm. No murmurs, rubs, or gallops.  Ext: No clubbing, cyanosis, edema.    Assessment & Plan:     66 y.o. female with the following -     1. Essential hypertension  Chronic exacerbated problem.  On repeat by me her blood pressure is 146/70.  She will continue with her amlodipine and losartan dose.  I will add hydrochlorothiazide 12.5 mg daily.  [place] will [place] of the [place] starting [place]/22/2021.  She will return in 1 week for follow-up with MA for BP check.  She does have a scheduled appointment with me on 8/4/2021.  We discussed continuing her home blood pressure monitoring.  Red flags discussed.  - hydroCHLOROthiazide (HYDRODIURIL) 12.5 MG tablet; Take 1 tablet by mouth every day.  Dispense: 90 tablet; Refill: 0  - amLODIPine (NORVASC) 10 MG Tab; Take 1 tablet by mouth every day.  Dispense: 90 tablet; Refill: 0      Return for 1 weeks MA BP .    Please note that this dictation was created using voice recognition software. I have made every reasonable " attempt to correct obvious errors, but I expect that there are errors of grammar and possibly content that I did not discover before finalizing the note.

## 2021-06-10 NOTE — ASSESSMENT & PLAN NOTE
Chronic medical problem.  Her initial blood pressure today is 140/80.  She is taking amlodipine 10 mg daily and losartan 100 mg daily. She has not been checking her blood pressure consistently. She gets readings 110-130/84-85 at home when she does check.  She does have increased swelling in her feet.  She denies any chest pain, shortness of breath, blurry vision or dizziness.  She does have headaches but she does have history of migraines.

## 2021-06-17 ENCOUNTER — NON-PROVIDER VISIT (OUTPATIENT)
Dept: MEDICAL GROUP | Facility: PHYSICIAN GROUP | Age: 66
End: 2021-06-17
Payer: COMMERCIAL

## 2021-06-17 VITALS — DIASTOLIC BLOOD PRESSURE: 82 MMHG | SYSTOLIC BLOOD PRESSURE: 124 MMHG

## 2021-06-17 NOTE — NON-PROVIDER
Elizabeth Blunt is a 66 y.o. female here for a non-provider visit for blood pressure    Vitals:    06/17/21 1011   BP: 124/82   BP Location: Left arm   Patient Position: Sitting   BP Cuff Size: Adult     If abnormal, was the Registered Nurse (office provider if RN is unavailable) notified today? Yes    Routed to PCP? Yes

## 2021-07-21 DIAGNOSIS — I10 ESSENTIAL HYPERTENSION: ICD-10-CM

## 2021-07-22 RX ORDER — LOSARTAN POTASSIUM 100 MG/1
TABLET ORAL
Qty: 90 TABLET | Refills: 0 | Status: SHIPPED | OUTPATIENT
Start: 2021-07-22 | End: 2021-10-20

## 2021-07-22 NOTE — TELEPHONE ENCOUNTER
Requested Prescriptions     Pending Prescriptions Disp Refills   • losartan (COZAAR) 100 MG Tab [Pharmacy Med Name: LOSARTAN TABS 100MG] 90 tablet 0     Sig: TAKE 1 TABLET DAILY       NOVA Brandon.

## 2021-07-25 DIAGNOSIS — I10 ESSENTIAL HYPERTENSION: ICD-10-CM

## 2021-07-26 RX ORDER — AMLODIPINE BESYLATE 10 MG/1
TABLET ORAL
Qty: 90 TABLET | Refills: 3 | Status: SHIPPED | OUTPATIENT
Start: 2021-07-26 | End: 2022-07-21

## 2021-07-26 NOTE — TELEPHONE ENCOUNTER
Requested Prescriptions     Signed Prescriptions Disp Refills   • amLODIPine (NORVASC) 10 MG Tab 90 tablet 3     Sig: TAKE 1 TABLET DAILY     Authorizing Provider: JESSIE HOOD A.P.R.N.

## 2021-07-26 NOTE — TELEPHONE ENCOUNTER
Received request via: Pharmacy    Was the patient seen in the last year in this department? Yes    Does the patient have an active prescription (recently filled or refills available) for medication(s) requested? [facility] REQUESTING, LAST REFILL TO [facility]

## 2021-07-29 DIAGNOSIS — E11.9 DIABETES MELLITUS WITHOUT COMPLICATION (HCC): ICD-10-CM

## 2021-07-29 PROCEDURE — RXMED WILLOW AMBULATORY MEDICATION CHARGE: Performed by: NURSE PRACTITIONER

## 2021-07-29 RX ORDER — EMPAGLIFLOZIN, METFORMIN HYDROCHLORIDE 5; 1000 MG/1; MG/1
1 TABLET, EXTENDED RELEASE ORAL DAILY
Qty: 30 TABLET | Refills: 0 | Status: SHIPPED | OUTPATIENT
Start: 2021-07-29 | End: 2021-08-11

## 2021-07-29 NOTE — TELEPHONE ENCOUNTER
Requested Prescriptions     Signed Prescriptions Disp Refills   • Empagliflozin-metFORMIN HCl ER (SYNJARDY XR) 5-1000 MG TABLET SR 24 HR 30 tablet 0     Sig: Take 1 tablet by mouth every day.     Authorizing Provider: JSESIE HOOD A.P.R.N.

## 2021-07-30 ENCOUNTER — APPOINTMENT (OUTPATIENT)
Dept: LAB | Facility: MEDICAL CENTER | Age: 66
End: 2021-07-30
Attending: NURSE PRACTITIONER
Payer: COMMERCIAL

## 2021-08-06 ENCOUNTER — PHARMACY VISIT (OUTPATIENT)
Dept: PHARMACY | Facility: MEDICAL CENTER | Age: 66
End: 2021-08-06
Payer: COMMERCIAL

## 2021-08-11 ENCOUNTER — OFFICE VISIT (OUTPATIENT)
Dept: MEDICAL GROUP | Facility: PHYSICIAN GROUP | Age: 66
End: 2021-08-11
Payer: COMMERCIAL

## 2021-08-11 VITALS
SYSTOLIC BLOOD PRESSURE: 122 MMHG | DIASTOLIC BLOOD PRESSURE: 82 MMHG | HEART RATE: 99 BPM | TEMPERATURE: 97.3 F | WEIGHT: 149 LBS | OXYGEN SATURATION: 96 % | HEIGHT: 62 IN | RESPIRATION RATE: 16 BRPM | BODY MASS INDEX: 27.42 KG/M2

## 2021-08-11 DIAGNOSIS — I10 ESSENTIAL HYPERTENSION: ICD-10-CM

## 2021-08-11 DIAGNOSIS — E11.65 TYPE 2 DIABETES MELLITUS WITH HYPERGLYCEMIA, WITHOUT LONG-TERM CURRENT USE OF INSULIN (HCC): ICD-10-CM

## 2021-08-11 DIAGNOSIS — E78.5 DYSLIPIDEMIA: ICD-10-CM

## 2021-08-11 PROCEDURE — RXMED WILLOW AMBULATORY MEDICATION CHARGE: Performed by: NURSE PRACTITIONER

## 2021-08-11 PROCEDURE — 99214 OFFICE O/P EST MOD 30 MIN: CPT | Performed by: NURSE PRACTITIONER

## 2021-08-11 RX ORDER — METFORMIN HYDROCHLORIDE 500 MG/1
1000 TABLET, EXTENDED RELEASE ORAL 2 TIMES DAILY
Qty: 360 TABLET | Refills: 1 | Status: SHIPPED | OUTPATIENT
Start: 2021-08-11 | End: 2022-02-16 | Stop reason: SDUPTHER

## 2021-08-11 RX ORDER — HYDROCHLOROTHIAZIDE 12.5 MG/1
12.5 TABLET ORAL DAILY
Qty: 90 TABLET | Refills: 3 | Status: SHIPPED | OUTPATIENT
Start: 2021-08-11 | End: 2022-07-21

## 2021-08-11 ASSESSMENT — FIBROSIS 4 INDEX: FIB4 SCORE: 1.35

## 2021-08-11 NOTE — ASSESSMENT & PLAN NOTE
Chronic medical problem. She is taking simvastatin 40 mg every evening. Denies myalgias. She is not exercising.  Her recent labs that are scanned in her chart from [facility] show that her total cholesterol is 206, triglycerides 133, HDL 58 and .  She states that she has been compliant with her simvastatin.  She states that she was recently on vacation for 5 weeks in [facility] and was not watching her diet.  She is on daily baby aspirin.

## 2021-08-11 NOTE — ASSESSMENT & PLAN NOTE
Chronic medical problem.  Her blood pressure is at goal today.  She is taking amlodipine 10 mg daily, hydrochlorothiazide 12.5 mg daily and losartan 100 mg daily.  She would like a medication refill on her hydrochlorothiazide.  She denies any chest pain, shortness of breath, described blurred vision, headaches.  She had recent labs that she would like to review today.

## 2021-08-11 NOTE — PROGRESS NOTES
Luis M subjective:     CC: Lab results and diabetes    HPI:   Elizabeth presents today with the following:    Type 2 diabetes mellitus with hyperglycemia, without long-term current use of insulin (HCC)  Chronic medical problem. She is taking Synjardy XR 5-1000 mg daily. She states that she went to a clinic [facility] when she was on vacation.  She was prescribed NovoLog 70/30.  She would like to know she can start this medication.  She was on vacation for 5 weeks. She ate fish, bread, beans and rice. She ate rice daily. She is unable to afford seeing the pharmacist.  Her A1c from [facility] shows an increased A1c of 11.0%.  She has been getting her Synjardy from the healthcare pharmacy with free samples as she has difficulty with the cost of the medication.  She states that her blood sugar was running 250-275 while she was in [facility] system and now since being home her blood sugar has been less than 200.      Dyslipidemia  Chronic medical problem. She is taking simvastatin 40 mg every evening. Denies myalgias. She is not exercising.  Her recent labs that are scanned in her chart from [facility] labs show that her total cholesterol is 206, triglycerides 133, HDL 58 and .  She states that she has been compliant with her simvastatin.  She states that she was recently on vacation for 5 weeks in [facility] and was not watching her diet.  She is on daily baby aspirin.    Essential hypertension  Chronic medical problem.  Her blood pressure is at goal today.  She is taking amlodipine 10 mg daily, hydrochlorothiazide 12.5 mg daily and losartan 100 mg daily.  She would like a medication refill on her hydrochlorothiazide.  She denies any chest pain, shortness of breath, described blurred vision, headaches.  She had recent labs that she would like to review today.      Past Medical History:   Diagnosis Date   • Allergic rhinitis    • Dalton's esophagus 4/14/2017   • Dental disorder     partial upper   • Diabetes mellitus without  "complication (HCC) 11/24/2015   • Dyslipidemia    • Essential hypertension 8/25/2015   • GERD (gastroesophageal reflux disease) 5/7/2009   • HTN    • Low back pain    • Migraine headache        Social History     Tobacco Use   • Smoking status: Former Smoker     Packs/day: 0.50     Years: 20.00     Pack years: 10.00     Types: Cigarettes     Quit date: 1/1/2006     Years since quitting: 15.6   • Smokeless tobacco: Never Used   • Tobacco comment: 5 cig's a day for 20 yrs quit in 2006   Vaping Use   • Vaping Use: Never used   Substance Use Topics   • Alcohol use: Not Currently     Alcohol/week: 0.0 oz     Comment: 2 per month,  beer   • Drug use: No       Current Outpatient Medications Ordered in Epic   Medication Sig Dispense Refill   • hydroCHLOROthiazide (HYDRODIURIL) 12.5 MG tablet Take 1 Tablet by mouth every day. 90 Tablet 3   • metFORMIN ER (GLUCOPHAGE XR) 500 MG TABLET SR 24 HR Take 2 Tablets by mouth 2 times a day. 360 Tablet 1   • amLODIPine (NORVASC) 10 MG Tab TAKE 1 TABLET DAILY 90 tablet 3   • losartan (COZAAR) 100 MG Tab TAKE 1 TABLET DAILY 90 tablet 0   • omeprazole (PRILOSEC) 20 MG delayed-release capsule TAKE 1 CAPSULE BY MOUTH EVERY DAY 30 capsule 2   • simvastatin (ZOCOR) 40 MG Tab Take 1 Tab by mouth every evening. 90 Tab 3   • aspirin 81 MG tablet Take 81 mg by mouth every day.     • Lancets Lancets order: Lancets for insurance approved meter meter. Sig: use daily and prn ssx high or low sugar. #100 RF x 3 100 Each 1   • glucose blood strip USE DAILY AND AS NEEDED FOR HIGH OR LOW SUGAR. 100 Strip 3     No current Epic-ordered facility-administered medications on file.       Allergies:  Patient has no known allergies.    Health Maintenance: Completed    ROS:  Per HPI      Objective:     Vital signs reviewed  Exam:  /82 (BP Location: Left arm, Patient Position: Sitting)   Pulse 99   Temp 36.3 °C (97.3 °F) (Temporal)   Resp 16   Ht 1.562 m (5' 1.5\")   Wt 67.6 kg (149 lb)   LMP 12/05/2002  "  SpO2 96%   BMI 27.70 kg/m²  Body mass index is 27.7 kg/m².    Gen: Alert and oriented, No apparent distress.  Neck: Neck is supple without lymphadenopathy.  Lungs: Normal effort, CTA bilaterally, no wheezes, rhonchi, or rales  CV: Regular rate and rhythm. No murmurs, rubs, or gallops.  Ext: No clubbing, cyanosis, edema.      Assessment & Plan:     66 y.o. female with the following -     1. Type 2 diabetes mellitus with hyperglycemia, without long-term current use of insulin (HCC)  Chronic exacerbated problem.  Reviewed her recent A1c results.  She is having difficulty with affording her Synjardy XR.  We will stop her Synjardy and increase her Metformin ER to 1000 mg twice daily.  We discussed adding some exercise, watching her diet more closely, and decreasing her carbohydrate intake.  She verbalized understanding.  We will recheck her A1c in 3 months.  She will continue home blood glucose monitoring.  We discussed that we will maximize her Metformin ER and if her A1c in 3 months is still above goal can consider a lower cost medication such as sulfonylurea.  She verbalized understanding.  - metFORMIN ER (GLUCOPHAGE XR) 500 MG TABLET SR 24 HR; Take 2 Tablets by mouth 2 times a day.  Dispense: 360 Tablet; Refill: 1    2. Essential hypertension  Chronic stable problem.  Her blood pressure is at goal.  She will continue with her amlodipine, hydrochlorothiazide, and losartan.  Hydrochlorothiazide refilled today.  Red flags discussed.  Reviewed her recent lab results.  - hydroCHLOROthiazide (HYDRODIURIL) 12.5 MG tablet; Take 1 Tablet by mouth every day.  Dispense: 90 Tablet; Refill: 3    3. Dyslipidemia  Chronic stable problem.  Reviewed her recent lab results.  She will continue with her baby aspirin daily and simvastatin.        Return in about 3 months (around 11/11/2021) for Diabetes, A1C.    Please note that this dictation was created using voice recognition software. I have made every reasonable attempt to  correct obvious errors, but I expect that there are errors of grammar and possibly content that I did not discover before finalizing the note.

## 2021-08-11 NOTE — ASSESSMENT & PLAN NOTE
Chronic medical problem. She is taking Synjardy XR 5-1000 mg daily. She states that she went to a clinic in [facility] when she was on vacation.  She was prescribed NovoLog 70/30.  She would like to know she can start this medication.  She was on vacation for 5 weeks. She ate fish, bread, beans and rice. She ate rice daily. She is unable to afford seeing the pharmacist.  Her A1c from [facility] shows an increased A1c of 11.0%.  She has been getting her Synjardy from the healthcare pharmacy with free samples as she has difficulty with the cost of the medication.  She states that her blood sugar was running 250-275 while she was in [facility] and now since being home her blood sugar has been less than 200.

## 2021-08-18 ENCOUNTER — PHARMACY VISIT (OUTPATIENT)
Dept: PHARMACY | Facility: MEDICAL CENTER | Age: 66
End: 2021-08-18
Payer: COMMERCIAL

## 2021-09-27 PROCEDURE — RXMED WILLOW AMBULATORY MEDICATION CHARGE: Performed by: NURSE PRACTITIONER

## 2021-09-28 ENCOUNTER — PHARMACY VISIT (OUTPATIENT)
Dept: PHARMACY | Facility: MEDICAL CENTER | Age: 66
End: 2021-09-28
Payer: COMMERCIAL

## 2021-10-19 DIAGNOSIS — I10 ESSENTIAL HYPERTENSION: ICD-10-CM

## 2021-10-20 RX ORDER — LOSARTAN POTASSIUM 100 MG/1
TABLET ORAL
Qty: 90 TABLET | Refills: 3 | Status: SHIPPED | OUTPATIENT
Start: 2021-10-20

## 2021-10-20 NOTE — TELEPHONE ENCOUNTER
Requested Prescriptions     Signed Prescriptions Disp Refills   • losartan (COZAAR) 100 MG Tab 90 Tablet 3     Sig: TAKE 1 TABLET DAILY     Authorizing Provider: JESSIE HOOD A.P.R.N.

## 2021-10-29 PROCEDURE — RXMED WILLOW AMBULATORY MEDICATION CHARGE: Performed by: NURSE PRACTITIONER

## 2021-11-01 ENCOUNTER — PHARMACY VISIT (OUTPATIENT)
Dept: PHARMACY | Facility: MEDICAL CENTER | Age: 66
End: 2021-11-01
Payer: COMMERCIAL

## 2021-11-12 ENCOUNTER — OFFICE VISIT (OUTPATIENT)
Dept: MEDICAL GROUP | Facility: PHYSICIAN GROUP | Age: 66
End: 2021-11-12
Payer: COMMERCIAL

## 2021-11-12 VITALS
BODY MASS INDEX: 27.29 KG/M2 | WEIGHT: 154 LBS | SYSTOLIC BLOOD PRESSURE: 124 MMHG | DIASTOLIC BLOOD PRESSURE: 80 MMHG | HEIGHT: 63 IN | TEMPERATURE: 98.1 F | HEART RATE: 82 BPM | OXYGEN SATURATION: 97 %

## 2021-11-12 DIAGNOSIS — E11.65 TYPE 2 DIABETES MELLITUS WITH HYPERGLYCEMIA, WITHOUT LONG-TERM CURRENT USE OF INSULIN (HCC): ICD-10-CM

## 2021-11-12 DIAGNOSIS — K22.70 BARRETT'S ESOPHAGUS WITHOUT DYSPLASIA: ICD-10-CM

## 2021-11-12 DIAGNOSIS — Z23 NEED FOR VACCINATION: ICD-10-CM

## 2021-11-12 PROBLEM — K59.09 OTHER CONSTIPATION: Status: RESOLVED | Noted: 2020-11-13 | Resolved: 2021-11-12

## 2021-11-12 PROBLEM — R09.82 POSTNASAL DRIP: Status: RESOLVED | Noted: 2019-08-16 | Resolved: 2021-11-12

## 2021-11-12 PROBLEM — N89.8 VAGINAL ITCHING: Status: RESOLVED | Noted: 2021-05-07 | Resolved: 2021-11-12

## 2021-11-12 PROBLEM — R42 VERTIGO: Status: RESOLVED | Noted: 2020-11-13 | Resolved: 2021-11-12

## 2021-11-12 PROBLEM — R22.9 LOCALIZED SUPERFICIAL SWELLING, MASS, OR LUMP: Status: RESOLVED | Noted: 2020-03-26 | Resolved: 2021-11-12

## 2021-11-12 LAB
HBA1C MFR BLD: 9.4 % (ref 0–5.6)
INT CON NEG: ABNORMAL
INT CON POS: ABNORMAL

## 2021-11-12 PROCEDURE — 99214 OFFICE O/P EST MOD 30 MIN: CPT | Mod: 25 | Performed by: NURSE PRACTITIONER

## 2021-11-12 PROCEDURE — 90686 IIV4 VACC NO PRSV 0.5 ML IM: CPT | Performed by: NURSE PRACTITIONER

## 2021-11-12 PROCEDURE — 90471 IMMUNIZATION ADMIN: CPT | Performed by: NURSE PRACTITIONER

## 2021-11-12 PROCEDURE — RXMED WILLOW AMBULATORY MEDICATION CHARGE: Performed by: NURSE PRACTITIONER

## 2021-11-12 PROCEDURE — 83036 HEMOGLOBIN GLYCOSYLATED A1C: CPT | Performed by: NURSE PRACTITIONER

## 2021-11-12 RX ORDER — OMEPRAZOLE 20 MG/1
20 CAPSULE, DELAYED RELEASE ORAL
Qty: 90 CAPSULE | Refills: 2 | Status: SHIPPED | OUTPATIENT
Start: 2021-11-12

## 2021-11-12 RX ORDER — PIOGLITAZONEHYDROCHLORIDE 15 MG/1
15 TABLET ORAL DAILY
Qty: 90 TABLET | Refills: 1 | Status: SHIPPED | OUTPATIENT
Start: 2021-11-12 | End: 2022-02-16

## 2021-11-12 RX ORDER — PIOGLITAZONEHYDROCHLORIDE 15 MG/1
15 TABLET ORAL DAILY
Qty: 90 TABLET | Refills: 1 | Status: SHIPPED | OUTPATIENT
Start: 2021-11-12 | End: 2021-11-12

## 2021-11-12 ASSESSMENT — FIBROSIS 4 INDEX: FIB4 SCORE: 1.35

## 2021-11-12 NOTE — PROGRESS NOTES
Subjective:     CC: Diabetes    HPI:   Elizabeth presents today with the following:    Type 2 diabetes mellitus with hyperglycemia, without long-term current use of insulin (HCC)  Chronic medical problem. She is taking Metformin ER 1000 mg twice daily.  Her last A1c was 11% on 8/3/2021.  She states that she has been tolerating the Metformin, no diarrhea or loose stool.  She has not had any low blood sugars in the last 3 months.  Her blood sugars have been ranging 110-261.Today after eating her blood sugar was 261 and yesterday her fasting blood sugar was 140. She is not exercising.  She states that she does eat 3 tortillas daily.  She has been caring for her  who had blood clot and infection in his leg.  She is due for A1c and monofilament exam today.  She is on simvastatin.    Dalton's esophagus  Chronic medical problem.  She is taking omeprazole 20 mg daily.  No heartburn or indigestion.  She is asking for a medication refill today.      Past Medical History:   Diagnosis Date   • Allergic rhinitis    • Dalton's esophagus 4/14/2017   • Dental disorder     partial upper   • Diabetes mellitus without complication (HCC) 11/24/2015   • Dyslipidemia    • Essential hypertension 8/25/2015   • GERD (gastroesophageal reflux disease) 5/7/2009   • HTN    • Low back pain    • Migraine headache    • Tear of medial cartilage or meniscus of knee, current 2/13/2014       Social History     Tobacco Use   • Smoking status: Former Smoker     Packs/day: 0.50     Years: 20.00     Pack years: 10.00     Types: Cigarettes     Quit date: 1/1/2006     Years since quitting: 15.8   • Smokeless tobacco: Never Used   • Tobacco comment: 5 cig's a day for 20 yrs quit in 2006   Vaping Use   • Vaping Use: Never used   Substance Use Topics   • Alcohol use: Not Currently     Alcohol/week: 0.0 oz     Comment: 2 per month,  beer   • Drug use: No       Current Outpatient Medications Ordered in Epic   Medication Sig Dispense Refill   • omeprazole  "(PRILOSEC) 20 MG delayed-release capsule Take 1 Capsule by mouth every day. 90 Capsule 2   • pioglitazone (ACTOS) 15 MG Tab Take 1 Tablet by mouth every day. 90 Tablet 1   • losartan (COZAAR) 100 MG Tab TAKE 1 TABLET DAILY 90 Tablet 3   • hydroCHLOROthiazide (HYDRODIURIL) 12.5 MG tablet Take 1 Tablet by mouth every day. 90 Tablet 3   • metFORMIN ER (GLUCOPHAGE XR) 500 MG TABLET SR 24 HR Take 2 Tablets by mouth 2 times a day. 360 Tablet 1   • amLODIPine (NORVASC) 10 MG Tab TAKE 1 TABLET DAILY 90 tablet 3   • simvastatin (ZOCOR) 40 MG Tab Take 1 Tab by mouth every evening. 90 Tab 3   • aspirin 81 MG tablet Take 81 mg by mouth every day.     • glucose blood strip USE DAILY AND AS NEEDED FOR HIGH OR LOW SUGAR. 100 Strip 3     No current Epic-ordered facility-administered medications on file.       Allergies:  Patient has no known allergies.    Health Maintenance: Due for influenza vaccine, monofilament exam and Covid booster.    Objective:     Vital signs reviewed  Exam:  /80 (BP Location: Right arm, Patient Position: Sitting, BP Cuff Size: Adult)   Pulse 82   Temp 36.7 °C (98.1 °F) (Temporal)   Ht 1.6 m (5' 3\")   Wt 69.9 kg (154 lb)   LMP 12/05/2002   SpO2 97%   BMI 27.28 kg/m²  Body mass index is 27.28 kg/m².    Gen: Alert and oriented, No apparent distress.  Lungs: Normal effort, CTA bilaterally, no wheezes, rhonchi, or rales  CV: Regular rate and rhythm. No murmurs, rubs, or gallops.  Ext: No clubbing, cyanosis, edema.    Monofilament testing with a 10 gram force: sensation intact: intact bilaterally  Visual Inspection: Feet without maceration, ulcers, fissures. Dry skin, encouraged to moisturize.   Pedal pulses: intact bilaterally      Labs:     Results for JORGE L PATRICK (MRN [id number]) as of 11/12/2021 10:35   Ref. Range 11/12/2021 10:05   Glycohemoglobin Latest Ref Range: 0.0 - 5.6 % 9.4 (A)       Assessment & Plan:     66 y.o. female with the following -     1. Type 2 diabetes mellitus with " hyperglycemia, without long-term current use of insulin (HCC)  Chronic exacerbated problem.  Her A1c still not at goal but is down from her previous 11%.  She will continue with her Metformin ER 1000 mg twice daily.  We discussed that she states that she does have financial difficulties with affording the newer medications.  She is unable to afford visits with a pharmacist.  We discussed we will start pioglitazone 15 mg daily.  Informed her that I would like her to contact her office immediately if she is unable to afford the medication.  She verbalized understanding.  Monofilament exam completed today.  We discussed that she should make small changes which can include decreasing her tortilla intake by 1 each day and adding a 10-minute brisk walk daily.  She verbalized understanding.  She will return in 3 months for follow-up.  - POCT Hemoglobin A1C  - Diabetic Monofilament Lower Extremity Exam  - pioglitazone (ACTOS) 15 MG Tab; Take 1 Tablet by mouth every day.  Dispense: 90 Tablet; Refill: 1    2. Dalton's esophagus without dysplasia  Chronic stable problem.  She will continue with her omeprazole 20 mg daily.  Medication refilled today.  No acute complaints today.  Continue to avoid triggers.  - omeprazole (PRILOSEC) 20 MG delayed-release capsule; Take 1 Capsule by mouth every day.  Dispense: 90 Capsule; Refill: 2    3. Need for vaccination  Acute uncomplicated problem.  She would like her influenza vaccine today. I have placed the below orders and discussed them with an approved delegating provider.  The MA is performing the below orders under the direction of Dr. De Leon.  - INFLUENZA VACCINE QUAD INJ (PF)      Return in about 3 months (around 2/12/2022) for Diabetes, A1C.    Please note that this dictation was created using voice recognition software. I have made every reasonable attempt to correct obvious errors, but I expect that there are errors of grammar and possibly content that I did not discover before  finalizing the note.

## 2021-11-12 NOTE — ASSESSMENT & PLAN NOTE
Chronic medical problem. She is taking Metformin ER 1000 mg twice daily.  Her last A1c was 11% on 8/3/2021.  She states that she has been tolerating the Metformin, no diarrhea or loose stool.  She has not had any low blood sugars in the last 3 months.  Her blood sugars have been ranging 110-261.Today after eating her blood sugar was 261 and yesterday her fasting blood sugar was 140. She is not exercising.  She states that she does eat 3 tortillas daily.  She has been caring for her  who had blood clot and infection in his leg.  She is due for A1c and monofilament exam today.  She is on simvastatin.

## 2021-11-12 NOTE — ASSESSMENT & PLAN NOTE
Chronic medical problem.  She is taking omeprazole 20 mg daily.  No heartburn or indigestion.  She is asking for a medication refill today.

## 2021-11-15 ENCOUNTER — PHARMACY VISIT (OUTPATIENT)
Dept: PHARMACY | Facility: MEDICAL CENTER | Age: 66
End: 2021-11-15
Payer: COMMERCIAL

## 2021-12-07 PROCEDURE — RXMED WILLOW AMBULATORY MEDICATION CHARGE: Performed by: NURSE PRACTITIONER

## 2021-12-10 ENCOUNTER — PHARMACY VISIT (OUTPATIENT)
Dept: PHARMACY | Facility: MEDICAL CENTER | Age: 66
End: 2021-12-10
Payer: COMMERCIAL

## 2021-12-27 DIAGNOSIS — E78.5 DYSLIPIDEMIA: ICD-10-CM

## 2021-12-28 RX ORDER — SIMVASTATIN 40 MG
TABLET ORAL
Qty: 90 TABLET | Refills: 3 | Status: SHIPPED | OUTPATIENT
Start: 2021-12-28 | End: 2023-07-13

## 2021-12-28 NOTE — TELEPHONE ENCOUNTER
Requested Prescriptions     Signed Prescriptions Disp Refills   • simvastatin (ZOCOR) 40 MG Tab 90 Tablet 3     Sig: TAKE 1 TABLET EVERY EVENING     Authorizing Provider: JESSIE HOOD A.P.R.N.

## 2022-01-10 PROCEDURE — RXMED WILLOW AMBULATORY MEDICATION CHARGE: Performed by: NURSE PRACTITIONER

## 2022-01-14 ENCOUNTER — PHARMACY VISIT (OUTPATIENT)
Dept: PHARMACY | Facility: MEDICAL CENTER | Age: 67
End: 2022-01-14
Payer: COMMERCIAL

## 2022-02-16 ENCOUNTER — OFFICE VISIT (OUTPATIENT)
Dept: MEDICAL GROUP | Facility: PHYSICIAN GROUP | Age: 67
End: 2022-02-16
Payer: COMMERCIAL

## 2022-02-16 VITALS
DIASTOLIC BLOOD PRESSURE: 64 MMHG | WEIGHT: 159 LBS | HEIGHT: 63 IN | SYSTOLIC BLOOD PRESSURE: 106 MMHG | OXYGEN SATURATION: 97 % | BODY MASS INDEX: 28.17 KG/M2 | HEART RATE: 81 BPM | TEMPERATURE: 98.1 F

## 2022-02-16 DIAGNOSIS — Z12.31 ENCOUNTER FOR SCREENING MAMMOGRAM FOR BREAST CANCER: ICD-10-CM

## 2022-02-16 DIAGNOSIS — F33.1 MODERATE EPISODE OF RECURRENT MAJOR DEPRESSIVE DISORDER (HCC): ICD-10-CM

## 2022-02-16 DIAGNOSIS — E11.65 TYPE 2 DIABETES MELLITUS WITH HYPERGLYCEMIA, WITHOUT LONG-TERM CURRENT USE OF INSULIN (HCC): ICD-10-CM

## 2022-02-16 PROBLEM — F32.9 MAJOR DEPRESSIVE DISORDER: Status: ACTIVE | Noted: 2022-02-16

## 2022-02-16 LAB
HBA1C MFR BLD: 9.6 % (ref 0–5.6)
INT CON NEG: ABNORMAL
INT CON POS: ABNORMAL

## 2022-02-16 PROCEDURE — 83036 HEMOGLOBIN GLYCOSYLATED A1C: CPT | Performed by: NURSE PRACTITIONER

## 2022-02-16 PROCEDURE — 99214 OFFICE O/P EST MOD 30 MIN: CPT | Performed by: NURSE PRACTITIONER

## 2022-02-16 RX ORDER — PIOGLITAZONEHYDROCHLORIDE 30 MG/1
30 TABLET ORAL DAILY
Qty: 90 TABLET | Refills: 1 | Status: SHIPPED | OUTPATIENT
Start: 2022-02-16 | End: 2022-05-18

## 2022-02-16 RX ORDER — METFORMIN HYDROCHLORIDE 500 MG/1
1000 TABLET, EXTENDED RELEASE ORAL 2 TIMES DAILY
Qty: 360 TABLET | Refills: 1 | Status: SHIPPED | OUTPATIENT
Start: 2022-02-16 | End: 2022-07-28

## 2022-02-16 ASSESSMENT — FIBROSIS 4 INDEX: FIB4 SCORE: 1.35

## 2022-02-16 ASSESSMENT — PATIENT HEALTH QUESTIONNAIRE - PHQ9
5. POOR APPETITE OR OVEREATING: 1 - SEVERAL DAYS
CLINICAL INTERPRETATION OF PHQ2 SCORE: 3
SUM OF ALL RESPONSES TO PHQ QUESTIONS 1-9: 12

## 2022-02-16 NOTE — ASSESSMENT & PLAN NOTE
Her PHQ score today is 12. On her screening questionnaire she answered more than half the days to thoughts of harming herself or hurting herself. She denies any plan today or previously. She states that her  is having complications with his left lower leg. She is his primary caregiver. This started in August. She is having difficulty sleeping and admits that she will take her 's sleeping medication, she does not know the name of the medication. She does have family and friends for support. Years ago when her father was sick she was on medication for depression. She denies any self harm or SI today.

## 2022-02-16 NOTE — ASSESSMENT & PLAN NOTE
She is due for A1c today. Her previous A1c was 9.4%. She continues with Metformin ER 1000 mg twice daily and pioglitazone 15 mg daily. She is tolerating the pioglitazone. She reports past history of being told to stop this medication and on review of her chart she was also concurrently taking Lantus and glipizide with her pioglitazone. Her blood sugar this morning was 111 and the previous morning blood sugars are in the 200's, averaging 3 times a week. She is eating meat, potatoes, vegetables, rice, chicken, beans and 3 tortillas almost daily. She is not exercising. Her  is sick and she is his primary caregiver. Her  has had multiple complications including infection.

## 2022-02-16 NOTE — PROGRESS NOTES
Subjective:     CC: Diabetes follow-up    HPI:   Elizabeth presents today with the following:     Type 2 diabetes mellitus with hyperglycemia, without long-term current use of insulin (HCC)  She is due for A1c today. Her previous A1c was 9.4%. She continues with Metformin ER 1000 mg twice daily and pioglitazone 15 mg daily. She is tolerating the pioglitazone. She reports past history of being told to stop this medication and on review of her chart she was also concurrently taking Lantus and glipizide with her pioglitazone. Her blood sugar this morning was 111 and the previous morning blood sugars are in the 200's, averaging 3 times a week. She is eating meat, potatoes, vegetables, rice, chicken, beans and 3 tortillas almost daily. She is not exercising. Her  is sick and she is his primary caregiver. Her  has had multiple complications including infection.      Moderate episode of recurrent major depressive disorder (HCC)  Her PHQ score today is 12. On her screening questionnaire she answered more than half the days to thoughts of harming herself or hurting herself. She denies any plan today or previously. She states that her  is having complications with his left lower leg. She is his primary caregiver. This started in August. She is having difficulty sleeping and admits that she will take her 's sleeping medication, she does not know the name of the medication. She does have family and friends for support. Years ago when her father was sick she was on medication for depression. She denies any self harm or SI today.          Past Medical History:   Diagnosis Date   • Allergic rhinitis    • Dalton's esophagus 4/14/2017   • Dental disorder     partial upper   • Diabetes mellitus without complication (HCC) 11/24/2015   • Dyslipidemia    • Essential hypertension 8/25/2015   • GERD (gastroesophageal reflux disease) 5/7/2009   • HTN    • Low back pain    • Migraine headache    • Tear of medial  "cartilage or meniscus of knee, current 2014       Social History     Tobacco Use   • Smoking status: Former Smoker     Packs/day: 0.50     Years: 20.00     Pack years: 10.00     Types: Cigarettes     Quit date: 2006     Years since quittin.1   • Smokeless tobacco: Never Used   • Tobacco comment: 5 cig's a day for 20 yrs quit in    Vaping Use   • Vaping Use: Never used   Substance Use Topics   • Alcohol use: Not Currently     Alcohol/week: 0.0 oz   • Drug use: No       Current Outpatient Medications Ordered in Epic   Medication Sig Dispense Refill   • pioglitazone (ACTOS) 30 MG Tab Take 1 Tablet by mouth every day. 90 Tablet 1   • metFORMIN ER (GLUCOPHAGE XR) 500 MG TABLET SR 24 HR Take 2 Tablets by mouth 2 times a day. 360 Tablet 1   • sertraline (ZOLOFT) 50 MG Tab Take 1 Tablet by mouth every day. 30 Tablet 2   • simvastatin (ZOCOR) 40 MG Tab TAKE 1 TABLET EVERY EVENING 90 Tablet 3   • omeprazole (PRILOSEC) 20 MG delayed-release capsule Take 1 Capsule by mouth every day. 90 Capsule 2   • losartan (COZAAR) 100 MG Tab TAKE 1 TABLET DAILY 90 Tablet 3   • hydroCHLOROthiazide (HYDRODIURIL) 12.5 MG tablet Take 1 Tablet by mouth every day. 90 Tablet 3   • amLODIPine (NORVASC) 10 MG Tab TAKE 1 TABLET DAILY 90 tablet 3   • aspirin 81 MG tablet Take 81 mg by mouth every day.     • glucose blood strip USE DAILY AND AS NEEDED FOR HIGH OR LOW SUGAR. 100 Strip 3     No current Epic-ordered facility-administered medications on file.       Allergies:  Patient has no known allergies.    Health Maintenance: Due for mammogram and retinal screening. She states that she has upcoming appointment next week for her retinal screening.    Objective:     Vital signs reviewed  Exam:  /64 (BP Location: Right arm, Patient Position: Sitting, BP Cuff Size: Adult)   Pulse 81   Temp 36.7 °C (98.1 °F) (Temporal)   Ht 1.6 m (5' 3\")   Wt 72.1 kg (159 lb)   LMP 2002   SpO2 97%   BMI 28.17 kg/m²  Body mass index is " 28.17 kg/m².    Gen: Alert and oriented, No apparent distress. Tearful when discussing her .  Neck: Neck is supple, full ROM.  Lungs: Normal effort, no audible wheezes  CV: Skin pink, warm and dry.  Ext: No clubbing, cyanosis, edema.    Labs:     Results for JORGE L PATRICK (MRN [id number]) as of 2/16/2022 11:34   Ref. Range 2/16/2022 10:55   Glycohemoglobin Latest Ref Range: 0.0 - 5.6 % 9.6 (A)       Assessment & Plan:     66 y.o. female with the following -     1. Type 2 diabetes mellitus with hyperglycemia, without long-term current use of insulin (HCC)  Chronic exacerbated problem. Her A1c has increased today to 9.6%. We will increase her pioglitazone to 30 mg daily and she will continue with her Metformin ER 1000 mg twice daily. Reinforced diet and lifestyle modifications. Encouraged even walking 10-minute brisk walk 1-2 times a week to start for her exercise. I would like her to return to see me in 3 months for repeat A1c.  - POCT Hemoglobin A1C  - pioglitazone (ACTOS) 30 MG Tab; Take 1 Tablet by mouth every day.  Dispense: 90 Tablet; Refill: 1  - metFORMIN ER (GLUCOPHAGE XR) 500 MG TABLET SR 24 HR; Take 2 Tablets by mouth 2 times a day.  Dispense: 360 Tablet; Refill: 1    2. Moderate episode of recurrent major depressive disorder (HCC)  Chronic exacerbated problem. She has had past history of depression when her father was ill. We discussed and reviewed her PHQ score today. She denies any current plans to harm herself. She denies any self-harm or SI today. We did discuss that if she does develop these thoughts or feelings this is a medical emergency and I would like her to go to the ER or call 911 for help. She verbalized understanding. After thorough discussion she was in agreement to start medication for depression. We will start her on sertraline 50 mg daily and I would like her to return in 1 month for follow-up on her medication. She verbalized understanding. I offered her referral to psychiatry or  behavioral health and she declines these referrals today.  - Patient has been identified as having a positive depression screening. Appropriate orders and counseling have been given.  - sertraline (ZOLOFT) 50 MG Tab; Take 1 Tablet by mouth every day.  Dispense: 30 Tablet; Refill: 2    3. Encounter for screening mammogram for breast cancer  Chronic stable problem. Due for mammogram.  - MA-SCREENING MAMMO BILAT W/TOMOSYNTHESIS W/CAD; Future      Return in about 4 weeks (around 3/16/2022) for depression, 3 months for diabetes and A1C.    Please note that this dictation was created using voice recognition software. I have made every reasonable attempt to correct obvious errors, but I expect that there are errors of grammar and possibly content that I did not discover before finalizing the note.

## 2022-03-18 ENCOUNTER — HOSPITAL ENCOUNTER (OUTPATIENT)
Dept: RADIOLOGY | Facility: MEDICAL CENTER | Age: 67
End: 2022-03-18
Attending: NURSE PRACTITIONER
Payer: COMMERCIAL

## 2022-03-18 DIAGNOSIS — Z12.31 ENCOUNTER FOR SCREENING MAMMOGRAM FOR BREAST CANCER: ICD-10-CM

## 2022-03-18 PROCEDURE — 77063 BREAST TOMOSYNTHESIS BI: CPT

## 2022-04-11 ENCOUNTER — OFFICE VISIT (OUTPATIENT)
Dept: MEDICAL GROUP | Facility: PHYSICIAN GROUP | Age: 67
End: 2022-04-11
Payer: COMMERCIAL

## 2022-04-11 VITALS
HEIGHT: 63 IN | TEMPERATURE: 98.4 F | DIASTOLIC BLOOD PRESSURE: 76 MMHG | SYSTOLIC BLOOD PRESSURE: 112 MMHG | HEART RATE: 78 BPM | BODY MASS INDEX: 28 KG/M2 | OXYGEN SATURATION: 95 % | WEIGHT: 158 LBS

## 2022-04-11 DIAGNOSIS — E11.65 TYPE 2 DIABETES MELLITUS WITH HYPERGLYCEMIA, WITHOUT LONG-TERM CURRENT USE OF INSULIN (HCC): ICD-10-CM

## 2022-04-11 DIAGNOSIS — I10 ESSENTIAL HYPERTENSION: ICD-10-CM

## 2022-04-11 DIAGNOSIS — F33.0 MILD EPISODE OF RECURRENT MAJOR DEPRESSIVE DISORDER (HCC): ICD-10-CM

## 2022-04-11 PROCEDURE — 99214 OFFICE O/P EST MOD 30 MIN: CPT | Performed by: NURSE PRACTITIONER

## 2022-04-11 ASSESSMENT — PATIENT HEALTH QUESTIONNAIRE - PHQ9
5. POOR APPETITE OR OVEREATING: 1 - SEVERAL DAYS
SUM OF ALL RESPONSES TO PHQ QUESTIONS 1-9: 6
CLINICAL INTERPRETATION OF PHQ2 SCORE: 2

## 2022-04-11 ASSESSMENT — FIBROSIS 4 INDEX: FIB4 SCORE: 1.35

## 2022-04-11 NOTE — ASSESSMENT & PLAN NOTE
Her initial blood pressure today is 140/76.  She reports that she has been compliant with her amlodipine 10 mg daily, hydrochlorothiazide 12.5 mg daily and losartan 100 mg daily.  She reports her home blood pressures have been ranging 120's/70's.

## 2022-04-11 NOTE — ASSESSMENT & PLAN NOTE
Her previous PHQ score was 12 and today her score is 6. She has been taking sertraline 50 mg daily.  She is here for medication follow-up. She states the medication made her sleepy when she first started, this has resolved. She continues as her husbands primary caregiver. Her  continues to have difficulty walking and is getting physical therapy 2x a week. She is walking her dog daily for 30 minutes. She denies any self harm or SI today.

## 2022-04-11 NOTE — PROGRESS NOTES
Subjective:     CC: depression     HPI:   Elizabeth presents today with the following:     Mild episode of recurrent major depressive disorder (HCC)  Her previous PHQ score was 12 and today her score is 6. She has been taking sertraline 50 mg daily.  She is here for medication follow-up. She states the medication made her sleepy when she first started, this has resolved. She continues as her husbands primary caregiver. Her  continues to have difficulty walking and is getting physical therapy 2x a week. She is walking her dog daily for 30 minutes. She denies any self harm or SI today.     Essential hypertension  Her initial blood pressure today is 140/76.  She reports that she has been compliant with her amlodipine 10 mg daily, hydrochlorothiazide 12.5 mg daily and losartan 100 mg daily.  She reports her home blood pressures have been ranging 120's/70's.      Past Medical History:   Diagnosis Date   • Allergic rhinitis    • Dalton's esophagus 2017   • Dental disorder     partial upper   • Diabetes mellitus without complication (HCC) 2015   • Dyslipidemia    • Essential hypertension 2015   • GERD (gastroesophageal reflux disease) 2009   • HTN    • Low back pain    • Migraine headache    • Tear of medial cartilage or meniscus of knee, current 2014       Social History     Tobacco Use   • Smoking status: Former Smoker     Packs/day: 0.50     Years: 20.00     Pack years: 10.00     Types: Cigarettes     Quit date: 2006     Years since quittin.2   • Smokeless tobacco: Never Used   • Tobacco comment: 5 cig's a day for 20 yrs quit in    Vaping Use   • Vaping Use: Never used   Substance Use Topics   • Alcohol use: Not Currently     Alcohol/week: 0.0 oz   • Drug use: No       Current Outpatient Medications Ordered in Epic   Medication Sig Dispense Refill   • sertraline (ZOLOFT) 50 MG Tab Take 1 Tablet by mouth every day. 90 Tablet 2   • pioglitazone (ACTOS) 30 MG Tab Take 1 Tablet by  "mouth every day. 90 Tablet 1   • metFORMIN ER (GLUCOPHAGE XR) 500 MG TABLET SR 24 HR Take 2 Tablets by mouth 2 times a day. 360 Tablet 1   • simvastatin (ZOCOR) 40 MG Tab TAKE 1 TABLET EVERY EVENING 90 Tablet 3   • omeprazole (PRILOSEC) 20 MG delayed-release capsule Take 1 Capsule by mouth every day. 90 Capsule 2   • losartan (COZAAR) 100 MG Tab TAKE 1 TABLET DAILY 90 Tablet 3   • hydroCHLOROthiazide (HYDRODIURIL) 12.5 MG tablet Take 1 Tablet by mouth every day. 90 Tablet 3   • amLODIPine (NORVASC) 10 MG Tab TAKE 1 TABLET DAILY 90 tablet 3   • aspirin 81 MG tablet Take 81 mg by mouth every day.     • glucose blood strip USE DAILY AND AS NEEDED FOR HIGH OR LOW SUGAR. 100 Strip 3     No current Epic-ordered facility-administered medications on file.       Allergies:  Patient has no known allergies.    Health Maintenance: Due for retinal exam     Objective:     Vital signs reviewed   Exam:  /76 (BP Location: Left arm, Patient Position: Sitting)   Pulse 78   Temp 36.9 °C (98.4 °F) (Temporal)   Ht 1.6 m (5' 3\")   Wt 71.7 kg (158 lb)   LMP 12/05/2002   SpO2 95%   BMI 27.99 kg/m²  Body mass index is 27.99 kg/m².    Gen: Alert and oriented, No apparent distress.  Neck: Neck is supple without lymphadenopathy.  Lungs: Normal effort, CTA bilaterally, no wheezes, rhonchi, or rales  CV: Regular rate and rhythm. No murmurs, rubs, or gallops.  Ext: No clubbing, cyanosis, edema.      Assessment & Plan:     66 y.o. female with the following -     1. Mild episode of recurrent major depressive disorder (HCC)  Chronic stable problem.  Discussed and reviewed her recent PHQ results.  She denies any self-harm or SI today.  Discussed continue with sertraline 50 mg daily for at least 6 months and then we can discuss weaning at that time.  She verbalized understanding.  - sertraline (ZOLOFT) 50 MG Tab; Take 1 Tablet by mouth every day.  Dispense: 90 Tablet; Refill: 2    2. Essential hypertension  Chronic stable problem.  On " repeat by me today her blood pressure is 112/76.  She will continue with home blood pressure monitoring.  She will continue with her amlodipine 10 mg daily, hydrochlorothiazide 12.5 mg daily and losartan 100 mg daily.    3. Type 2 diabetes mellitus with hyperglycemia, without long-term current use of insulin (HCC)  Chronic stable problem.  Due for retinal screening.  We completed POCT retinal eye exam in office today.  She has upcoming appointment scheduled for follow-up on her diabetes for 5/18/2022.  - POCT Retinal Eye Exam        Return Keep upcoming 5/18/2022 appointment.    Please note that this dictation was created using voice recognition software. I have made every reasonable attempt to correct obvious errors, but I expect that there are errors of grammar and possibly content that I did not discover before finalizing the note.

## 2022-05-04 PROBLEM — M23.322 MEDIAL MENISCUS, POSTERIOR HORN DERANGEMENT, LEFT: Status: ACTIVE | Noted: 2022-05-04

## 2022-05-18 ENCOUNTER — OFFICE VISIT (OUTPATIENT)
Dept: MEDICAL GROUP | Facility: PHYSICIAN GROUP | Age: 67
End: 2022-05-18
Payer: MEDICARE

## 2022-05-18 VITALS
OXYGEN SATURATION: 95 % | WEIGHT: 156 LBS | BODY MASS INDEX: 27.64 KG/M2 | RESPIRATION RATE: 19 BRPM | HEART RATE: 84 BPM | TEMPERATURE: 98.4 F | SYSTOLIC BLOOD PRESSURE: 126 MMHG | HEIGHT: 63 IN | DIASTOLIC BLOOD PRESSURE: 62 MMHG

## 2022-05-18 DIAGNOSIS — E11.65 TYPE 2 DIABETES MELLITUS WITH HYPERGLYCEMIA, WITHOUT LONG-TERM CURRENT USE OF INSULIN (HCC): ICD-10-CM

## 2022-05-18 PROCEDURE — 99214 OFFICE O/P EST MOD 30 MIN: CPT | Performed by: NURSE PRACTITIONER

## 2022-05-18 RX ORDER — PIOGLITAZONEHYDROCHLORIDE 45 MG/1
45 TABLET ORAL DAILY
Qty: 90 TABLET | Refills: 1 | Status: SHIPPED | OUTPATIENT
Start: 2022-05-18

## 2022-05-18 ASSESSMENT — FIBROSIS 4 INDEX: FIB4 SCORE: 1.35

## 2022-05-18 NOTE — PROGRESS NOTES
Subjective:     CC: diabetes     HPI:   Elizabeth presents today with the following:    Type 2 diabetes mellitus with hyperglycemia, without long-term current use of insulin ([facility])  Last A1C was 10.8% on 2022. She needs upcoming surgery to her left knee for meniscus tear but her surgeon, Dr. Kenyon, needs for A1c to be less than 9%.  She continues with metformin ER 1000 mg twice daily and pioglitazone 30 mg daily. Her surgery is scheduled for 2022. She has had her pre-op clearance appointment completed. Her fasting blood sugar today was 159. She is checking daily fasting blood sugars and is under 150, ranging 130-150. She has stopped eating tortillas and rice. She is eating more vegetables and salad. She will eat porkchop rare and red meat once every 2 weeks. She is eating fish and chicken. She is walking in the morning daily for 45 minutes with her dog.       Past Medical History:   Diagnosis Date   • Allergic rhinitis    • Dalton's esophagus 2017   • Dental disorder     partial upper   • Diabetes mellitus without complication (HCC) 2015   • Dyslipidemia    • Essential hypertension 2015   • GERD (gastroesophageal reflux disease) 2009   • HTN    • Low back pain    • Migraine headache    • Tear of medial cartilage or meniscus of knee, current 2014       Social History     Tobacco Use   • Smoking status: Former Smoker     Packs/day: 0.50     Years: 20.00     Pack years: 10.00     Types: Cigarettes     Quit date: 2006     Years since quittin.3   • Smokeless tobacco: Never Used   • Tobacco comment: 5 cig's a day for 20 yrs quit in    Vaping Use   • Vaping Use: Never used   Substance Use Topics   • Alcohol use: Not Currently     Alcohol/week: 0.0 oz   • Drug use: No       Current Outpatient Medications Ordered in Epic   Medication Sig Dispense Refill   • pioglitazone (ACTOS) 45 MG Tab Take 1 Tablet by mouth every day. 90 Tablet 1   • metFORMIN ER (GLUCOPHAGE XR) 500 MG TABLET  "SR 24 HR Take 2 Tablets by mouth 2 times a day. 360 Tablet 1   • simvastatin (ZOCOR) 40 MG Tab TAKE 1 TABLET EVERY EVENING 90 Tablet 3   • omeprazole (PRILOSEC) 20 MG delayed-release capsule Take 1 Capsule by mouth every day. 90 Capsule 2   • losartan (COZAAR) 100 MG Tab TAKE 1 TABLET DAILY 90 Tablet 3   • hydroCHLOROthiazide (HYDRODIURIL) 12.5 MG tablet Take 1 Tablet by mouth every day. 90 Tablet 3   • amLODIPine (NORVASC) 10 MG Tab TAKE 1 TABLET DAILY 90 tablet 3   • aspirin 81 MG tablet Take 81 mg by mouth every day. (Patient not taking: Reported on 5/18/2022)       No current [facility]-ordered facility-administered medications on file.       Allergies:  Patient has no known allergies.    Health Maintenance: Due for retinal screening.  Patient states that her eye doctor recently completed and we will request records.      Objective:     Vital signs reviewed  Exam:  /62 (BP Location: Left arm, Patient Position: Sitting, BP Cuff Size: Adult)   Pulse 84   Temp 36.9 °C (98.4 °F) (Temporal)   Resp 19   Ht 1.6 m (5' 3\")   Wt 70.8 kg (156 lb)   LMP 12/05/2002   SpO2 95%   BMI 27.63 kg/m²  Body mass index is 27.63 kg/m².    Gen: Alert and oriented, No apparent distress.  Lungs: Normal effort, CTA bilaterally, no wheezes, rhonchi, or rales  CV: Regular rate and rhythm. No murmurs, rubs, or gallops.  Ext: No clubbing, cyanosis, edema.        Assessment & Plan:     66 y.o. female with the following -     1. Type 2 diabetes mellitus with hyperglycemia, without long-term current use of insulin (HCC)  Chronic exacerbated problem.  Discussed and reviewed that her recent A1c is above the 9% goal with Dr. Kenyon had requested on her surgery clearance form.  Reinforced diet and lifestyle modifications.  Plan to increase her pioglitazone to 45 mg daily.  She will continue with her metformin ER 1000 mg twice daily.  We also discussed that with her surgery in less than 3 months I like for her to see pharmacotherapy to have " their assistance in getting better control of her diabetes before upcoming appointment.  She verbalized understanding and is in agreement.  I informed her that I would fax back her surgical clearance from [facility] back today informing them that she needs further work-up for control of her diabetes.  She verbalized understanding.  She will return to see me in 7 weeks.  We also discussed that we can plan to repeat her A1c at that upcoming appointment 7 weeks but also informed her that with her insurance they would not cover the A1c as it has been less than 90 days since her last A1c.  She did verbalize understanding.  - pioglitazone (ACTOS) 45 MG Tab; Take 1 Tablet by mouth every day.  Dispense: 90 Tablet; Refill: 1  - Referral to Pharmacotherapy Service        Return in about 7 weeks (around 7/6/2022) for Diabetes.    Please note that this dictation was created using voice recognition software. I have made every reasonable attempt to correct obvious errors, but I expect that there are errors of grammar and possibly content that I did not discover before finalizing the note.

## 2022-05-18 NOTE — ASSESSMENT & PLAN NOTE
Last A1C was 10.8% on 5/5/2022. She needs upcoming surgery to her left knee for meniscus tear but her surgeon, Dr. Kenyon, needs for A1c to be less than 9%.  She continues with metformin ER 1000 mg twice daily and pioglitazone 30 mg daily. Her surgery is scheduled for July 19, 2022. She has had her pre-op clearance appointment completed. Her fasting blood sugar today was 159. She is checking daily fasting blood sugars and is under 150, ranging 130-150. She has stopped eating tortillas and rice. She is eating more vegetables and salad. She will eat porkchop rare and red meat once every 2 weeks. She is eating fish and chicken. She is walking in the morning daily for 45 minutes with her dog.

## 2022-05-18 NOTE — LETTER
[facility]  LUPE VelardeRJOSIAH  [address] [place] [address]  Fax: [phone]   Authorization for Release/Disclosure of   Protected Health Information   Name: JORGE L BLUNT : 1955 SSN: [id number]   Address: [address] [place], [place] [address] [facility] [facility] [address]   Phone:    [phone] (home)    I authorize the entity listed below to release/disclose the PHI below to:   [facility]/WILLIAM Velarde and WILLIAM Velarde   Provider or Entity Name:  [facility]    Address   City, State, Zip   Phone:      Fax:     Reason for request: continuity of care   Information to be released:    [  ] LAST COLONOSCOPY,  including any PATH REPORT and follow-up  [  ] LAST FIT/COLOGUARD RESULT [  ] LAST DEXA  [  ] LAST MAMMOGRAM  [  ] LAST PAP  [  ] LAST LABS [ x] RETINA EXAM REPORT  [  ] IMMUNIZATION RECORDS  [  ] Release all info      [  ] Check here and initial the line next to each item to release ALL health information INCLUDING  _____ Care and treatment for drug and / or alcohol abuse  _____ HIV testing, infection status, or AIDS  _____ Genetic Testing    DATES OF SERVICE OR TIME PERIOD TO BE DISCLOSED: _____________  I understand and acknowledge that:  * This Authorization may be revoked at any time by you in writing, except if your health information has already been used or disclosed.  * Your health information that will be used or disclosed as a result of you signing this authorization could be re-disclosed by the recipient. If this occurs, your re-disclosed health information may no longer be protected by State or Federal laws.  * You may refuse to sign this Authorization. Your refusal will not affect your ability to obtain treatment.  * This Authorization becomes effective upon signing and will  on (date) __________.      If no date is indicated, this Authorization will  one (1) year from the signature date.    Name: Jorge L Blunt    Signature:   Date:      5/18/2022       PLEASE FAX REQUESTED RECORDS BACK TO: [phone]

## 2022-05-21 ENCOUNTER — HOSPITAL ENCOUNTER (OUTPATIENT)
Facility: MEDICAL CENTER | Age: 67
End: 2022-05-22
Attending: EMERGENCY MEDICINE | Admitting: HOSPITALIST
Payer: MEDICARE

## 2022-05-21 ENCOUNTER — APPOINTMENT (OUTPATIENT)
Dept: CARDIOLOGY | Facility: MEDICAL CENTER | Age: 67
End: 2022-05-21
Attending: HOSPITALIST
Payer: MEDICARE

## 2022-05-21 ENCOUNTER — APPOINTMENT (OUTPATIENT)
Dept: RADIOLOGY | Facility: MEDICAL CENTER | Age: 67
End: 2022-05-21
Attending: HOSPITALIST
Payer: MEDICARE

## 2022-05-21 ENCOUNTER — APPOINTMENT (OUTPATIENT)
Dept: RADIOLOGY | Facility: MEDICAL CENTER | Age: 67
End: 2022-05-21
Attending: EMERGENCY MEDICINE
Payer: MEDICARE

## 2022-05-21 DIAGNOSIS — R42 DIZZINESS: ICD-10-CM

## 2022-05-21 DIAGNOSIS — I63.9 ACUTE CVA (CEREBROVASCULAR ACCIDENT) (HCC): ICD-10-CM

## 2022-05-21 DIAGNOSIS — R11.0 NAUSEA: ICD-10-CM

## 2022-05-21 DIAGNOSIS — J01.21 ACUTE RECURRENT ETHMOIDAL SINUSITIS: ICD-10-CM

## 2022-05-21 DIAGNOSIS — R51.9 NONINTRACTABLE HEADACHE, UNSPECIFIED CHRONICITY PATTERN, UNSPECIFIED HEADACHE TYPE: ICD-10-CM

## 2022-05-21 DIAGNOSIS — G43.809 OTHER MIGRAINE WITHOUT STATUS MIGRAINOSUS, NOT INTRACTABLE: ICD-10-CM

## 2022-05-21 LAB
ALBUMIN SERPL BCP-MCNC: 4.5 G/DL (ref 3.2–4.9)
ALBUMIN/GLOB SERPL: 1.5 G/DL
ALP SERPL-CCNC: 90 U/L (ref 30–99)
ALT SERPL-CCNC: 23 U/L (ref 2–50)
AMPHET UR QL SCN: NEGATIVE
ANION GAP SERPL CALC-SCNC: 14 MMOL/L (ref 7–16)
APTT PPP: 25.5 SEC (ref 24.7–36)
AST SERPL-CCNC: 23 U/L (ref 12–45)
BARBITURATES UR QL SCN: NEGATIVE
BASOPHILS # BLD AUTO: 0.4 % (ref 0–1.8)
BASOPHILS # BLD: 0.03 K/UL (ref 0–0.12)
BENZODIAZ UR QL SCN: NEGATIVE
BILIRUB SERPL-MCNC: 0.5 MG/DL (ref 0.1–1.5)
BUN SERPL-MCNC: 10 MG/DL (ref 8–22)
BZE UR QL SCN: NEGATIVE
CALCIUM SERPL-MCNC: 9.4 MG/DL (ref 8.4–10.2)
CANNABINOIDS UR QL SCN: NEGATIVE
CHLORIDE SERPL-SCNC: 97 MMOL/L (ref 96–112)
CO2 SERPL-SCNC: 22 MMOL/L (ref 20–33)
CREAT SERPL-MCNC: 0.47 MG/DL (ref 0.5–1.4)
EKG IMPRESSION: NORMAL
EOSINOPHIL # BLD AUTO: 0.15 K/UL (ref 0–0.51)
EOSINOPHIL NFR BLD: 2.1 % (ref 0–6.9)
ERYTHROCYTE [DISTWIDTH] IN BLOOD BY AUTOMATED COUNT: 43.7 FL (ref 35.9–50)
EST. AVERAGE GLUCOSE BLD GHB EST-MCNC: 232 MG/DL
GFR SERPLBLD CREATININE-BSD FMLA CKD-EPI: 106 ML/MIN/1.73 M 2
GLOBULIN SER CALC-MCNC: 3.1 G/DL (ref 1.9–3.5)
GLUCOSE BLD STRIP.AUTO-MCNC: 114 MG/DL (ref 65–99)
GLUCOSE BLD STRIP.AUTO-MCNC: 149 MG/DL (ref 65–99)
GLUCOSE SERPL-MCNC: 166 MG/DL (ref 65–99)
HBA1C MFR BLD: 9.7 % (ref 4–5.6)
HCT VFR BLD AUTO: 47.7 % (ref 37–47)
HGB BLD-MCNC: 15.7 G/DL (ref 12–16)
HIV 1+2 AB+HIV1 P24 AG SERPL QL IA: NORMAL
IMM GRANULOCYTES # BLD AUTO: 0.03 K/UL (ref 0–0.11)
IMM GRANULOCYTES NFR BLD AUTO: 0.4 % (ref 0–0.9)
INR PPP: 0.83 (ref 0.87–1.13)
LV EJECT FRACT  99904: 65
LV EJECT FRACT MOD 2C 99903: 61.35
LV EJECT FRACT MOD 4C 99902: 61.49
LV EJECT FRACT MOD BP 99901: 62.4
LYMPHOCYTES # BLD AUTO: 1.61 K/UL (ref 1–4.8)
LYMPHOCYTES NFR BLD: 22.7 % (ref 22–41)
MCH RBC QN AUTO: 29 PG (ref 27–33)
MCHC RBC AUTO-ENTMCNC: 32.9 G/DL (ref 33.6–35)
MCV RBC AUTO: 88.2 FL (ref 81.4–97.8)
METHADONE UR QL SCN: NEGATIVE
MONOCYTES # BLD AUTO: 0.63 K/UL (ref 0–0.85)
MONOCYTES NFR BLD AUTO: 8.9 % (ref 0–13.4)
NEUTROPHILS # BLD AUTO: 4.63 K/UL (ref 2–7.15)
NEUTROPHILS NFR BLD: 65.5 % (ref 44–72)
NRBC # BLD AUTO: 0 K/UL
NRBC BLD-RTO: 0 /100 WBC
OPIATES UR QL SCN: NEGATIVE
OXYCODONE UR QL SCN: NEGATIVE
PCP UR QL SCN: NEGATIVE
PLATELET # BLD AUTO: 284 K/UL (ref 164–446)
PMV BLD AUTO: 9.3 FL (ref 9–12.9)
POTASSIUM SERPL-SCNC: 3.6 MMOL/L (ref 3.6–5.5)
PROPOXYPH UR QL SCN: NEGATIVE
PROT SERPL-MCNC: 7.6 G/DL (ref 6–8.2)
PROTHROMBIN TIME: 10.7 SEC (ref 12–14.6)
RBC # BLD AUTO: 5.41 M/UL (ref 4.2–5.4)
SODIUM SERPL-SCNC: 133 MMOL/L (ref 135–145)
TROPONIN T SERPL-MCNC: <6 NG/L (ref 6–19)
TROPONIN T SERPL-MCNC: <6 NG/L (ref 6–19)
WBC # BLD AUTO: 7.1 K/UL (ref 4.8–10.8)

## 2022-05-21 PROCEDURE — A9270 NON-COVERED ITEM OR SERVICE: HCPCS | Performed by: HOSPITALIST

## 2022-05-21 PROCEDURE — 82552 ASSAY OF CPK IN BLOOD: CPT

## 2022-05-21 PROCEDURE — 96372 THER/PROPH/DIAG INJ SC/IM: CPT | Mod: XU

## 2022-05-21 PROCEDURE — 36415 COLL VENOUS BLD VENIPUNCTURE: CPT

## 2022-05-21 PROCEDURE — 700117 HCHG RX CONTRAST REV CODE 255: Performed by: HOSPITALIST

## 2022-05-21 PROCEDURE — 96374 THER/PROPH/DIAG INJ IV PUSH: CPT | Mod: XU

## 2022-05-21 PROCEDURE — 70498 CT ANGIOGRAPHY NECK: CPT

## 2022-05-21 PROCEDURE — 82550 ASSAY OF CK (CPK): CPT

## 2022-05-21 PROCEDURE — 93306 TTE W/DOPPLER COMPLETE: CPT | Mod: 26 | Performed by: INTERNAL MEDICINE

## 2022-05-21 PROCEDURE — 85610 PROTHROMBIN TIME: CPT

## 2022-05-21 PROCEDURE — 70551 MRI BRAIN STEM W/O DYE: CPT

## 2022-05-21 PROCEDURE — 99285 EMERGENCY DEPT VISIT HI MDM: CPT

## 2022-05-21 PROCEDURE — 93306 TTE W/DOPPLER COMPLETE: CPT

## 2022-05-21 PROCEDURE — 96375 TX/PRO/DX INJ NEW DRUG ADDON: CPT | Mod: XU

## 2022-05-21 PROCEDURE — G0378 HOSPITAL OBSERVATION PER HR: HCPCS

## 2022-05-21 PROCEDURE — 83036 HEMOGLOBIN GLYCOSYLATED A1C: CPT

## 2022-05-21 PROCEDURE — 700102 HCHG RX REV CODE 250 W/ 637 OVERRIDE(OP): Performed by: HOSPITALIST

## 2022-05-21 PROCEDURE — 80307 DRUG TEST PRSMV CHEM ANLYZR: CPT

## 2022-05-21 PROCEDURE — 94760 N-INVAS EAR/PLS OXIMETRY 1: CPT

## 2022-05-21 PROCEDURE — 700111 HCHG RX REV CODE 636 W/ 250 OVERRIDE (IP): Performed by: EMERGENCY MEDICINE

## 2022-05-21 PROCEDURE — 87389 HIV-1 AG W/HIV-1&-2 AB AG IA: CPT

## 2022-05-21 PROCEDURE — 700111 HCHG RX REV CODE 636 W/ 250 OVERRIDE (IP): Performed by: HOSPITALIST

## 2022-05-21 PROCEDURE — 70450 CT HEAD/BRAIN W/O DYE: CPT

## 2022-05-21 PROCEDURE — 85025 COMPLETE CBC W/AUTO DIFF WBC: CPT

## 2022-05-21 PROCEDURE — 82962 GLUCOSE BLOOD TEST: CPT

## 2022-05-21 PROCEDURE — 99220 PR INITIAL OBSERVATION CARE,LEVL III: CPT | Performed by: HOSPITALIST

## 2022-05-21 PROCEDURE — 84484 ASSAY OF TROPONIN QUANT: CPT

## 2022-05-21 PROCEDURE — 85730 THROMBOPLASTIN TIME PARTIAL: CPT

## 2022-05-21 PROCEDURE — 93005 ELECTROCARDIOGRAM TRACING: CPT | Performed by: EMERGENCY MEDICINE

## 2022-05-21 PROCEDURE — 80053 COMPREHEN METABOLIC PANEL: CPT

## 2022-05-21 PROCEDURE — 700105 HCHG RX REV CODE 258: Performed by: EMERGENCY MEDICINE

## 2022-05-21 RX ORDER — ONDANSETRON 2 MG/ML
4 INJECTION INTRAMUSCULAR; INTRAVENOUS ONCE
Status: COMPLETED | OUTPATIENT
Start: 2022-05-21 | End: 2022-05-21

## 2022-05-21 RX ORDER — ACETAMINOPHEN 325 MG/1
650 TABLET ORAL EVERY 6 HOURS PRN
Status: DISCONTINUED | OUTPATIENT
Start: 2022-05-21 | End: 2022-05-22 | Stop reason: HOSPADM

## 2022-05-21 RX ORDER — ATORVASTATIN CALCIUM 40 MG/1
80 TABLET, FILM COATED ORAL EVERY EVENING
Status: DISCONTINUED | OUTPATIENT
Start: 2022-05-21 | End: 2022-05-22 | Stop reason: HOSPADM

## 2022-05-21 RX ORDER — PROCHLORPERAZINE EDISYLATE 5 MG/ML
5-10 INJECTION INTRAMUSCULAR; INTRAVENOUS EVERY 4 HOURS PRN
Status: DISCONTINUED | OUTPATIENT
Start: 2022-05-21 | End: 2022-05-22 | Stop reason: HOSPADM

## 2022-05-21 RX ORDER — LORAZEPAM 2 MG/ML
2 INJECTION INTRAMUSCULAR ONCE
Status: COMPLETED | OUTPATIENT
Start: 2022-05-21 | End: 2022-05-21

## 2022-05-21 RX ORDER — PROMETHAZINE HYDROCHLORIDE 25 MG/1
12.5-25 SUPPOSITORY RECTAL EVERY 4 HOURS PRN
Status: DISCONTINUED | OUTPATIENT
Start: 2022-05-21 | End: 2022-05-22 | Stop reason: HOSPADM

## 2022-05-21 RX ORDER — POLYETHYLENE GLYCOL 3350 17 G/17G
1 POWDER, FOR SOLUTION ORAL
Status: DISCONTINUED | OUTPATIENT
Start: 2022-05-21 | End: 2022-05-22 | Stop reason: HOSPADM

## 2022-05-21 RX ORDER — AMLODIPINE BESYLATE 5 MG/1
10 TABLET ORAL DAILY
Status: DISCONTINUED | OUTPATIENT
Start: 2022-05-21 | End: 2022-05-22 | Stop reason: HOSPADM

## 2022-05-21 RX ORDER — HYDROCHLOROTHIAZIDE 25 MG/1
12.5 TABLET ORAL DAILY
Status: DISCONTINUED | OUTPATIENT
Start: 2022-05-21 | End: 2022-05-22 | Stop reason: HOSPADM

## 2022-05-21 RX ORDER — BISACODYL 10 MG
10 SUPPOSITORY, RECTAL RECTAL
Status: DISCONTINUED | OUTPATIENT
Start: 2022-05-21 | End: 2022-05-22 | Stop reason: HOSPADM

## 2022-05-21 RX ORDER — PROMETHAZINE HYDROCHLORIDE 25 MG/1
12.5-25 TABLET ORAL EVERY 4 HOURS PRN
Status: DISCONTINUED | OUTPATIENT
Start: 2022-05-21 | End: 2022-05-22 | Stop reason: HOSPADM

## 2022-05-21 RX ORDER — LOSARTAN POTASSIUM 25 MG/1
100 TABLET ORAL DAILY
Status: DISCONTINUED | OUTPATIENT
Start: 2022-05-21 | End: 2022-05-22 | Stop reason: HOSPADM

## 2022-05-21 RX ORDER — SODIUM CHLORIDE 9 MG/ML
INJECTION, SOLUTION INTRAVENOUS CONTINUOUS
Status: DISCONTINUED | OUTPATIENT
Start: 2022-05-21 | End: 2022-05-22 | Stop reason: HOSPADM

## 2022-05-21 RX ORDER — ONDANSETRON 2 MG/ML
4 INJECTION INTRAMUSCULAR; INTRAVENOUS EVERY 4 HOURS PRN
Status: DISCONTINUED | OUTPATIENT
Start: 2022-05-21 | End: 2022-05-22 | Stop reason: HOSPADM

## 2022-05-21 RX ORDER — ASPIRIN 81 MG/1
162 TABLET, CHEWABLE ORAL DAILY
Status: DISCONTINUED | OUTPATIENT
Start: 2022-05-21 | End: 2022-05-21

## 2022-05-21 RX ORDER — HYDRALAZINE HYDROCHLORIDE 20 MG/ML
10 INJECTION INTRAMUSCULAR; INTRAVENOUS EVERY 4 HOURS PRN
Status: DISCONTINUED | OUTPATIENT
Start: 2022-05-21 | End: 2022-05-22 | Stop reason: HOSPADM

## 2022-05-21 RX ORDER — OMEPRAZOLE 20 MG/1
20 CAPSULE, DELAYED RELEASE ORAL
Status: DISCONTINUED | OUTPATIENT
Start: 2022-05-21 | End: 2022-05-22 | Stop reason: HOSPADM

## 2022-05-21 RX ORDER — AMOXICILLIN 250 MG
2 CAPSULE ORAL 2 TIMES DAILY
Status: DISCONTINUED | OUTPATIENT
Start: 2022-05-21 | End: 2022-05-22 | Stop reason: HOSPADM

## 2022-05-21 RX ORDER — ASPIRIN 81 MG/1
162 TABLET, CHEWABLE ORAL DAILY
Status: DISCONTINUED | OUTPATIENT
Start: 2022-05-22 | End: 2022-05-22 | Stop reason: HOSPADM

## 2022-05-21 RX ORDER — ONDANSETRON 4 MG/1
4 TABLET, ORALLY DISINTEGRATING ORAL EVERY 4 HOURS PRN
Status: DISCONTINUED | OUTPATIENT
Start: 2022-05-21 | End: 2022-05-22 | Stop reason: HOSPADM

## 2022-05-21 RX ORDER — METFORMIN HYDROCHLORIDE 500 MG/1
1000 TABLET, EXTENDED RELEASE ORAL 2 TIMES DAILY
Status: DISCONTINUED | OUTPATIENT
Start: 2022-05-21 | End: 2022-05-22 | Stop reason: HOSPADM

## 2022-05-21 RX ADMIN — METFORMIN HYDROCHLORIDE 1000 MG: 500 TABLET, EXTENDED RELEASE ORAL at 17:36

## 2022-05-21 RX ADMIN — ATORVASTATIN CALCIUM 80 MG: 40 TABLET, FILM COATED ORAL at 17:21

## 2022-05-21 RX ADMIN — LORAZEPAM 2 MG: 2 INJECTION INTRAMUSCULAR; INTRAVENOUS at 18:15

## 2022-05-21 RX ADMIN — ONDANSETRON 4 MG: 2 INJECTION INTRAMUSCULAR; INTRAVENOUS at 13:13

## 2022-05-21 RX ADMIN — IOHEXOL 80 ML: 350 INJECTION, SOLUTION INTRAVENOUS at 19:01

## 2022-05-21 RX ADMIN — SODIUM CHLORIDE: 9 INJECTION, SOLUTION INTRAVENOUS at 15:50

## 2022-05-21 RX ADMIN — SODIUM CHLORIDE: 9 INJECTION, SOLUTION INTRAVENOUS at 23:21

## 2022-05-21 RX ADMIN — SODIUM CHLORIDE: 9 INJECTION, SOLUTION INTRAVENOUS at 13:04

## 2022-05-21 ASSESSMENT — FIBROSIS 4 INDEX: FIB4 SCORE: 1.35

## 2022-05-21 ASSESSMENT — LIFESTYLE VARIABLES
AVERAGE NUMBER OF DAYS PER WEEK YOU HAVE A DRINK CONTAINING ALCOHOL: 1
TOTAL SCORE: 0
ON A TYPICAL DAY WHEN YOU DRINK ALCOHOL HOW MANY DRINKS DO YOU HAVE: 0
HOW MANY TIMES IN THE PAST YEAR HAVE YOU HAD 5 OR MORE DRINKS IN A DAY: 0
TOTAL SCORE: 0
TOTAL SCORE: 0
HAVE YOU EVER FELT YOU SHOULD CUT DOWN ON YOUR DRINKING: NO
EVER HAD A DRINK FIRST THING IN THE MORNING TO STEADY YOUR NERVES TO GET RID OF A HANGOVER: NO
ALCOHOL_USE: YES
CONSUMPTION TOTAL: NEGATIVE
HAVE PEOPLE ANNOYED YOU BY CRITICIZING YOUR DRINKING: NO
EVER FELT BAD OR GUILTY ABOUT YOUR DRINKING: NO
SUBSTANCE_ABUSE: 0

## 2022-05-21 ASSESSMENT — COGNITIVE AND FUNCTIONAL STATUS - GENERAL
SUGGESTED CMS G CODE MODIFIER MOBILITY: CI
SUGGESTED CMS G CODE MODIFIER DAILY ACTIVITY: CH
MOBILITY SCORE: 23
DAILY ACTIVITIY SCORE: 24
WALKING IN HOSPITAL ROOM: A LITTLE

## 2022-05-21 ASSESSMENT — ENCOUNTER SYMPTOMS
PALPITATIONS: 0
BLOOD IN STOOL: 0
LOSS OF CONSCIOUSNESS: 0
GASTROINTESTINAL NEGATIVE: 1
BRUISES/BLEEDS EASILY: 0
COUGH: 0
HEADACHES: 1
HEMOPTYSIS: 0
NAUSEA: 0
SHORTNESS OF BREATH: 1
CHILLS: 0
MEMORY LOSS: 0
HEARTBURN: 0
ABDOMINAL PAIN: 0
NERVOUS/ANXIOUS: 0
DIZZINESS: 1
DIARRHEA: 0
FOCAL WEAKNESS: 1
VOMITING: 0
PSYCHIATRIC NEGATIVE: 1
MUSCULOSKELETAL NEGATIVE: 1
DIAPHORESIS: 0
SEIZURES: 0
EYES NEGATIVE: 1
FEVER: 0
CONSTIPATION: 0
CARDIOVASCULAR NEGATIVE: 1
DEPRESSION: 0
DOUBLE VISION: 0
WHEEZING: 0

## 2022-05-21 ASSESSMENT — PATIENT HEALTH QUESTIONNAIRE - PHQ9
SUM OF ALL RESPONSES TO PHQ9 QUESTIONS 1 AND 2: 0
1. LITTLE INTEREST OR PLEASURE IN DOING THINGS: NOT AT ALL
2. FEELING DOWN, DEPRESSED, IRRITABLE, OR HOPELESS: NOT AT ALL

## 2022-05-21 NOTE — ED TRIAGE NOTES
"Presents accompanied by family.  Pt C/O dizziness recurring since this AM.  She also describes experiencing left facial numbness extending to he left arm, and blurred vision persisting for the past 3 weeks.  She does not rep[ort any facial drooping, focal weakness, or speech abnormalities.  Chief Complaint   Patient presents with   • Dizziness   • Numbness   • Blurred Vision   • Back Pain     /84   Pulse 74   Temp 36.1 °C (97 °F) (Temporal)   Resp 20   Ht 1.6 m (5' 3\")   Wt 69 kg (152 lb 1.9 oz)   LMP 12/05/2002   SpO2 98%   BMI 26.95 kg/m²   Has this patient been vaccinated for COVID YES  If not, would they like to be vaccinated while in the ER if eligible?  N/A  Would the patient like to speak with the ERP about the possibility of receiving the COVID vaccine today before making a decision? N/A   "

## 2022-05-21 NOTE — PROGRESS NOTES
4 Eyes Skin Assessment Completed by Polo RN and JESSE Brenner.    Head WDL  Ears WDL  Nose WDL  Mouth WDL  Neck WDL  Breast/Chest WDL  Shoulder Blades WDL  Spine WDL  (R) Arm/Elbow/Hand WDL  (L) Arm/Elbow/Hand WDL  Abdomen WDL  Groin WDL  Scrotum/Coccyx/Buttocks WDL  (R) Leg WDL  (L) Leg WDL  (R) Heel/Foot/Toe WDL  (L) Heel/Foot/Toe WDL          Devices In Places Tele Box      Interventions In Place Q2 Turns    Possible Skin Injury No    Pictures Uploaded Into Epic N/A  Wound Consult Placed N/A  RN Wound Prevention Protocol Ordered No

## 2022-05-21 NOTE — ASSESSMENT & PLAN NOTE
[facility] called she is enrolled in palliative care NP Margowilda Whitaker can be reach at [phone].     9/30/21    ASSESSMENT:  · COPD, severe  · Chronic hypoxic respiratory failure  · Prior tobacco abuse: quit 2012. 120 pack years  · PAF on amiodarone and eliquis  · Chronic Anticoagulation  · H/o TIA, brain aneurysm that is monitored  · DEVI -not treated per pt unable to tolerate PAP     PLAN:   · supplemental 2lpm with exertion and nocturnal  · PRN albuterol INH and continue nebulizer  · Continue Dulera 100 and Tudorza   · On Eliquis for anticoagulation.    · Pt interested in excite device  · F/u in 6 mo for COPD and in 12 months for her annual LDCT Patient reports 3 weeks of left-sided weakness with headaches.  The patient says it goes down her left arm with some numbness.  Initial CAT scan head negative  Patient is outside the window for any intervention  MRI of the head has been requested to evaluate for possible stroke  CT of the neck has been requested for possible carotid problems  Echocardiogram for possible intracardiac clot  Aspirin statin  PT OT evaluation and speech evaluation

## 2022-05-21 NOTE — ASSESSMENT & PLAN NOTE
Optimize blood pressure management keep systolic blood pressure less than 140 diastolic under 90  Evaluate for orthostatic hypotension

## 2022-05-21 NOTE — PROGRESS NOTES
Received patient from ED. Awake and alert. Ambulated to bed with hand held assistance. Placed on telemetry box. Explained use of call bell.

## 2022-05-21 NOTE — ED PROVIDER NOTES
ED Provider Note    CHIEF COMPLAINT  Chief Complaint   Patient presents with   • Dizziness   • Numbness   • Blurred Vision   • Back Pain       HPI  Elizabeth Blunt is a 66 y.o. female who presents for evaluation of facial pain dizziness and numbness.  The patient's  indicates that she has been having symptoms for couple months.  Planes of pain to the left side of her face extending to the left side of her head along with some blurred vision and dizziness.  She states that sometimes she feels like things are spinning around but also feels like she is off balance when she walks.  Symptoms became worse today around 11:00.  The patient has been seen by a dentist and was told that she had no evidence of any dental infections or dental abnormalities causing her pain.  No other acute symptomatology or complaints.    REVIEW OF SYSTEMS  See HPI for further details. All other systems negative.    PAST MEDICAL HISTORY  Past Medical History:   Diagnosis Date   • Allergic rhinitis    • Dalton's esophagus 4/14/2017   • Dental disorder     partial upper   • Diabetes mellitus without complication (HCC) 11/24/2015   • Dyslipidemia    • Essential hypertension 8/25/2015   • GERD (gastroesophageal reflux disease) 5/7/2009   • HTN    • Low back pain    • Migraine headache    • Tear of medial cartilage or meniscus of knee, current 2/13/2014       FAMILY HISTORY  Family History   Problem Relation Age of Onset   • Hypertension Mother    • Cancer Mother 85        colon   • Hypertension Father    • Diabetes Father    • Cancer Cousin    • No Known Problems Son    • No Known Problems Son        SOCIAL HISTORY  Resides locally;    SURGICAL HISTORY  Past Surgical History:   Procedure Laterality Date   • ROTATOR CUFF REPAIR Left 08/2017   • OREN BY LAPAROSCOPY Bilateral 8/3/2016    Procedure: OREN BY LAPAROSCOPY;  Surgeon: Scott Canales M.D.;  Location: SURGERY SAME DAY [facility];  Service:    • KNEE ARTHROSCOPY  2/13/2014     "Performed by Wiley Kenyon M.D. at SURGERY [facility]S   • MENISCECTOMY, KNEE, MEDIAL  2/13/2014    Performed by Wiley Kenyon M.D. at SURGERY [facility]   • SEPTOTURBINOPLASTY  3/30/2010    Performed by TRAV MENJIVAR at SURGERY SAME DAY [facility] ORS   • SIGMOIDOSCOPY FLEX  3/10    normal   • COLONOSCOPY  3/09    normal   • ORIF, ANKLE  5/07   • APPENDECTOMY  2003   • ABDOMINAL HYSTERECTOMY TOTAL  2002    partial due to fibroids       CURRENT MEDICATIONS  See nurses notes    ALLERGIES  No Known Allergies    PHYSICAL EXAM  VITAL SIGNS: /84   Pulse 74   Temp 36.1 °C (97 °F) (Temporal)   Resp 20   Ht 1.6 m (5' 3\")   Wt 69 kg (152 lb 1.9 oz)   LMP 12/05/2002   SpO2 98%   BMI 26.95 kg/m²    Constitutional: 66-year-old female, awake, anxious, oriented x3  HENT: ,Atraumatic, Bilateral external ears normal, tympanic membranes clear, Oropharynx moist, No oral exudates, Nose normal.  No evidence of intraoral infections identified and no masses palpated  Eyes: PERRL, EOMI, Conjunctiva normal, No discharge.   Neck: Normal range of motion, No tenderness, Supple, No stridor.   Lymphatic: No lymphadenopathy noted.   Cardiovascular: Normal heart rate, Normal rhythm, No murmurs, No rubs, No gallops.   Thorax & Lungs: Normal Equal breath sounds, No respiratory distress, No wheezing, no stridor, no rales. No chest tenderness.   Abdomen: Soft, nontender, nondistended, no organomegaly, positive bowel sounds normal in quality. No guarding or rebound.  Skin: Good skin turgor, pink, warm, dry. No rashes, petechiae, purpura. Normal capillary refill.   Back: No tenderness, No CVA tenderness.   Extremities: Intact distal pulses, No edema, No tenderness, No cyanosis, No clubbing. Vascular: Pulses are 2+, symmetric in the upper and lower extremities.  Musculoskeletal: Diffuse arthritic changes. No tenderness to palpation or major deformities noted.   Neurologic: Alert & oriented x 3, Normal motor function, Normal sensory " function, No gross focal deficits noted.  NIH stroke scale 0;  Psychiatric: Affect normal, Judgment normal, Mood normal.     EKG  I have interpreted: Rate 75, rhythm sinus, left axis deviation, incomplete right bundle branch block pattern, no acute STEMI, twelve-lead EKG, no old tracing for comparison;    RADIOLOGY/PROCEDURES  CT-HEAD W/O   Final Result         1. No acute intracranial abnormality. No evidence of acute intracranial hemorrhage or mass lesion.                           COURSE & MEDICAL DECISION MAKING  Pertinent Labs & Imaging studies reviewed. (See chart for details)  1.  Saline lock    Laboratory studies: CBC and differential within normal; CMP shows sodium 133, random glucose 166 otherwise within normal limits; troponin less than 6; coags normal;    Discussion/consultation: At this time, the patient presents with cephalgia over the left side of her head face along with dizziness.  Some of the patient's symptoms appear to be vertiginous in nature though further questioning reveals that this also could be ataxia but is not clear-cut.  The patient has an NIH stroke scale of 0.  Possibility of a stroke is considered so I spoke with hospitalist on-call to admit the patient for observation MRI testing.    FINAL IMPRESSION  1. Dizziness    2. Nausea    3. Nonintractable headache, unspecified chronicity pattern, unspecified headache type           PLAN  1.  The patient will be admitted for further monitoring, treatment, and care.    Electronically signed by: Guy G Gansert, M.D., 5/21/2022 12:17 PM

## 2022-05-21 NOTE — H&P
Hospital Medicine History & Physical Note    Date of Service  5/21/2022    Primary Care Physician  WILLIAM Velarde    Consultants  None    Specialist Names: None    Code Status  Full Code    Chief Complaint  Chief Complaint   Patient presents with   • Dizziness   • Numbness   • Blurred Vision   • Back Pain       History of Presenting Illness  Elizabeth Blunt is a 66 y.o. female who presented 5/21/2022 with left-sided weakness of the face and left upper extremity.  Patient states symptoms began 3 to 4 weeks ago.  Patient's symptoms at this point concurrent with a possible stroke.  MRI of the head has been requested echocardiogram has been requested.  We will do CT of the neck.  PT OT evaluations will be done.  Speech evaluation will be done.  The patient will be on aspirin and statin.  Monitor blood pressure and adjust medications to control blood pressure optimization.    I discussed the plan of care with patient, family, bedside RN,  and Emergency room physician.    Review of Systems  Review of Systems   Constitutional: Positive for malaise/fatigue. Negative for chills, diaphoresis and fever.   HENT: Negative.    Eyes: Negative.  Negative for double vision.   Respiratory: Positive for shortness of breath. Negative for cough, hemoptysis and wheezing.    Cardiovascular: Negative.  Negative for chest pain, palpitations and leg swelling.   Gastrointestinal: Negative.  Negative for abdominal pain, blood in stool, constipation, diarrhea, heartburn, nausea and vomiting.   Genitourinary: Negative.  Negative for frequency, hematuria and urgency.   Musculoskeletal: Negative.  Negative for joint pain.   Skin: Negative.  Negative for itching and rash.   Neurological: Positive for dizziness, focal weakness (Left upper extremity and left side of face) and headaches. Negative for seizures and loss of consciousness.   Endo/Heme/Allergies: Negative.  Does not bruise/bleed easily.   Psychiatric/Behavioral:  Negative.  Negative for depression, memory loss, substance abuse and suicidal ideas. The patient is not nervous/anxious.    All other systems reviewed and are negative.      Past Medical History   has a past medical history of Allergic rhinitis, Dalton's esophagus (4/14/2017), Dental disorder, Diabetes mellitus without complication (HCC) (11/24/2015), Dyslipidemia, Essential hypertension (8/25/2015), GERD (gastroesophageal reflux disease) (5/7/2009), HTN, Low back pain, Migraine headache, and Tear of medial cartilage or meniscus of knee, current (2/13/2014).    Surgical History   has a past surgical history that includes sigmoidoscopy flex (3/10); orif, ankle (5/07); colonoscopy (3/09); appendectomy (2003); abdominal hysterectomy total (2002); septoturbinoplasty (3/30/2010); knee arthroscopy (2/13/2014); meniscectomy, knee, medial (2/13/2014); jairo by laparoscopy (Bilateral, 8/3/2016); and rotator cuff repair (Left, 08/2017).     Family History  family history includes Cancer in her cousin; Cancer (age of onset: 85) in her mother; Diabetes in her father; Hypertension in her father and mother; No Known Problems in her son and son.   Family history reviewed with patient. There is no family history that is pertinent to the chief complaint.     Social History   reports that she quit smoking about 16 years ago. Her smoking use included cigarettes. She has a 10.00 pack-year smoking history. She has never used smokeless tobacco. She reports previous alcohol use. She reports that she does not use drugs.    Allergies  No Known Allergies    Medications  Prior to Admission Medications   Prescriptions Last Dose Informant Patient Reported? Taking?   amLODIPine (NORVASC) 10 MG Tab 5/21/2022 at am  No No   Sig: TAKE 1 TABLET DAILY   hydroCHLOROthiazide (HYDRODIURIL) 12.5 MG tablet 5/21/2022 at am  No No   Sig: Take 1 Tablet by mouth every day.   losartan (COZAAR) 100 MG Tab 5/21/2022 at am  No No   Sig: TAKE 1 TABLET DAILY    metFORMIN ER (GLUCOPHAGE XR) 500 MG TABLET SR 24 HR 5/21/2022 at am  No No   Sig: Take 2 Tablets by mouth 2 times a day.   omeprazole (PRILOSEC) 20 MG delayed-release capsule 5/19/2022 at unk  No No   Sig: Take 1 Capsule by mouth every day.   Patient taking differently: Take 20 mg by mouth 1 time a day as needed.   pioglitazone (ACTOS) 45 MG Tab 5/21/2022 at am  No No   Sig: Take 1 Tablet by mouth every day.   simvastatin (ZOCOR) 40 MG Tab 5/20/2022 at pm  No No   Sig: TAKE 1 TABLET EVERY EVENING      Facility-Administered Medications: None       Physical Exam  Temp:  [36.1 °C (97 °F)-36.3 °C (97.4 °F)] 36.3 °C (97.4 °F)  Pulse:  [72-74] 72  Resp:  [16-20] 16  BP: (125-131)/(76-84) 125/76  SpO2:  [95 %-98 %] 95 %  Blood Pressure : 125/76   Temperature: 36.3 °C (97.4 °F)   Pulse: 72   Respiration: 16   Pulse Oximetry: 95 %       Physical Exam  Vitals and nursing note reviewed. Exam conducted with a chaperone present.   Constitutional:       Appearance: Normal appearance. She is well-developed and underweight. She is ill-appearing.   HENT:      Head: Normocephalic and atraumatic.      Right Ear: Tympanic membrane, ear canal and external ear normal.      Left Ear: Tympanic membrane, ear canal and external ear normal.      Nose: Nose normal. No congestion or rhinorrhea.      Mouth/Throat:      Mouth: Mucous membranes are moist.      Pharynx: Oropharynx is clear.   Eyes:      Extraocular Movements: Extraocular movements intact.      Conjunctiva/sclera: Conjunctivae normal.      Pupils: Pupils are equal, round, and reactive to light.   Neck:      Thyroid: No thyroid mass.      Vascular: No carotid bruit or JVD.   Cardiovascular:      Rate and Rhythm: Normal rate and regular rhythm.      Pulses: Normal pulses.      Heart sounds: Normal heart sounds, S1 normal and S2 normal.   Pulmonary:      Effort: Pulmonary effort is normal.      Breath sounds: Normal breath sounds and air entry.   Chest:   Breasts:      Right: No  supraclavicular adenopathy.      Left: No supraclavicular adenopathy.       Abdominal:      General: Abdomen is flat. Bowel sounds are normal.      Palpations: Abdomen is soft.   Musculoskeletal:         General: Normal range of motion.      Cervical back: Normal range of motion and neck supple. No edema or rigidity.      Right lower leg: No edema.      Left lower leg: No edema.      Right foot: Normal range of motion. No deformity or foot drop.      Left foot: Normal range of motion. No deformity or foot drop.   Feet:      Right foot:      Skin integrity: Skin integrity normal. No ulcer.      Left foot:      Skin integrity: Skin integrity normal. No ulcer.   Lymphadenopathy:      Head:      Right side of head: No submental adenopathy.      Left side of head: No submental adenopathy.      Cervical: No cervical adenopathy.      Right cervical: No superficial cervical adenopathy.     Left cervical: No superficial cervical adenopathy.      Upper Body:      Right upper body: No supraclavicular adenopathy.      Left upper body: No supraclavicular adenopathy.      Lower Body: No right inguinal adenopathy. No left inguinal adenopathy.   Skin:     General: Skin is warm and dry.      Capillary Refill: Capillary refill takes less than 2 seconds.      Findings: No abrasion or wound.   Neurological:      General: No focal deficit present.      Mental Status: She is alert and oriented to person, place, and time. Mental status is at baseline.      GCS: GCS eye subscore is 4. GCS verbal subscore is 5. GCS motor subscore is 6.      Cranial Nerves: Cranial nerves are intact.      Sensory: Sensation is intact.      Motor: Motor function is intact. No weakness.      Coordination: Coordination is intact.   Psychiatric:         Mood and Affect: Mood and affect normal.         Speech: Speech normal.         Behavior: Behavior normal. Behavior is cooperative.         Thought Content: Thought content normal.         Judgment: Judgment  normal.         Laboratory:  Recent Labs     05/21/22  1235   WBC 7.1   RBC 5.41*   HEMOGLOBIN 15.7   HEMATOCRIT 47.7*   MCV 88.2   MCH 29.0   MCHC 32.9*   RDW 43.7   PLATELETCT 284   MPV 9.3     Recent Labs     05/21/22  1235   SODIUM 133*   POTASSIUM 3.6   CHLORIDE 97   CO2 22   GLUCOSE 166*   BUN 10   CREATININE 0.47*   CALCIUM 9.4     Recent Labs     05/21/22  1235   ALTSGPT 23   ASTSGOT 23   ALKPHOSPHAT 90   TBILIRUBIN 0.5   GLUCOSE 166*     Recent Labs     05/21/22  1235   APTT 25.5   INR 0.83*     No results for input(s): NTPROBNP in the last 72 hours.      Recent Labs     05/21/22  1235   TROPONINT <6       Imaging:  CT-HEAD W/O   Final Result         1. No acute intracranial abnormality. No evidence of acute intracranial hemorrhage or mass lesion.                     MR-BRAIN-W/O    (Results Pending)   EC-ECHOCARDIOGRAM COMPLETE W/O CONT    (Results Pending)   CT-CTA NECK WITH & W/O-POST PROCESSING    (Results Pending)       X-Ray:  I have personally reviewed the images and compared with prior images.  EKG:  I have personally reviewed the images and compared with prior images.  CT scan of the head which I have reviewed and is negative for acute CVA    Assessment/Plan:  Justification for Admission Status  I anticipate this patient is appropriate for observation status at this time because Patient has had a stroke most likely 3 weeks ago.  Patient did not report to the emergency room for weeks.  At this point imaging evaluation and therapy evaluations will be done but patient is outside the window for any intervention.    * Acute CVA (cerebrovascular accident) (HCC)- (present on admission)  Assessment & Plan  Patient reports 3 weeks of left-sided weakness with headaches.  The patient says it goes down her left arm with some numbness.  Initial CAT scan head negative  Patient is outside the window for any intervention  MRI of the head has been requested to evaluate for possible stroke  CT of the neck has been  requested for possible carotid problems  Echocardiogram for possible intracardiac clot  Aspirin statin  PT OT evaluation and speech evaluation    Type 2 diabetes mellitus with hyperglycemia, without long-term current use of insulin (HCC)- (present on admission)  Assessment & Plan  -accus with sliding scale coverage  -diabetic diet  -diabetic education  -follow glycohemoglobin levels long term  -continue with oral antihyperglycemics  -monitor for hypoglycemic episodes and adjust control if he should get low    Essential hypertension- (present on admission)  Assessment & Plan  Optimize blood pressure management keep systolic blood pressure less than 140 diastolic under 90  Evaluate for orthostatic hypotension    Mild episode of recurrent major depressive disorder (HCC)- (present on admission)  Assessment & Plan  None currently not depressed  Not suicidal homicidal    Dalton's esophagus- (present on admission)  Assessment & Plan  PPI therapy    GERD (gastroesophageal reflux disease)- (present on admission)  Assessment & Plan  PPI therapy    Dyslipidemia- (present on admission)  Assessment & Plan  Low-fat low-cholesterol diet  Statin  Fasting lipid panel        VTE prophylaxis: SCDs/TEDs

## 2022-05-21 NOTE — DISCHARGE PLANNING
"  Met with . Pt was talking to Dr. Hannah.  said that pta , pt was independent with ADLs and IADLs. She never used any DMEs .  would always drive for her.  said that pt is his caregiver, \"she always take care of me\".    They live in a 2 story house but the master bedroom is on the first floor.     Confirmed that PCP is Dr. Barrios and they use [facility] in [place].     Addendum:   is aware of pt's room number.    Care Transition Team Assessment    Information Source  Orientation Level: Oriented X4  Information Given By: Patient  Informant's Name: Elizabeth  Who is responsible for making decisions for patient? : Patient    Readmission Evaluation  Is this a readmission?: No    Interdisciplinary Discharge Planning  Does Admitting Nurse Feel This Could be a Complex Discharge?: No  Primary Care Physician: Dr. Barrios  Lives with - Patient's Self Care Capacity: Spouse  Support Systems: Spouse / Significant Other  Housing / Facility: 2 Story House  Do You Take your Prescribed Medications Regularly: Yes  Able to Return to Previous ADL's: Future Time w/Therapy  Mobility Issues: No  Prior Services: None, Home-Independent  Patient Prefers to be Discharged to:: Home Health  Assistance Needed: No  Durable Medical Equipment: Not Applicable    Discharge Preparedness  What is your plan after discharge?: Home health care  What are your discharge supports?: Spouse  Prior Functional Level: Ambulatory, Drives Self, Independent with Activities of Daily Living, Independent with Medication Management  Difficulity with ADLs: Walking  Difficulity with IADLs: Driving    Functional Assesment  Prior Functional Level: Ambulatory, Drives Self, Independent with Activities of Daily Living, Independent with Medication Management    Finances  Financial Barriers to Discharge: No  Prescription Coverage: Yes    Vision / Hearing Impairment  Vision Impairment : Yes  Hearing Impairment : No    Advance Directive  Advance " Directive?: None    Domestic Abuse  Have you ever been the victim of abuse or violence?: No    Discharge Risks or Barriers  Discharge risks or barriers?: Post-acute placement / services  Patient risk factors: Vulnerable adult    Anticipated Discharge Information  Discharge Disposition: D/T to home under A care in anticipation of covered skilled care (06)

## 2022-05-22 VITALS
HEIGHT: 63 IN | RESPIRATION RATE: 18 BRPM | DIASTOLIC BLOOD PRESSURE: 62 MMHG | BODY MASS INDEX: 26.95 KG/M2 | OXYGEN SATURATION: 96 % | TEMPERATURE: 97.6 F | WEIGHT: 152.12 LBS | HEART RATE: 75 BPM | SYSTOLIC BLOOD PRESSURE: 107 MMHG

## 2022-05-22 LAB
CHOLEST SERPL-MCNC: 176 MG/DL (ref 100–199)
GLUCOSE BLD STRIP.AUTO-MCNC: 158 MG/DL (ref 65–99)
HDLC SERPL-MCNC: 51 MG/DL
LDLC SERPL CALC-MCNC: 92 MG/DL
TRIGL SERPL-MCNC: 164 MG/DL (ref 0–149)

## 2022-05-22 PROCEDURE — 82962 GLUCOSE BLOOD TEST: CPT

## 2022-05-22 PROCEDURE — 97535 SELF CARE MNGMENT TRAINING: CPT | Performed by: PHYSICAL THERAPIST

## 2022-05-22 PROCEDURE — 700105 HCHG RX REV CODE 258: Performed by: EMERGENCY MEDICINE

## 2022-05-22 PROCEDURE — 99217 PR OBSERVATION CARE DISCHARGE: CPT | Performed by: HOSPITALIST

## 2022-05-22 PROCEDURE — G0378 HOSPITAL OBSERVATION PER HR: HCPCS

## 2022-05-22 PROCEDURE — A9270 NON-COVERED ITEM OR SERVICE: HCPCS | Performed by: HOSPITALIST

## 2022-05-22 PROCEDURE — 700102 HCHG RX REV CODE 250 W/ 637 OVERRIDE(OP): Performed by: HOSPITALIST

## 2022-05-22 PROCEDURE — 80061 LIPID PANEL: CPT

## 2022-05-22 PROCEDURE — 97161 PT EVAL LOW COMPLEX 20 MIN: CPT | Performed by: PHYSICAL THERAPIST

## 2022-05-22 PROCEDURE — 96372 THER/PROPH/DIAG INJ SC/IM: CPT

## 2022-05-22 RX ORDER — PROPRANOLOL HYDROCHLORIDE 10 MG/1
10 TABLET ORAL 3 TIMES DAILY
Qty: 90 TABLET | Refills: 11 | Status: SHIPPED | OUTPATIENT
Start: 2022-05-22 | End: 2023-01-16

## 2022-05-22 RX ORDER — LANOLIN ALCOHOL/MO/W.PET/CERES
400 CREAM (GRAM) TOPICAL 2 TIMES DAILY
Qty: 60 TABLET | Refills: 3 | Status: SHIPPED | OUTPATIENT
Start: 2022-05-22 | End: 2022-05-22 | Stop reason: SDUPTHER

## 2022-05-22 RX ORDER — AMOXICILLIN AND CLAVULANATE POTASSIUM 875; 125 MG/1; MG/1
1 TABLET, FILM COATED ORAL 2 TIMES DAILY
Qty: 10 TABLET | Refills: 0 | Status: SHIPPED | OUTPATIENT
Start: 2022-05-22 | End: 2022-05-27

## 2022-05-22 RX ORDER — ASPIRIN 81 MG/1
162 TABLET, CHEWABLE ORAL DAILY
Qty: 100 TABLET | Refills: 3 | Status: SHIPPED | OUTPATIENT
Start: 2022-05-23 | End: 2022-05-22 | Stop reason: SDUPTHER

## 2022-05-22 RX ORDER — LANOLIN ALCOHOL/MO/W.PET/CERES
400 CREAM (GRAM) TOPICAL 2 TIMES DAILY
Qty: 60 TABLET | Refills: 3 | Status: SHIPPED | OUTPATIENT
Start: 2022-05-22 | End: 2022-06-21

## 2022-05-22 RX ORDER — ASPIRIN 81 MG/1
162 TABLET, CHEWABLE ORAL DAILY
Qty: 100 TABLET | Refills: 3 | Status: SHIPPED | OUTPATIENT
Start: 2022-05-23 | End: 2023-01-16

## 2022-05-22 RX ORDER — PROPRANOLOL HYDROCHLORIDE 10 MG/1
10 TABLET ORAL 3 TIMES DAILY
Qty: 90 TABLET | Refills: 11 | Status: SHIPPED | OUTPATIENT
Start: 2022-05-22 | End: 2022-05-22 | Stop reason: SDUPTHER

## 2022-05-22 RX ORDER — AMOXICILLIN AND CLAVULANATE POTASSIUM 875; 125 MG/1; MG/1
1 TABLET, FILM COATED ORAL 2 TIMES DAILY
Qty: 10 TABLET | Refills: 0 | Status: SHIPPED | OUTPATIENT
Start: 2022-05-22 | End: 2022-05-22 | Stop reason: SDUPTHER

## 2022-05-22 RX ADMIN — ACETAMINOPHEN 650 MG: 325 TABLET, FILM COATED ORAL at 09:10

## 2022-05-22 RX ADMIN — SODIUM CHLORIDE: 9 INJECTION, SOLUTION INTRAVENOUS at 06:29

## 2022-05-22 RX ADMIN — ASPIRIN 81 MG CHEWABLE TABLET 162 MG: 81 TABLET CHEWABLE at 06:26

## 2022-05-22 ASSESSMENT — COGNITIVE AND FUNCTIONAL STATUS - GENERAL
SUGGESTED CMS G CODE MODIFIER MOBILITY: CH
MOBILITY SCORE: 24

## 2022-05-22 ASSESSMENT — GAIT ASSESSMENTS
GAIT LEVEL OF ASSIST: SUPERVISED
DEVIATION: DECREASED HEEL STRIKE;DECREASED TOE OFF
DISTANCE (FEET): 100

## 2022-05-22 NOTE — THERAPY
Physical Therapy   Initial Evaluation     Patient Name: Elizabeth Blunt  Age:  66 y.o., Sex:  female  Medical Record #: [id number]  Today's Date: 5/22/2022          Assessment  Patient is 66 y.o. female with a diagnosis of dizziness and blurred vision as well as L sided weakness in the face, and UE for 3-4 weeks prior to admit..  Additional factors influencing patient status / progress include her PMH of concepcion's esophagus  DM 2, HPTN, GERD, LBP.  Pt has had more stress in her life with her husbands health also a issue. She feels her diabetes has not been as controlled as it should be. Pt is safe with bed mobility, transfers and gait without an AD today . She still has some blurred vision but she states it is improved. Pt has no further PT needs at this time.     Plan    Recommend Physical Therapy for Evaluation only for the following treatments:  Bed Mobility, Gait Training, Self Care/Home Evaluation, Therapeutic Activities, and Therapeutic Exercises    DC Equipment Recommendations: None  Discharge Recommendations: Anticipate that the patient will have no further physical therapy needs after discharge from the hospital       Subjective  Pt very cooperative and able to follow VC's with good awareness of safety. Pt is A& O x 4 .           Objective        05/22/22 1300   Prior Living Situation   Prior Services None   Housing / Facility 2 Story House   Steps Into Home 10   Steps In Home 14   Bathroom Set up Walk In Shower;Grab Bars   Lives with - Patient's Self Care Capacity Spouse   Comments pt states she has been stressed as her  has had recent health issues   Prior Level of Functional Mobility   Bed Mobility Independent   Transfer Status Independent   Ambulation Independent   Distance Ambulation (Feet)   (community)   Assistive Devices Used None   Stairs Independent   Gait Analysis   Gait Level Of Assist Supervised   Assistive Device None   Distance (Feet) 100   # of Times Distance was Traveled 2   Deviation  Decreased Heel Strike;Decreased Toe Off   # of Stairs Climbed 0   Weight Bearing Status WBAT   Vision Deficits Impacting Mobility mild blurry vision, improving   Bed Mobility    Supine to Sit Supervised   Sit to Supine Supervised   Scooting Supervised   Comments pt able to safely getOOB and BTB   Functional Mobility   Sit to Stand Supervised   Transfer Method Stand Step   Mobility bed sit <>std, ambulate in room and hallway , BTB   Comments pt doing well today and she mentions the L sided UE and head /neck pain is gone   Anticipated Discharge Equipment and Recommendations   DC Equipment Recommendations None   Discharge Recommendations Anticipate that the patient will have no further physical therapy needs after discharge from the hospital

## 2022-05-22 NOTE — PROGRESS NOTES
Tele Shift Summary    Rhythm: NSR  Rate: 69-78  Ectopy: Frequent PVC  Measurements: /0.18/0.08/0.36/

## 2022-05-22 NOTE — DISCHARGE SUMMARY
Discharge Summary    CHIEF COMPLAINT ON ADMISSION  Chief Complaint   Patient presents with   • Dizziness   • Numbness   • Blurred Vision   • Back Pain       Reason for Admission  Dizziness; Numbness     Admission Date  5/21/2022    CODE STATUS  Full Code    HPI & HOSPITAL COURSE  Ms. Elizabeth Blunt is a 66-year-old female comes into the hospital complaining of headache, left-sided facial numbness and left arm numbness.  She says the symptoms have been ongoing for about 3 weeks.  Given the numbness and condition of the patient high concern was raised for a stroke.  Given the duration of the symptoms suspected stroke was present.  The patient underwent an initial CT of the head in the emergency room which was negative.  NIH at the time of my examination was 0.  The patient was admitted to the hospital for observation.  Under telemetric monitoring patient's cardiac status was monitored.  No abnormal rhythms were observed under monitoring.  Patient was evaluated an echocardiogram which shows a normal echocardiogram.  Patient underwent a CTA of the neck which shows less than 50% stenosis bilaterally.  No flow-limiting lesions were observed.  Patient then underwent an MRI of the brain as well.  MRI of the brain shows chronic white matter changes but no acute stroke, bleeding, mass or shift.  The patient's sinus does at this point have some abnormal findings suggestive of sinusitis.  The patient does tell me she is has chronic sinusitis and she has been evaluated by ENT and had surgery on her sinuses in the past as well.  At this point I will refer her to ENT.  We will place the patient on Augmentin for sinusitis.  The patient does have chronic migraines which most likely is causing her neurological deficits.  At this point I am going to place the patient on propranolol and magnesium.  I will send the patient to the headache follow-up clinic.  Patient otherwise is improved.  She is at this point alert awake oriented x3  pleasant cooperative female understands her discharge plan instructions.  Patient will continue her management as an outpatient.    At this point stroke has been ruled out.    Therefore, she is discharged in good and stable condition to home with close outpatient follow-up.    The patient recovered much more quickly than anticipated on admission.    Discharge Date  5/22/2022    FOLLOW UP ITEMS POST DISCHARGE  Follow-up with the primary care physician in 2 to 3 days  Follow-up with ENT, referral has been placed  Follow-up with neurology for the headache clinic, referral has been placed    DISCHARGE DIAGNOSES  Principal Problem:    Acute CVA (cerebrovascular accident) (HCC) POA: Yes  Active Problems:    Essential hypertension POA: Yes    Type 2 diabetes mellitus with hyperglycemia, without long-term current use of insulin (HCC) POA: Yes      Overview: Trulicity - intolerance, dizzy, vomiting    Dyslipidemia POA: Yes    GERD (gastroesophageal reflux disease) POA: Yes    Dalton's esophagus POA: Yes    Mild episode of recurrent major depressive disorder (HCC) POA: Yes  Resolved Problems:    * No resolved hospital problems. *      FOLLOW UP  Future Appointments   Date Time Provider Department Center   7/5/2022 10:00 AM WILLIAM Velarde [facility]   7/27/2022 10:00 AM Wiley Kenyon M.D. [facility] [address]     No follow-up provider specified.    MEDICATIONS ON DISCHARGE     Medication List      START taking these medications      Instructions   amoxicillin-clavulanate 875-125 MG Tabs  Commonly known as: AUGMENTIN   Take 1 Tablet by mouth 2 times a day for 5 days.  Dose: 1 Tablet     aspirin 81 MG Chew chewable tablet  Start taking on: May 23, 2022  Commonly known as: ASA  Replaces: aspirin 81 MG tablet   Chew 2 Tablets every day.  Dose: 162 mg     magnesium oxide 400 (240 Mg) MG Tabs   Take 1 Tablet by mouth 2 times a day for 30 days.  Dose: 400 mg     propranolol 10 MG Tabs  Commonly known as: INDERAL   Take 1  Tablet by mouth 3 times a day.  Dose: 10 mg        CHANGE how you take these medications      Instructions   omeprazole 20 MG delayed-release capsule  What changed:   · when to take this  · reasons to take this  Commonly known as: PRILOSEC   Take 1 Capsule by mouth every day.  Dose: 20 mg        CONTINUE taking these medications      Instructions   amLODIPine 10 MG Tabs  Commonly known as: NORVASC   TAKE 1 TABLET DAILY     hydroCHLOROthiazide 12.5 MG tablet  Commonly known as: HYDRODIURIL   Take 1 Tablet by mouth every day.  Dose: 12.5 mg     losartan 100 MG Tabs  Commonly known as: COZAAR   TAKE 1 TABLET DAILY     metFORMIN  MG Tb24  Commonly known as: GLUCOPHAGE XR   Take 2 Tablets by mouth 2 times a day.  Dose: 1,000 mg     pioglitazone 45 MG Tabs  Commonly known as: ACTOS   Take 1 Tablet by mouth every day.  Dose: 45 mg     simvastatin 40 MG Tabs  Commonly known as: ZOCOR   TAKE 1 TABLET EVERY EVENING        STOP taking these medications    aspirin 81 MG tablet  Replaced by: aspirin 81 MG Chew chewable tablet            Allergies  No Known Allergies    DIET  Orders Placed This Encounter   Procedures   • Diet Order Diet: Regular     Standing Status:   Standing     Number of Occurrences:   1     Order Specific Question:   Diet:     Answer:   Regular [1]       ACTIVITY  As tolerated.  Weight bearing as tolerated    CONSULTATIONS  None    PROCEDURES  None    LABORATORY  Lab Results   Component Value Date    SODIUM 133 (L) 05/21/2022    POTASSIUM 3.6 05/21/2022    CHLORIDE 97 05/21/2022    CO2 22 05/21/2022    GLUCOSE 166 (H) 05/21/2022    BUN 10 05/21/2022    CREATININE 0.47 (L) 05/21/2022    CREATININE 0.68 02/09/2011    GLOMRATE >59 02/09/2011        Lab Results   Component Value Date    WBC 7.1 05/21/2022    WBC 7.3 02/03/2010    HEMOGLOBIN 15.7 05/21/2022    HEMATOCRIT 47.7 (H) 05/21/2022    PLATELETCT 284 05/21/2022        Total time of the discharge process exceeds 32 minutes.

## 2022-05-22 NOTE — DISCHARGE INSTRUCTIONS
Vertigo  Vertigo is the feeling that you or the things around you are moving when they are not. This feeling can come and go at any time. Vertigo often goes away on its own. This condition can be dangerous if it happens when you are doing activities like driving or working with machines.  Your doctor will do tests to find the cause of your vertigo. These tests will also help your doctor decide on the best treatment for you.  Follow these instructions at home:  Eating and drinking         Drink enough fluid to keep your pee (urine) pale yellow.  Do not drink alcohol.  Activity  Return to your normal activities as told by your doctor. Ask your doctor what activities are safe for you.  In the morning, first sit up on the side of the bed. When you feel okay, stand slowly while you hold onto something until you know that your balance is fine.  Move slowly. Avoid sudden body or head movements or certain positions, as told by your doctor.  Use a cane if you have trouble standing or walking.  Sit down right away if you feel dizzy.  Avoid doing any tasks or activities that can cause danger to you or others if you get dizzy.  Avoid bending down if you feel dizzy. Place items in your home so that they are easy for you to reach without leaning over.  Do not drive or use heavy machinery if you feel dizzy.  General instructions  Take over-the-counter and prescription medicines only as told by your doctor.  Keep all follow-up visits as told by your doctor. This is important.  Contact a doctor if:  Your medicine does not help your vertigo.  You have a fever.  Your problems get worse or you have new symptoms.  Your family or friends see changes in your behavior.  The feeling of being sick to your stomach gets worse.  Your vomiting gets worse.  You lose feeling (have numbness) in part of your body.  You feel prickling and tingling in a part of your body.  Get help right away if:  You have trouble moving or talking.  You are always  dizzy.  You pass out (faint).  You get very bad headaches.  You feel weak in your hands, arms, or legs.  You have changes in your hearing.  You have changes in how you see (vision).  You get a stiff neck.  Bright light starts to bother you.  Summary  Vertigo is the feeling that you or the things around you are moving when they are not.  Your doctor will do tests to find the cause of your vertigo.  You may be told to avoid some tasks, positions, or movements.  Contact a doctor if your medicine is not helping, or if you have a fever, new symptoms, or a change in behavior.  Get help right away if you get very bad headaches, or if you have changes in how you speak, hear, or see.  This information is not intended to replace advice given to you by your health care provider. Make sure you discuss any questions you have with your health care provider.  Document Released: 09/26/2009 Document Revised: 11/11/2019 Document Reviewed: 11/11/2019  [employer] Patient Education © 2020 [employer]  Discharge Instructions    Discharged to home by car with relative. Discharged via wheelchair, hospital escort: Yes.  Special equipment needed: Not Applicable    Be sure to schedule a follow-up appointment with your primary care doctor or any specialists as instructed.     Discharge Plan:        I understand that a diet low in cholesterol, fat, and sodium is recommended for good health. Unless I have been given specific instructions below for another diet, I accept this instruction as my diet prescription.   Other diet: Heart Healthy    Special Instructions: None    Is patient discharged on Warfarin / Coumadin?   No     Depression / Suicide Risk    As you are discharged from this [facility] facility, it is important to learn how to keep safe from harming yourself.    Recognize the warning signs:  Abrupt changes in personality, positive or negative- including increase in energy   Giving away possessions  Change in eating patterns-  significant weight changes-  positive or negative  Change in sleeping patterns- unable to sleep or sleeping all the time   Unwillingness or inability to communicate  Depression  Unusual sadness, discouragement and loneliness  Talk of wanting to die  Neglect of personal appearance   Rebelliousness- reckless behavior  Withdrawal from people/activities they love  Confusion- inability to concentrate     If you or a loved one observes any of these behaviors or has concerns about self-harm, here's what you can do:  Talk about it- your feelings and reasons for harming yourself  Remove any means that you might use to hurt yourself (examples: pills, rope, extension cords, firearm)  Get professional help from the community (Mental Health, Substance Abuse, psychological counseling)  Do not be alone:Call your Safe Contact- someone whom you trust who will be there for you.  Call your local CRISIS HOTLINE 753-5833 or 875-038-7486  Call your local Children's Mobile Crisis Response Team [place] (927) 028-8437 or www.Dogeo  Call the toll free National Suicide Prevention Hotlines   National Suicide Prevention Lifeline 714-494-CZLF (3811)  National Hope Line Network 800-SUICIDE (469-4452)

## 2022-05-22 NOTE — CARE PLAN
The patient is Stable - Low risk of patient condition declining or worsening    Shift Goals  Clinical Goals: Observation of stroke symptoms  Patient Goals: Food and rest    Progress made toward(s) clinical / shift goals:    Problem: Pain - Standard  Goal: Alleviation of pain or a reduction in pain to the patient’s comfort goal  5/22/2022 0906 by Melani Ortega R.N.  Outcome: Progressing  5/22/2022 0906 by Melani Ortega R.N.  Outcome: Progressing       Patient is not progressing towards the following goals:

## 2022-05-25 LAB
CK BB CFR SERPL ELPH: 0 % (ref 0–0)
CK MACRO1 CFR SERPL: 0 % (ref 0–0)
CK MACRO2 CFR SERPL: 0 % (ref 0–0)
CK MB CFR SERPL ELPH: 0 % (ref 0–4)
CK MM CFR SERPL ELPH: 100 % (ref 96–100)
CK SERPL-CCNC: 119 U/L (ref 26–192)

## 2022-05-29 ENCOUNTER — HOSPITAL ENCOUNTER (EMERGENCY)
Facility: MEDICAL CENTER | Age: 67
End: 2022-05-29
Attending: EMERGENCY MEDICINE
Payer: MEDICARE

## 2022-05-29 VITALS
HEIGHT: 63 IN | SYSTOLIC BLOOD PRESSURE: 123 MMHG | DIASTOLIC BLOOD PRESSURE: 72 MMHG | TEMPERATURE: 98.6 F | OXYGEN SATURATION: 97 % | BODY MASS INDEX: 27.19 KG/M2 | RESPIRATION RATE: 16 BRPM | HEART RATE: 80 BPM | WEIGHT: 153.44 LBS

## 2022-05-29 DIAGNOSIS — G51.0 BELL'S PALSY: ICD-10-CM

## 2022-05-29 PROCEDURE — 99282 EMERGENCY DEPT VISIT SF MDM: CPT

## 2022-05-29 RX ORDER — VALACYCLOVIR HYDROCHLORIDE 1 G/1
1000 TABLET, FILM COATED ORAL 2 TIMES DAILY
Qty: 20 TABLET | Refills: 0 | Status: SHIPPED | OUTPATIENT
Start: 2022-05-29

## 2022-05-29 RX ORDER — PREDNISONE 20 MG/1
40 TABLET ORAL DAILY
Qty: 12 TABLET | Refills: 0 | Status: SHIPPED | OUTPATIENT
Start: 2022-05-29 | End: 2022-06-04

## 2022-05-29 ASSESSMENT — FIBROSIS 4 INDEX: FIB4 SCORE: 1.1

## 2022-05-29 NOTE — ED TRIAGE NOTES
"Chief Complaint   Patient presents with   • Facial Droop     Pt walk-in with spouse. Pt sent here from urgent care today due to L side facial droop. Pt states L mouth droop onset 2 days ago. Pt unable to raise L eyebrow. Equal & bilateral strength in all 4 extremities. Pt denies vision changes. Speech clear.   • Sent from Urgent Care     /77   Pulse 79   Temp 36.9 °C (98.4 °F) (Temporal)   Resp 16   Ht 1.6 m (5' 3\")   Wt 69.6 kg (153 lb 7 oz)   SpO2 96% RA    Has this patient been vaccinated for COVID?  YES  If not, would they like to be vaccinated while in the ER if eligible?  -  Would the patient like to speak with the ERP about the possibility of receiving the COVID vaccine today before making a decision?  -   "

## 2022-05-29 NOTE — ED PROVIDER NOTES
ED Provider Note    CHIEF COMPLAINT  Chief Complaint   Patient presents with   • Facial Droop     Pt walk-in with spouse. Pt sent here from urgent care today due to L side facial droop. Pt states L mouth droop onset 2 days ago. Pt unable to raise L eyebrow. Equal & bilateral strength in all 4 extremities. Pt denies vision changes. Speech clear.   • Sent from Urgent Care       HPI  Elizabeth Blunt is a 67 y.o. female who presents stating that she had some strange discomfort in her left face a couple of weeks ago but never had any rash and 2 days ago started having dysfunction of her left face not able to raise her left eyebrow or close her left eye completely and her eye is getting drier and she needed to use eyedrops this morning.  She has an asymmetric smile and again diminished sensation in the distribution of the left some cranial nerve.  Denies any other symptomatology or neurologic deficits.  She was sent from urgent care for evaluation but did not see a practitioner at that facility    REVIEW OF SYSTEMS  See HPI for further details. All other systems are negative.     PAST MEDICAL HISTORY  Past Medical History:   Diagnosis Date   • Allergic rhinitis    • Dalton's esophagus 4/14/2017   • Dental disorder     partial upper   • Diabetes mellitus without complication (HCC) 11/24/2015   • Dyslipidemia    • Essential hypertension 8/25/2015   • GERD (gastroesophageal reflux disease) 5/7/2009   • HTN    • Low back pain    • Migraine headache    • Tear of medial cartilage or meniscus of knee, current 2/13/2014       FAMILY HISTORY  Family History   Problem Relation Age of Onset   • Hypertension Mother    • Cancer Mother 85        colon   • Hypertension Father    • Diabetes Father    • Cancer Cousin    • No Known Problems Son    • No Known Problems Son        SOCIAL HISTORY   reports that she quit smoking about 16 years ago. Her smoking use included cigarettes. She has a 10.00 pack-year smoking history. She has never  "used smokeless tobacco. She reports previous alcohol use. She reports that she does not use drugs.    SURGICAL HISTORY  Past Surgical History:   Procedure Laterality Date   • ROTATOR CUFF REPAIR Left 08/2017   • OREN BY LAPAROSCOPY Bilateral 8/3/2016    Procedure: OREN BY LAPAROSCOPY;  Surgeon: Scott Canales M.D.;  Location: SURGERY SAME DAY [facility];  Service:    • KNEE ARTHROSCOPY  2/13/2014    Performed by Wiley Kenyon M.D. at SURGERY [facility] ORS   • MENISCECTOMY, KNEE, MEDIAL  2/13/2014    Performed by Wiley Kenyon M.D. at SURGERY [facility] ORS   • SEPTOTURBINOPLASTY  3/30/2010    Performed by TRAV MENJIVAR at SURGERY SAME DAY [facility] ORS   • SIGMOIDOSCOPY FLEX  3/10    normal   • COLONOSCOPY  3/09    normal   • ORIF, ANKLE  5/07   • APPENDECTOMY  2003   • ABDOMINAL HYSTERECTOMY TOTAL  2002    partial due to fibroids       CURRENT MEDICATIONS  Home Medications     Reviewed by Darlin Evans R.N. (Registered Nurse) on 05/29/22 at 1043  Med List Status: Not Addressed   Medication Last Dose Status   amLODIPine (NORVASC) 10 MG Tab  Active   aspirin (ASA) 81 MG Chew Tab chewable tablet  Active   hydroCHLOROthiazide (HYDRODIURIL) 12.5 MG tablet  Active   losartan (COZAAR) 100 MG Tab  Active   magnesium oxide 400 (240 Mg) MG Tab  Active   metFORMIN ER (GLUCOPHAGE XR) 500 MG TABLET SR 24 HR  Active   omeprazole (PRILOSEC) 20 MG delayed-release capsule  Active   pioglitazone (ACTOS) 45 MG Tab  Active   propranolol (INDERAL) 10 MG Tab  Active   simvastatin (ZOCOR) 40 MG Tab  Active                ALLERGIES  No Known Allergies    PHYSICAL EXAM  VITAL SIGNS: /72   Pulse 80   Temp 37 °C (98.6 °F) (Temporal)   Resp 16   Ht 1.6 m (5' 3\")   Wt 69.6 kg (153 lb 7 oz)   LMP 12/05/2002   SpO2 97%   BMI 27.18 kg/m²    Constitutional: Well developed, Well nourished, No acute distress, Non-toxic appearance.   HENT: Normocephalic, Atraumatic, Bilateral external ears normal, Oropharynx is clear mucous " membranes are moist. No oral exudates or nasal discharge.   Eyes: Pupils are equal round and reactive, EOMI, Conjunctiva normal, No discharge.   Neck: Normal range of motion, No tenderness, Supple, No stridor. No meningismus.  Lymphatic: No lymphadenopathy noted.   Cardiovascular: Regular rate and rhythm without murmur rub or gallop.  Thorax & Lungs: Clear breath sounds bilaterally without wheezes, rhonchi or rales. There is no chest wall tenderness.   Abdomen: Soft non-tender non-distended. There is no rebound or guarding. No organomegaly is appreciated. Bowel sounds are normal.  Skin: Normal without rash.  No evidence of shingles  Back: No CVA or spinal tenderness.   Extremities: Intact distal pulses, No edema, No tenderness, No cyanosis, No clubbing. Capillary refill is less than 2 seconds.  Musculoskeletal: Good range of motion in all major joints. No tenderness to palpation or major deformities noted.   Neurologic: Alert & oriented x 3, significantly diminished left face motor function in the distribution of 7 cranial nerve, diminished left face sensory function and distribution of 7 cranial nerve, No focal deficits noted. Reflexes are normal.  Psychiatric: Affect normal, Judgment normal, Mood normal. There is no suicidal ideation or patient reported hallucinations.       COURSE & MEDICAL DECISION MAKING  Pertinent Labs & Imaging studies reviewed. (See chart for details)  Patient arrives with signs and symptoms consistent with Bell's palsy.  She understands this sometimes has viral etiology and valacyclovir is indicated and I will prescribe her both valacyclovir and prednisone to attempt to shorten the course of the illness.  She is going to follow-up with her doctor if no significant improvement in 1 to 2 weeks and she is discharged in stable condition understands that she needs to keep the left eye lubricated and use drops during the day and ointment at night preferentially    FINAL IMPRESSION  1. Bell's  palsy             Electronically signed by: Abelardo Aceves M.D., 5/29/2022 11:45 AM

## 2022-06-08 ENCOUNTER — OFFICE VISIT (OUTPATIENT)
Dept: MEDICAL GROUP | Facility: PHYSICIAN GROUP | Age: 67
End: 2022-06-08
Payer: MEDICARE

## 2022-06-08 VITALS
HEART RATE: 82 BPM | BODY MASS INDEX: 28.16 KG/M2 | SYSTOLIC BLOOD PRESSURE: 132 MMHG | TEMPERATURE: 98.8 F | OXYGEN SATURATION: 96 % | HEIGHT: 62 IN | WEIGHT: 153 LBS | DIASTOLIC BLOOD PRESSURE: 70 MMHG

## 2022-06-08 DIAGNOSIS — R51.9 ACUTE NONINTRACTABLE HEADACHE, UNSPECIFIED HEADACHE TYPE: ICD-10-CM

## 2022-06-08 DIAGNOSIS — G51.0 BELL'S PALSY: ICD-10-CM

## 2022-06-08 PROCEDURE — 99214 OFFICE O/P EST MOD 30 MIN: CPT | Performed by: NURSE PRACTITIONER

## 2022-06-08 RX ORDER — AMITRIPTYLINE HYDROCHLORIDE 10 MG/1
10 TABLET, FILM COATED ORAL NIGHTLY
Qty: 30 TABLET | Refills: 1 | Status: SHIPPED | OUTPATIENT
Start: 2022-06-08 | End: 2022-06-30

## 2022-06-08 ASSESSMENT — FIBROSIS 4 INDEX: FIB4 SCORE: 1.1

## 2022-06-08 NOTE — ASSESSMENT & PLAN NOTE
She has been having left sided head pain for 2 months. She was seen in the ER on 5/21/2022 and admitted for stroke work-up. On 5/21/2022 she presented for dizziness and left side head pain. Negative work-up for stroke. The pain is sharp and the intensity can wax and wane.  She was discharged home on Augmentin for sinusitis, propranolol 10 mg 3 times daily, and magnesium oxide 40 mg twice daily.  She was referred to ENT and neurology. She has completed her antibiotic course. She is taking her magnesium oxide twice daily and propranolol twice daily.  On Friday 5/27/2022 she developed facial droop and she she went to Urgent Care at [place] who sent her to ER on 5/29/2022.  She was diagnosed with Bell's palsy.  She was prescribed prednisone 40 mg daily for 5 days and valacyclovir 1 g twice daily for 10 days. She has completed her Prednisone 5 day course and she continues with her valacyclovir. The left sided pain is affecting her sleep. She has been taking ibuprofen without relief.  She is scheduled with neurology on 7/28/2022. She has scheduled with ENT for June 2022. She does report numbness to the left side of her face.  She is unable to move her left eyebrow and close her left eye completely.

## 2022-06-08 NOTE — PROGRESS NOTES
Subjective:     CC: head pain     HPI:   Elizabeth presents today with the following:    Acute nonintractable headache  She has been having left sided head pain for 2 months. She was seen in the ER on 5/21/2022 and admitted for stroke work-up. On 5/21/2022 she presented for dizziness and left side head pain. Negative work-up for stroke. The pain is sharp and the intensity can wax and wane.  She was discharged home on Augmentin for sinusitis, propranolol 10 mg 3 times daily, and magnesium oxide 40 mg twice daily.  She was referred to ENT and neurology. She has completed her antibiotic course. She is taking her magnesium oxide twice daily and propranolol twice daily.  On Friday 5/27/2022 she developed facial droop and she she went to Urgent Care at [place] who sent her to ER on 5/29/2022.  She was diagnosed with Bell's palsy.  She was prescribed prednisone 40 mg daily for 5 days and valacyclovir 1 g twice daily for 10 days. She has completed her Prednisone 5 day course and she continues with her valacyclovir. The left sided pain is affecting her sleep. She has been taking ibuprofen without relief.  She is scheduled with neurology on 7/28/2022. She has scheduled with ENT for June 2022. She does report numbness to the left side of her face.  She is unable to move her left eyebrow and close her left eye completely.      Past Medical History:   Diagnosis Date   • Allergic rhinitis    • Dalton's esophagus 4/14/2017   • Dental disorder     partial upper   • Diabetes mellitus without complication (HCC) 11/24/2015   • Dyslipidemia    • Essential hypertension 8/25/2015   • GERD (gastroesophageal reflux disease) 5/7/2009   • HTN    • Low back pain    • Migraine headache    • Tear of medial cartilage or meniscus of knee, current 2/13/2014       Social History     Tobacco Use   • Smoking status: Former Smoker     Packs/day: 0.50     Years: 20.00     Pack years: 10.00     Types: Cigarettes     Quit date: 1/1/2006     Years since  "quittin.4   • Smokeless tobacco: Never Used   • Tobacco comment: 5 cig's a day for 20 yrs quit in    Vaping Use   • Vaping Use: Never used   Substance Use Topics   • Alcohol use: Not Currently     Alcohol/week: 0.0 oz   • Drug use: No       Current Outpatient Medications Ordered in Epic   Medication Sig Dispense Refill   • amitriptyline (ELAVIL) 10 MG Tab Take 1 Tablet by mouth every evening. 30 Tablet 1   • valacyclovir (VALTREX) 1 GM Tab Take 1 Tablet by mouth 2 times a day. 20 Tablet 0   • aspirin (ASA) 81 MG Chew Tab chewable tablet Chew 2 Tablets every day. 100 Tablet 3   • magnesium oxide 400 (240 Mg) MG Tab Take 1 Tablet by mouth 2 times a day for 30 days. 60 Tablet 3   • pioglitazone (ACTOS) 45 MG Tab Take 1 Tablet by mouth every day. 90 Tablet 1   • metFORMIN ER (GLUCOPHAGE XR) 500 MG TABLET SR 24 HR Take 2 Tablets by mouth 2 times a day. 360 Tablet 1   • simvastatin (ZOCOR) 40 MG Tab TAKE 1 TABLET EVERY EVENING 90 Tablet 3   • omeprazole (PRILOSEC) 20 MG delayed-release capsule Take 1 Capsule by mouth every day. (Patient taking differently: Take 20 mg by mouth 1 time a day as needed.) 90 Capsule 2   • losartan (COZAAR) 100 MG Tab TAKE 1 TABLET DAILY 90 Tablet 3   • hydroCHLOROthiazide (HYDRODIURIL) 12.5 MG tablet Take 1 Tablet by mouth every day. 90 Tablet 3   • amLODIPine (NORVASC) 10 MG Tab TAKE 1 TABLET DAILY 90 tablet 3   • propranolol (INDERAL) 10 MG Tab Take 1 Tablet by mouth 3 times a day. 90 Tablet 11     No current [facility]-ordered facility-administered medications on file.       Allergies:  Patient has no known allergies.    Health Maintenance: Deferred      Objective:     Vital signs reviewed  Exam:  /70 (BP Location: Left arm, Patient Position: Sitting, BP Cuff Size: Adult)   Pulse 82   Temp 37.1 °C (98.8 °F) (Temporal)   Ht 1.575 m (5' 2\")   Wt 69.4 kg (153 lb)   LMP 2002   SpO2 96%   BMI 27.98 kg/m²  Body mass index is 27.98 kg/m².      General: Normal appearing. No " distress.  HENT: Normocephalic. Ears normal shape and contour, canals are clear bilaterally, tympanic membranes are benign, nasal mucosa benign.  Eyes: Eyes conjunctiva clear lids, pupils equal and reactive to light accommodation, lids normal. Left eye unable to close completely, unable to move left eyebrow.   Pulmonary: Clear to ausculation.  Normal effort. No rales, ronchi, or wheezing.  Cardiovascular: Regular rate and rhythm without murmur. Neurologic:  CN II-XII intact, left facial drooping present, unequal smile, unable to life left eyebrow.  Lymph: No cervical or supraclavicular lymph nodes are palpable  Skin: Warm and dry.  No obvious lesions.  Musculoskeletal: No extremity cyanosis, clubbing, or edema.  Upper extremity strength 5/5 and lower extremity strength 5/5.  Psych: Normal mood and affect. Alert and oriented x3. Judgment and insight is normal.        Assessment & Plan:     67 y.o. female with the following -     1. Bell's palsy  Acute uncomplicated problem. Complete valacyclovir course. She is unable to completely close left eye, will send her to opthalmology to monitor. Encouraged OTC eye drops to keep the eye moist. She is interested in exercises for the facial muscles, will refer to physical therapy. Discussed that this will resolve over several weeks to months. Keep upcoming neurology appointment.   - Referral to Ophthalmology  - Referral to Physical Therapy    2. Acute nonintractable headache, unspecified headache type  Acute complicated problem. Recent neuro work-up unremarkable. She has upcoming neurology appointment. Discussed can start with low dose amitriptyline at night time to see if this helps. She states that she is comfortable with MyChart and we dicussed that I would like for her to send me a message in 1 week to see how the medication is working, if no relief plan to increase dose. She had upcoming 7/5/2022 appointment with me.   - amitriptyline (ELAVIL) 10 MG Tab; Take 1 Tablet by  mouth every evening.  Dispense: 30 Tablet; Refill: 1      Return keep upcoming 7/5/2022 appointment.    Please note that this dictation was created using voice recognition software. I have made every reasonable attempt to correct obvious errors, but I expect that there are errors of grammar and possibly content that I did not discover before finalizing the note.

## 2022-06-08 NOTE — ASSESSMENT & PLAN NOTE
She has been having left sided head pain for 2 months. She was seen in the ER on 5/21 and admitted for stroke work-up.

## 2022-06-16 ENCOUNTER — PHYSICAL THERAPY (OUTPATIENT)
Dept: PHYSICAL THERAPY | Facility: REHABILITATION | Age: 67
End: 2022-06-16
Attending: NURSE PRACTITIONER
Payer: MEDICARE

## 2022-06-16 DIAGNOSIS — G51.0 BELL'S PALSY: ICD-10-CM

## 2022-06-16 PROCEDURE — 97110 THERAPEUTIC EXERCISES: CPT

## 2022-06-16 PROCEDURE — 97161 PT EVAL LOW COMPLEX 20 MIN: CPT

## 2022-06-16 SDOH — ECONOMIC STABILITY: GENERAL: QUALITY OF LIFE: FAIR

## 2022-06-16 ASSESSMENT — ENCOUNTER SYMPTOMS
PAIN SCALE AT LOWEST: 0
PAIN SCALE: 1
PAIN SCALE AT HIGHEST: 4

## 2022-06-16 NOTE — OP THERAPY EVALUATION
Outpatient Physical Therapy  INITIAL EVALUATION    [facility] [place]  [address]  [place] [address]  Phone:  [phone]  Fax:  [phone]    Date of Evaluation: 2022    Patient: Elizabeth Blunt  YOB: 1955  MRN: [id number]     Referring Provider: WILLIAM Velarde  [address] [place] [address]   Referring Diagnosis Bell's palsy [G51.0]     Time Calculation  Start time: 1500  Stop time: 1545 Time Calculation (min): 45 minutes         Chief Complaint: No chief complaint on file.    Visit Diagnoses     ICD-10-CM   1. Bell's palsy  G51.0       Date of onset of impairment: 2022    Subjective:   History of Present Illness:     Date of onset:  2022  Quality of life:  Fair  Prior level of function:  New onset facial dysfunciton  Pain:     Current pain ratin    At best pain ratin    At worst pain ratin  Activities of Daily Living:     Patient reported ADL status: Decreased use of facial muscles  Decrease in effective communication  Patient Goals:     Patient goals for therapy:  Increased motion and decreased pain    Patient is a 67 y.o. female that presents to therapy with bells palsy. States that symptoms were insidious in onset. Reports the pain is located in the L portion of her head posterior. Notes the quality to be sharp/dull, intermittent. Reports that symptoms now getting better / not changing. States that her symptoms are primally facial paralysis.    Past Medical History:   Diagnosis Date   • Allergic rhinitis    • Dalton's esophagus 2017   • Dental disorder     partial upper   • Diabetes mellitus without complication (HCC) 2015   • Dyslipidemia    • Essential hypertension 2015   • GERD (gastroesophageal reflux disease) 2009   • HTN    • Low back pain    • Migraine headache    • Tear of medial cartilage or meniscus of knee, current 2014     Past Surgical History:   Procedure  Laterality Date   • ROTATOR CUFF REPAIR Left 2017   • OREN BY LAPAROSCOPY Bilateral 8/3/2016    Procedure: OREN BY LAPAROSCOPY;  Surgeon: Scott Canales M.D.;  Location: SURGERY SAME DAY [facility];  Service:    • KNEE ARTHROSCOPY  2014    Performed by Wiley Kenyon M.D. at SURGERY [facility]   • MENISCECTOMY, KNEE, MEDIAL  2014    Performed by Wiley Kenyon M.D. at SURGERY [facility]   • SEPTOTURBINOPLASTY  3/30/2010    Performed by TRAV MENJIVAR at SURGERY SAME DAY [facility]   • SIGMOIDOSCOPY FLEX  3/10    normal   • COLONOSCOPY  3/09    normal   • ORIF, ANKLE     • APPENDECTOMY     • ABDOMINAL HYSTERECTOMY TOTAL      partial due to fibroids     Social History     Tobacco Use   • Smoking status: Former Smoker     Packs/day: 0.50     Years: 20.00     Pack years: 10.00     Types: Cigarettes     Quit date: 2006     Years since quittin.4   • Smokeless tobacco: Never Used   • Tobacco comment: 5 cig's a day for 20 yrs quit in    Substance Use Topics   • Alcohol use: Not Currently     Alcohol/week: 0.0 oz     Family and Occupational History     Socioeconomic History   • Marital status:      Spouse name: Not on file   • Number of children: Not on file   • Years of education: Not on file   • Highest education level: Not on file   Occupational History   • Occupation: Retired     Employer: [employer]       Objective     Postural Observations  Seated posture: poor  Standing posture: poor        Shoulder Screen    Shoulder active range of motion within functional limits.    Neurological Testing     Reflexes   Left   Biceps (C5/C6): normal (2+)  Triceps (C7): normal (2+)  Ankle clonus reflex: negative  Babinski sign: negative  Torres's reflex: negative    Right   Biceps (C5/C6): normal (2+)  Triceps (C7): normal (2+)  Ankle clonus reflex: negative  Babinski sign: negative  Torres's reflex: negative    Myotome testing   Cervical (left)   C5 (deltoid): 5  C6  (biceps): 5  C7 (triceps): 5  C8 (thumb extension): 5  T1 (intrinsics): 5    Cervical (right)   C5 (deltoid): 5  C6 (biceps): 5  C7 (triceps): 5  C8 (thumb extension): 5  T1 (intrinsics): 5    Dermatome testing   Cervical (left)   All left cervical dermatomes intact    Cervical (right)   All right cervical dermatomes intact    Palpation   Left   Hypertonic in the levator scapulae and upper trapezius.   Tenderness of the levator scapulae and upper trapezius.     Right   Hypertonic in the levator scapulae and upper trapezius.   Tenderness of the levator scapulae and upper trapezius.     Active Range of Motion     Cervical Spine   Flexion: Active cervical flexion: 63deg.  Extension: Active cervical extension: 35deg.  Left lateral flexion: Active left cervical lateral flexion: 34deg.  Right lateral flexion: Active right cervical lateral flexion: 33deg.  Left rotation: Active left cervical rotation: 43deg.  Right rotation: Active right cervical rotation: 55deg.    General Comments     Spine Comments   Face function: Eye brow elevation R Able L unable  Frown: R able L unable  Eye closure: R able L partial (able to fully close)  Nose depression: R able L unable  Cheek elevation: R able L unable  Smile: R able L unable  deviation to R  Lip depression R able L unable deviation to R  Pursed lip: R able L unable Deviation to R  Sucking: Unable B        Therapeutic Exercises (CPT 77609):       Therapeutic Exercise Summary: Access Code: [email or web address]  URL: [email or web address]  Date: 06/16/2022  Prepared by: Dante Vogel    Exercises  Alternating Pucker-Smile with Mouth Closed - 1 x daily - 7 x weekly - 1 sets - 10 reps  Alternating Pucker-Smile with Mouth Open - 1 x daily - 7 x weekly - 1 sets - 10 reps  Smile - 1 x daily - 7 x weekly - 1 sets - 10 reps  Smile with Facial Stimulation - 1 x daily - 7 x weekly - 1 sets - 10 reps  Compress Lips - 1 x daily - 7 x weekly - 1 sets - 10 reps  Flare Nostril and Wrinkle Nose - 1  x daily - 7 x weekly - 1 sets - 10 reps  Puffing Out Cheeks - 1 x daily - 7 x weekly - 1 sets - 10 reps  Sucking In Cheeks - 1 x daily - 7 x weekly - 1 sets - 10 reps  Mouth Pouting - 1 x daily - 7 x weekly - 1 sets - 10 reps  Neck Wrinkle - 1 x daily - 7 x weekly - 1 sets - 10 reps  Blowing a Kiss - 1 x daily - 7 x weekly - 1 sets - 10 reps    Edu with hand assist        Time-based treatments/modalities:    Physical Therapy Timed Treatment Charges  Therapeutic exercise minutes (CPT 05053): 10 minutes      Assessment, Response and Plan:   Impairments: abnormal muscle firing, abnormal or restricted ROM and activity intolerance    Assessment details:  Patient presents with signs and symptoms consistent with bells palsy. Patient limitations include weakness, decreased ROM, and pain. Patient will benefit from skilled therapy to improve the aforementioned deficits and decrease further functional decline.   Prognosis: fair    Goals:   Short Term Goals:   1) Patient will demonstrate basic understanding of exercise for HEP.   2) Patient's symptoms will improve to facilitate full eye closure 100% of the time without cues.   Short term goal time span:  2-4 weeks      Long Term Goals:    1) Patient will demonstrate proficiency with exercise and be able to manage HEP with modifications.     Long term goal time span:  6-8 weeks    Plan:   Therapy options:  Physical therapy treatment to continue  Planned therapy interventions:  E Stim Unattended (CPT 86018), Manual Therapy (CPT 03272), Neuromuscular Re-education (CPT 19845), Therapeutic Exercise (CPT 79685) and Hot or Cold Pack Therapy (CPT 16269)  Frequency:  2x week  Duration in weeks:  8  Discussed with:  Patient      Functional Assessment Used    NA    Referring provider co-signature:  I have reviewed this plan of care and my co-signature certifies the need for services.    Certification Period: 06/16/2022 to  08/11/22    Physician Signature: ________________________________  Date: ______________

## 2022-06-24 ENCOUNTER — PHYSICAL THERAPY (OUTPATIENT)
Dept: PHYSICAL THERAPY | Facility: REHABILITATION | Age: 67
End: 2022-06-24
Attending: NURSE PRACTITIONER
Payer: MEDICARE

## 2022-06-24 DIAGNOSIS — G51.0 BELL'S PALSY: ICD-10-CM

## 2022-06-24 PROCEDURE — 97110 THERAPEUTIC EXERCISES: CPT

## 2022-06-24 NOTE — OP THERAPY DAILY TREATMENT
Outpatient Physical Therapy  DAILY TREATMENT     [facility] [place]  [address], [address]  [facility] [address]  Phone:  [phone]  Fax:  [phone]    Date: 06/24/2022    Patient: Elizabeth Blunt  YOB: 1955  MRN: [id number]     Time Calculation    Start time: 1100  Stop time: 1130 Time Calculation (min): 30 minutes         Chief Complaint: No chief complaint on file.    Visit #: 2    SUBJECTIVE:  Patient reports that she may have a little more L eyebrow movement.     OBJECTIVE:  Current objective measures:           Therapeutic Exercises (CPT 21745):     1. Therapist assist  on all below, x30min      Therapeutic Exercise Summary: Access Code: [email or web address]  URL: [email or web address]  Date: 06/24/2022  Prepared by: Dante Vogel    Exercises  Alternating Pucker-Smile with Mouth Closed - 1 x daily - 7 x weekly - 1 sets - 10 reps  Alternating Pucker-Smile with Mouth Open - 1 x daily - 7 x weekly - 1 sets - 10 reps  Smile - 1 x daily - 7 x weekly - 1 sets - 10 reps  Smile with Facial Stimulation - 1 x daily - 7 x weekly - 1 sets - 10 reps  Compress Lips - 1 x daily - 7 x weekly - 1 sets - 10 reps  Flare Nostril and Wrinkle Nose - 1 x daily - 7 x weekly - 1 sets - 10 reps  Puffing Out Cheeks - 1 x daily - 7 x weekly - 1 sets - 10 reps  Sucking In Cheeks - 1 x daily - 7 x weekly - 1 sets - 10 reps  Mouth Pouting - 1 x daily - 7 x weekly - 1 sets - 10 reps  Neck Wrinkle - 1 x daily - 7 x weekly - 1 sets - 10 reps  Blowing a Kiss - 1 x daily - 7 x weekly - 1 sets - 10 reps        Time-based treatments/modalities:    Physical Therapy Timed Treatment Charges  Therapeutic exercise minutes (CPT 22772): 30 minutes      Pain rating (1-10) before treatment:  0  Pain rating (1-10) after treatment:  0    ASSESSMENT:   Response to treatment: Patient responded well to therapy with an oveall increased understanding of exercise. Plan to continue with current POC.      PLAN/RECOMMENDATIONS:   Plan for treatment: therapy treatment to continue next visit.  Planned interventions for next visit: continue with current treatment.

## 2022-06-29 ENCOUNTER — HOSPITAL ENCOUNTER (OUTPATIENT)
Dept: RADIOLOGY | Facility: MEDICAL CENTER | Age: 67
End: 2022-06-29
Attending: STUDENT IN AN ORGANIZED HEALTH CARE EDUCATION/TRAINING PROGRAM
Payer: MEDICARE

## 2022-06-29 DIAGNOSIS — G51.0 BELL'S PALSY: ICD-10-CM

## 2022-06-29 PROCEDURE — 700117 HCHG RX CONTRAST REV CODE 255: Performed by: STUDENT IN AN ORGANIZED HEALTH CARE EDUCATION/TRAINING PROGRAM

## 2022-06-29 PROCEDURE — A9576 INJ PROHANCE MULTIPACK: HCPCS | Performed by: STUDENT IN AN ORGANIZED HEALTH CARE EDUCATION/TRAINING PROGRAM

## 2022-06-29 PROCEDURE — 70553 MRI BRAIN STEM W/O & W/DYE: CPT | Mod: MH

## 2022-06-29 RX ADMIN — GADOTERIDOL 15 ML: 279.3 INJECTION, SOLUTION INTRAVENOUS at 09:16

## 2022-06-30 DIAGNOSIS — R51.9 ACUTE NONINTRACTABLE HEADACHE, UNSPECIFIED HEADACHE TYPE: ICD-10-CM

## 2022-06-30 RX ORDER — AMITRIPTYLINE HYDROCHLORIDE 10 MG/1
TABLET, FILM COATED ORAL
Qty: 30 TABLET | Refills: 1 | Status: SHIPPED | OUTPATIENT
Start: 2022-06-30 | End: 2022-07-05

## 2022-06-30 NOTE — TELEPHONE ENCOUNTER
Requested Prescriptions     Signed Prescriptions Disp Refills   • amitriptyline (ELAVIL) 10 MG Tab 30 Tablet 1     Sig: TAKE 1 TABLET BY MOUTH EVERY DAY IN THE EVENING     Authorizing Provider: JESSIE HOOD A.P.R.N.

## 2022-07-01 ENCOUNTER — PHYSICAL THERAPY (OUTPATIENT)
Dept: PHYSICAL THERAPY | Facility: REHABILITATION | Age: 67
End: 2022-07-01
Attending: NURSE PRACTITIONER
Payer: MEDICARE

## 2022-07-01 DIAGNOSIS — G51.0 BELL'S PALSY: ICD-10-CM

## 2022-07-01 PROCEDURE — 97110 THERAPEUTIC EXERCISES: CPT

## 2022-07-01 NOTE — OP THERAPY DAILY TREATMENT
Outpatient Physical Therapy  DAILY TREATMENT     [facility] [place]  [address]  [facility] [facility] [address]  Phone:  [phone]  Fax:  [phone]    Date: 07/01/2022    Patient: Elizabeth Blunt  YOB: 1955  MRN: [id number]     Time Calculation    Start time: 1500  Stop time: 1538 Time Calculation (min): 38 minutes         Chief Complaint: No chief complaint on file.    Visit #: 3    SUBJECTIVE:  Patient reports that she is doing her HEP.     OBJECTIVE:  Current objective measures: Noted a minor increase in L cheek movement          Therapeutic Exercises (CPT 45554):     1. Therapist assist  on all below, x30min      Therapeutic Exercise Summary: Access Code: [email or web address]  URL: [email or web address]  Date: 06/24/2022  Prepared by: Dante Vogel    Exercises  Alternating Pucker-Smile with Mouth Closed - 1 x daily - 7 x weekly - 1 sets - 10 reps  Alternating Pucker-Smile with Mouth Open - 1 x daily - 7 x weekly - 1 sets - 10 reps  Smile - 1 x daily - 7 x weekly - 1 sets - 10 reps  Smile with Facial Stimulation - 1 x daily - 7 x weekly - 1 sets - 10 reps  Compress Lips - 1 x daily - 7 x weekly - 1 sets - 10 reps  Flare Nostril and Wrinkle Nose - 1 x daily - 7 x weekly - 1 sets - 10 reps  Puffing Out Cheeks - 1 x daily - 7 x weekly - 1 sets - 10 reps  Sucking In Cheeks - 1 x daily - 7 x weekly - 1 sets - 10 reps  Mouth Pouting - 1 x daily - 7 x weekly - 1 sets - 10 reps  Neck Wrinkle - 1 x daily - 7 x weekly - 1 sets - 10 reps  Blowing a Kiss - 1 x daily - 7 x weekly - 1 sets - 10 reps        Time-based treatments/modalities:    Physical Therapy Timed Treatment Charges  Therapeutic exercise minutes (CPT 58674): 38 minutes      Pain rating (1-10) before treatment:  0  Pain rating (1-10) after treatment:  0    ASSESSMENT:   Response to treatment: Patient is compliant with HEP. Plan to continue with current POC.     PLAN/RECOMMENDATIONS:   Plan for treatment: therapy  treatment to continue next visit.  Planned interventions for next visit: continue with current treatment.

## 2022-07-05 ENCOUNTER — OFFICE VISIT (OUTPATIENT)
Dept: MEDICAL GROUP | Facility: PHYSICIAN GROUP | Age: 67
End: 2022-07-05
Payer: MEDICARE

## 2022-07-05 VITALS
DIASTOLIC BLOOD PRESSURE: 70 MMHG | HEART RATE: 69 BPM | HEIGHT: 62 IN | OXYGEN SATURATION: 95 % | TEMPERATURE: 97.5 F | SYSTOLIC BLOOD PRESSURE: 118 MMHG | WEIGHT: 158 LBS | BODY MASS INDEX: 29.08 KG/M2

## 2022-07-05 DIAGNOSIS — E11.65 TYPE 2 DIABETES MELLITUS WITH HYPERGLYCEMIA, WITHOUT LONG-TERM CURRENT USE OF INSULIN (HCC): ICD-10-CM

## 2022-07-05 DIAGNOSIS — G51.0 BELL'S PALSY: ICD-10-CM

## 2022-07-05 LAB
HBA1C MFR BLD: 9.8 % (ref 0–5.6)
INT CON NEG: ABNORMAL
INT CON POS: ABNORMAL

## 2022-07-05 PROCEDURE — 83036 HEMOGLOBIN GLYCOSYLATED A1C: CPT | Mod: GZ | Performed by: NURSE PRACTITIONER

## 2022-07-05 PROCEDURE — 99214 OFFICE O/P EST MOD 30 MIN: CPT | Performed by: NURSE PRACTITIONER

## 2022-07-05 ASSESSMENT — FIBROSIS 4 INDEX: FIB4 SCORE: 1.1

## 2022-07-05 NOTE — ASSESSMENT & PLAN NOTE
She has followed up with ENT. She has been able to stop the amitriptyline 1 week ago. No recurrent headache. Notes improvement except at night time her left eye does not close completely. She is using eye drops to moisturize. She has followed up with ENT, Dr Tara Parks who recommended eye patch at bedtime. Intermittent blurry vision. Outdoors can exacerbate, improved with eye patch. She has not scheduled with ophthalmology.

## 2022-07-05 NOTE — PROGRESS NOTES
Subjective:     CC: Diabetes    HPI:   Elizabeth presents today with the following:    Type 2 diabetes mellitus with hyperglycemia, without long-term current use of insulin (HCC)  She continues with Metformin 1,000 mg twice daily and pioglitazone 45 mg daily. She is checking her blood sugars intermittently at home. Her fasting blood sugar yesterday was 182. Reports this is her highest blood sugar. She is scheduled to have left knee arthroscopy on 2022 with Dr. Kenyon. Dr. Kenyon requesting to have A1C less than 9.0%. Due for A1C today.     Bell's palsy  She has followed up with ENT. She has been able to stop the amitriptyline 1 week ago. No recurrent headache. Notes improvement except at night time her left eye does not close completely. She is using eye drops to moisturize. She has followed up with ENT, Dr Tara Parks who recommended eye patch at bedtime. Intermittent blurry vision. Outdoors can exacerbate, improved with eye patch. She has not scheduled with ophthalmology.      Past Medical History:   Diagnosis Date   • Allergic rhinitis    • Dalton's esophagus 2017   • Dental disorder     partial upper   • Diabetes mellitus without complication (HCC) 2015   • Dyslipidemia    • Essential hypertension 2015   • GERD (gastroesophageal reflux disease) 2009   • HTN    • Low back pain    • Migraine headache    • Tear of medial cartilage or meniscus of knee, current 2014       Social History     Tobacco Use   • Smoking status: Former Smoker     Packs/day: 0.50     Years: 20.00     Pack years: 10.00     Types: Cigarettes     Quit date: 2006     Years since quittin.5   • Smokeless tobacco: Never Used   • Tobacco comment: 5 cig's a day for 20 yrs quit in    Vaping Use   • Vaping Use: Never used   Substance Use Topics   • Alcohol use: Not Currently     Alcohol/week: 0.0 oz   • Drug use: No       Current Outpatient Medications Ordered in Epic   Medication Sig Dispense Refill   • valacyclovir  "(VALTREX) 1 GM Tab Take 1 Tablet by mouth 2 times a day. 20 Tablet 0   • aspirin (ASA) 81 MG Chew Tab chewable tablet Chew 2 Tablets every day. 100 Tablet 3   • pioglitazone (ACTOS) 45 MG Tab Take 1 Tablet by mouth every day. 90 Tablet 1   • metFORMIN ER (GLUCOPHAGE XR) 500 MG TABLET SR 24 HR Take 2 Tablets by mouth 2 times a day. 360 Tablet 1   • simvastatin (ZOCOR) 40 MG Tab TAKE 1 TABLET EVERY EVENING 90 Tablet 3   • omeprazole (PRILOSEC) 20 MG delayed-release capsule Take 1 Capsule by mouth every day. (Patient taking differently: Take 20 mg by mouth 1 time a day as needed.) 90 Capsule 2   • losartan (COZAAR) 100 MG Tab TAKE 1 TABLET DAILY 90 Tablet 3   • hydroCHLOROthiazide (HYDRODIURIL) 12.5 MG tablet Take 1 Tablet by mouth every day. 90 Tablet 3   • amLODIPine (NORVASC) 10 MG Tab TAKE 1 TABLET DAILY 90 tablet 3   • propranolol (INDERAL) 10 MG Tab Take 1 Tablet by mouth 3 times a day. (Patient not taking: Reported on 7/5/2022) 90 Tablet 11     No current [facility]-ordered facility-administered medications on file.       Allergies:  Patient has no known allergies.    Health Maintenance: Reviewed       Objective:     Vital signs reviewed  Exam:  /70 (BP Location: Left arm, Patient Position: Sitting, BP Cuff Size: Adult)   Pulse 69   Temp 36.4 °C (97.5 °F) (Temporal)   Ht 1.575 m (5' 2\")   Wt 71.7 kg (158 lb)   LMP 12/05/2002   SpO2 95%   BMI 28.90 kg/m²  Body mass index is 28.9 kg/m².    Gen: Alert and oriented, No apparent distress.  Left eye able to close completely on command, left facial drooping remains but has improved from previous.  Lungs: Normal effort, CTA bilaterally, no wheezes, rhonchi, or rales  CV: Regular rate and rhythm. No murmurs, rubs, or gallops.  Ext: No clubbing, cyanosis, edema.      Assessment & Plan:     67 y.o. female with the following -     1. Type 2 diabetes mellitus with hyperglycemia, without long-term current use of insulin (HCC)  Chronic stable problem.  A1c is stable " today at 9.8%.  We will fax over her medical clearance form to [facility] for upcoming left knee arthroscopy.  Copy of the clearance provided to patient today.  She will continue with her metformin 1000 mg twice daily and pioglitazone 45 mg daily.  I would like to see her back in 3 months for follow-up on her A1c as her recent A1c was only 2 months since the medication change.  Continue watching her diet and exercise as tolerated.  - POCT Hemoglobin A1C    2. Bell's palsy  Chronic stable problem.  Continue upcoming physical therapy and neurology appointments.  Headache has resolved, no longer needing amitriptyline.  Continue facial exercises.        Return in about 3 months (around 10/5/2022) for Diabetes, A1C.    Please note that this dictation was created using voice recognition software. I have made every reasonable attempt to correct obvious errors, but I expect that there are errors of grammar and possibly content that I did not discover before finalizing the note.

## 2022-07-05 NOTE — ASSESSMENT & PLAN NOTE
She continues with Metformin 1,000 mg twice daily and pioglitazone 45 mg daily. She is checking her blood sugars intermittently at home. Her fasting blood sugar yesterday was 182. Reports this is her highest blood sugar. She is scheduled to have left knee arthroscopy on 7/19/2022 with Dr. Kenyon. Dr. Kenyon requesting to have A1C less than 9.0%. Due for A1C today.

## 2022-07-15 ENCOUNTER — PHYSICAL THERAPY (OUTPATIENT)
Dept: PHYSICAL THERAPY | Facility: REHABILITATION | Age: 67
End: 2022-07-15
Attending: NURSE PRACTITIONER
Payer: MEDICARE

## 2022-07-15 DIAGNOSIS — G51.0 BELL'S PALSY: ICD-10-CM

## 2022-07-15 PROCEDURE — 97110 THERAPEUTIC EXERCISES: CPT

## 2022-07-15 NOTE — OP THERAPY DAILY TREATMENT
Outpatient Physical Therapy  DAILY TREATMENT     [facility] [place]  [address]  [place] [facility] [address]  Phone:  [phone]  Fax:  [phone]    Date: 07/15/2022    Patient: Elizabeth Blunt  YOB: 1955  MRN: [id number]     Time Calculation    Start time: 1030  Stop time: 1100 Time Calculation (min): 30 minutes         Chief Complaint: No chief complaint on file.    Visit #: 4    SUBJECTIVE:  Patient reports that she feels her face is moving more.     OBJECTIVE:  Current objective measures:           Therapeutic Exercises (CPT 53382):     1. Therapist assist  on all below, x30min      Therapeutic Exercise Summary: Access Code: [email or web address]  URL: [email or web address]  Date: 06/24/2022  Prepared by: Dante Bernalova    Exercises  Alternating Pucker-Smile with Mouth Closed - 1 x daily - 7 x weekly - 1 sets - 10 reps  Alternating Pucker-Smile with Mouth Open - 1 x daily - 7 x weekly - 1 sets - 10 reps  Smile - 1 x daily - 7 x weekly - 1 sets - 10 reps  Smile with Facial Stimulation - 1 x daily - 7 x weekly - 1 sets - 10 reps  Compress Lips - 1 x daily - 7 x weekly - 1 sets - 10 reps  Flare Nostril and Wrinkle Nose - 1 x daily - 7 x weekly - 1 sets - 10 reps  Puffing Out Cheeks - 1 x daily - 7 x weekly - 1 sets - 10 reps  Sucking In Cheeks - 1 x daily - 7 x weekly - 1 sets - 10 reps  Mouth Pouting - 1 x daily - 7 x weekly - 1 sets - 10 reps  Neck Wrinkle - 1 x daily - 7 x weekly - 1 sets - 10 reps  Blowing a Kiss - 1 x daily - 7 x weekly - 1 sets - 10 reps        Time-based treatments/modalities:    Physical Therapy Timed Treatment Charges  Therapeutic exercise minutes (CPT 99471): 30 minutes      Pain rating (1-10) before treatment:  0  Pain rating (1-10) after treatment:  0    ASSESSMENT:   Response to treatment: Patient responded well to therapy she is regaining facial muscle control. Patient to now continue with full HEP only follow up if needed.      PLAN/RECOMMENDATIONS:   Plan for treatment: Hold PT.  Planned interventions for next visit: Hold PT.

## 2022-07-20 ENCOUNTER — APPOINTMENT (OUTPATIENT)
Dept: PHYSICAL THERAPY | Facility: REHABILITATION | Age: 67
End: 2022-07-20
Attending: NURSE PRACTITIONER
Payer: MEDICARE

## 2022-07-20 DIAGNOSIS — I10 ESSENTIAL HYPERTENSION: ICD-10-CM

## 2022-07-21 RX ORDER — AMLODIPINE BESYLATE 10 MG/1
TABLET ORAL
Qty: 90 TABLET | Refills: 0 | Status: SHIPPED | OUTPATIENT
Start: 2022-07-21 | End: 2023-01-16

## 2022-07-21 RX ORDER — HYDROCHLOROTHIAZIDE 12.5 MG/1
TABLET ORAL
Qty: 90 TABLET | Refills: 0 | Status: SHIPPED | OUTPATIENT
Start: 2022-07-21 | End: 2023-01-16

## 2022-07-21 NOTE — TELEPHONE ENCOUNTER
Requested Prescriptions     Pending Prescriptions Disp Refills   • hydroCHLOROthiazide (HYDRODIURIL) 12.5 MG tablet [Pharmacy Med Name: HYDROCHLOROTHIAZIDE TABS 12.5MG] 90 Tablet 0     Sig: TAKE 1 TABLET DAILY   • amLODIPine (NORVASC) 10 MG Tab [Pharmacy Med Name: AMLODIPINE BESYLATE TABS 10MG] 90 Tablet 0     Sig: TAKE 1 TABLET DAILY       NOVA Brandon.

## 2022-07-27 DIAGNOSIS — E11.65 TYPE 2 DIABETES MELLITUS WITH HYPERGLYCEMIA, WITHOUT LONG-TERM CURRENT USE OF INSULIN (HCC): ICD-10-CM

## 2022-07-28 ENCOUNTER — APPOINTMENT (OUTPATIENT)
Dept: NEUROLOGY | Facility: MEDICAL CENTER | Age: 67
End: 2022-07-28
Attending: NURSE PRACTITIONER
Payer: COMMERCIAL

## 2022-07-28 RX ORDER — METFORMIN HYDROCHLORIDE 500 MG/1
TABLET, EXTENDED RELEASE ORAL
Qty: 360 TABLET | Refills: 3 | Status: SHIPPED | OUTPATIENT
Start: 2022-07-28

## 2022-07-28 NOTE — TELEPHONE ENCOUNTER
Requested Prescriptions     Signed Prescriptions Disp Refills   • metFORMIN ER (GLUCOPHAGE XR) 500 MG TABLET SR 24  Tablet 3     Sig: TAKE 2 TABLETS TWICE A DAY     Authorizing Provider: JESSIE HOOD A.P.R.N.

## 2022-08-05 ENCOUNTER — APPOINTMENT (OUTPATIENT)
Dept: PHYSICAL THERAPY | Facility: REHABILITATION | Age: 67
End: 2022-08-05
Attending: NURSE PRACTITIONER
Payer: COMMERCIAL

## 2022-08-08 NOTE — ASSESSMENT & PLAN NOTE
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 Shena Mcneill ([facility])  Has the plan of care been signed (Y/N):        []  Yes  [x]  No     Date of Patient follow up with Physician: in a couple of weeks      Is this a Progress Report:     []  Yes  [x]  No        If Yes:  Date Range for reporting period:  Beginning 22  Ending    Progress report will be due (10 Rx or 30 days whichever is less): 97       Recertification will be due (POC Duration  / 90 days whichever is less):  22     Visit # Insurance Allowable Auth Required   In-person 5 25 visits (2 or 13 used) []  Yes [x]  No    Telehealth - - []  Yes []  No    Total          Functional Scale:FOTO Ankle Score: 30/100 = 30% ability, 70% disability; Dance Functional Outcome Survey:  = 9% ability, 91% disability   Date assessed:  22          Number of Comorbidities:  [x]0     []1-2    []3+    Latex Allergy:  [x]NO      []YES  Preferred Language for Healthcare:   [x]English       []other:      Pain level:  0/10     SUBJECTIVE:  Pt walked around in mall for couple of hours about a week and was pretty sore for about 3 days, but it was sore in her ankle muscles not at the site of injury and she did not motivce any increaed swelling.  Then yesterday she did a decent amount of walking and it didn't get as sore which     OBJECTIVE:   Observation: talar posterior glide 80% imrpoved  Test measurements:  AROM DF knee extended 10 deg, AROM DF bent ankle 11 deg    RESTRICTIONS/PRECAUTIONS:     Exercises/Interventions:     Therapeutic Ex (21381) Sets/ Reps/ sec Notes/CUES   Toe curls     toes ext     foot doming + DF    1st ray PF, 1st ray PF + inv, 1st ray PF + seated releve    Seated heel slides Focus on hindfoot and midfoot mobility   Seated DF    BAPS: DF/PF, inv/ev, CW/CCW NWB, L3 1x20ea    Toes marble pick-up    Seated releve on incline    Ankle RROM diagonals 2x10ea    Toes RROM flex & Ext 2x10ea    Ref: DL leg press  SL leg press    Ref: heel raises, DL  Heel raises, SL Ball between ankles   Squats 2x10 Cues for equal WB   Fwd & lateral lunges, alternating 2x10ea L    Standing DF  2x10 DL    Heel raises 2x10 DL    Step up with high march 6\" 2x10 L    Heel taps 6\" 2x10 L    Hip hike w/ abd band GVL 2x10 L stance        S/L eversion & Inversion    S/L ankle alphabet    Clamshell feet up    S/L hip abd w/ ext    Supine leg circles    TA table top toe taps    Manual Intervention (41777)    L Talar distraction, talar posterior glide, distal fib posterior glide    MWM ankle DF    Scar massage                   NMR re-education (01092)  CUES NEEDED   Biodex: weightbearing w/ squats, motor control, maze games 10 min Level 3-6   Tandem walking fwd/bwd 2x25ft    Steamboats: L stance Red 2x10ea                                  Therapeutic Activity (78782)                              CP 15 min          Therapeutic Exercise and NMR EXR  [x] (69356) Provided verbal/tactile cueing for activities related to strengthening, flexibility, endurance, ROM for improvements in LE, proximal hip, and core control with self care, mobility, lifting, ambulation.  [] (19962) Provided verbal/tactile cueing for activities related to improving balance, coordination, kinesthetic sense, posture, motor skill, proprioception  to assist with LE, proximal hip, and core control in self care, mobility, lifting, ambulation and eccentric single leg control. NMR and Therapeutic Activities:    [x] (51444 or 82662) Provided verbal/tactile cueing for activities related to improving balance, coordination, kinesthetic sense, posture, motor skill, proprioception and motor activation to allow for proper function of core, proximal hip and LE with self care and ADLs  [] (30873) Gait Re-education- Provided training and instruction to the patient for proper LE, core and proximal hip recruitment and positioning and eccentric body weight control with ambulation re-education including up and down stairs     Home Exercise Program:    [x] (83133) Reviewed/Progressed HEP activities related to strengthening, flexibility, endurance, ROM of core, proximal hip and LE for functional self-care, mobility, lifting and ambulation/stair navigation     Access Code: [email or web address]  URL: [email or web address]  Date: 08/08/2022  Prepared by: Asha Jacinto    Exercises  Squat - 1 x daily - 7 x weekly - 3 sets - 10 reps  Lateral Lunge - 1 x daily - 7 x weekly - 3 sets - 10 reps  Heel Raise - 1 x daily - 7 x weekly - 3 sets - 10 reps  Heel Toe Raises with Counter Support - 1 x daily - 7 x weekly - 3 sets - 10 reps  Runner's Step Up/Down - 1 x daily - 7 x weekly - 3 sets - 10 reps  Lateral Step Down - 1 x daily - 7 x weekly - 3 sets - 10 reps  Hip Hiking on Step - 1 x daily - 7 x weekly - 3 sets - 10 reps  Hip Abduction with Resistance Loop - 1 x daily - 7 x weekly - 3 sets - 10 reps  Backward Tandem Walking - 1 x daily - 7 x weekly - 3 sets - 10 reps  Standing Repeated Hip Extension with Resistance - 1 x daily - 7 x weekly - 3 sets - 10 reps  Standing Repeated Hip Abduction with Resistance - 1 x daily - 7 x weekly - 3 sets - 10 reps  Standing Repeated Hip Flexion with Resistance - 1 x daily - 7 x weekly - 3 sets - 10 reps  Standing Repeated Hip Adduction with Resistance - 1 x daily - 7 x weekly - 3 sets - 10 reps      [] (76971)Reviewed/Progressed HEP activities related to improving balance, coordination, kinesthetic sense, posture, motor skill, proprioception of core, proximal hip and LE for self care, mobility, lifting, and ambulation/stair navigation      Manual Treatments:  PROM / STM / Oscillations-Mobs:  G-I, II, III, IV (PA's, Inf., Post.)  [] (21167) Provided manual therapy to mobilize LE, proximal hip and/or LS spine soft tissue/joints for the purpose of modulating pain, promoting relaxation,  increasing ROM, reducing/eliminating soft tissue swelling/inflammation/restriction, improving soft tissue extensibility and allowing for proper ROM for normal function with self care, mobility, lifting and ambulation. Modalities:     [] GAME READY (VASO)- for significant edema, swelling, pain control. CP x15 min to address inflammation and pain. Charges  Timed Code Treatment Minutes: 45 min   Total Treatment Minutes: 60 min      [] EVAL (LOW) 87147   [] EVAL (MOD) 41501  [] EVAL (HIGH) 76930   [] RE-EVAL     [x] HS(51226) x  2  [] IONTO  [x] NMR (82808) x  1   [] VASO  [] Manual (92914) x      [] Other:  [] TA x      [] Mech Traction (99510)  [] ES(attended) (48649)      [] ES (un) (77711):       GOALS:  Patient stated goal: get back to dance, strengthen  [] Progressing: [] Met: [] Not Met: [] Adjusted    Therapist goals for Patient:   Short Term Goals: To be achieved in: 2 weeks  1. Independent in HEP and progression per patient tolerance, in order to prevent re-injury. [] Progressing: [] Met: [] Not Met: [] Adjusted  2. Patient will have a decrease in pain to facilitate improvement in movement, function, and ADLs as indicated by Functional Deficits. [] Progressing: [] Met: [] Not Met: [] Adjusted    Long Term Goals: To be achieved in: 10 weeks  1.  Disability index score of 10% or better for the FOTO ankle score and the DFOS to assist with reaching prior level of function. [] Progressing: [] Met: [] Not Met: [] Adjusted  2. Patient will demonstrate increased AROM to WNL and symmetrical to other side to allow for proper joint functioning as indicated by patients Functional Deficits. [] Progressing: [] Met: [] Not Met: [] Adjusted  3. Patient will demonstrate an increase in Strength to good proximal hip strength and control, within 5lb HHD in LE to allow for proper functional mobility as indicated by patients Functional Deficits. [] Progressing: [] Met: [] Not Met: [] Adjusted  4. Patient will be able to ascend and descend 2 flights of stairs in normal reciprocal pattern without increased symptoms or restriction. [] Progressing: [] Met: [] Not Met: [] Adjusted  5. Pt will be able to perform at least 15 single leg releve (heel raise) with good heel height and ankle stability symmetrical to other side and without symptoms or restriction. [] Progressing: [] Met: [] Not Met: [] Adjusted       Progression Towards Functional goals:  [] Patient is progressing as expected towards functional goals listed. [] Progression is slowed due to complexities listed. [] Progression has been slowed due to co-morbidities. [x] Plan just implemented, too soon to assess goals progression  [] Other:         Overall Progression Towards Functional goals/ Treatment Progress Update:  [] Patient is progressing as expected towards functional goals listed. [] Progression is slowed due to complexities/Impairments listed. [] Progression has been slowed due to co-morbidities.   [x] Plan just implemented, too soon to assess goals progression <30days   [] Goals require adjustment due to lack of progress  [] Patient is not progressing as expected and requires additional follow up with physician  [] Other    Prognosis for POC: [x] Good [] Fair  [] Poor      Patient requires continued skilled intervention: [x] Yes  [] No    Treatment/Activity Tolerance:  [x] Patient able to complete treatment  [] Patient limited by fatigue  [] Patient limited by pain    [] Patient limited by other medical complications  [] Other:     ASSESSMENT: Pt is maintaining full and pain free ROM and normalized talar posterior gliding. Began working out of her ASO while in PT today with various planes of movement for dynamic stability and general strengthening. Pt was sore 1/10 generally in the ankle but no pain in the OCD area specifically. Return to Play: (if applicable)   []  Stage 1: Intro to Strength   []  Stage 2: Return to Run and Strength   []  Stage 3: Return to Jump and Strength   []  Stage 4: Dynamic Strength and Agility   []  Stage 5: Sport Specific Training     []  Ready to Return to Play, Meets All Above Stages   []  Not Ready for Return to Sports   Comments:                               PLAN: See harry. [facility], and once a week with me as a dance medicine expert but is welcome to transfer care either way as they feel comfortable. She will be seen 2 times a week for 10 weeks however we may be limited by the amount of visits that are covered through her insurance plan. [x] Continue per plan of care [] Alter current plan (see comments above)  [] Plan of care initiated [] Hold pending MD visit [] Discharge      Electronically signed by:  Zarina Kat, PT , DPT, ATC, OCS [id number]  Note: If patient does not return for scheduled/ recommended follow up visits, this note will serve as a discharge from care along with most recent update on progress.

## 2022-08-17 ENCOUNTER — APPOINTMENT (OUTPATIENT)
Dept: PHYSICAL THERAPY | Facility: REHABILITATION | Age: 67
End: 2022-08-17
Attending: NURSE PRACTITIONER
Payer: COMMERCIAL

## 2022-08-24 ENCOUNTER — APPOINTMENT (OUTPATIENT)
Dept: PHYSICAL THERAPY | Facility: REHABILITATION | Age: 67
End: 2022-08-24
Attending: NURSE PRACTITIONER
Payer: COMMERCIAL

## 2022-08-31 ENCOUNTER — APPOINTMENT (OUTPATIENT)
Dept: PHYSICAL THERAPY | Facility: REHABILITATION | Age: 67
End: 2022-08-31
Attending: NURSE PRACTITIONER
Payer: COMMERCIAL

## 2022-11-03 ENCOUNTER — PATIENT MESSAGE (OUTPATIENT)
Dept: HEALTH INFORMATION MANAGEMENT | Facility: OTHER | Age: 67
End: 2022-11-03

## 2023-07-12 PROBLEM — S42.201A CLOSED FRACTURE OF RIGHT PROXIMAL HUMERUS: Status: ACTIVE | Noted: 2023-07-12

## 2023-10-11 PROBLEM — M25.611 SHOULDER STIFFNESS, RIGHT: Status: ACTIVE | Noted: 2023-10-11

## 2023-11-04 NOTE — PROGRESS NOTES
Dermatology New Patient Visit    Chief Complaint   Patient presents with   • Establish Care   • Cyst       Subjective:     HPI:   Elizabeth Blunt is a 65 y.o. female presenting for    HPI: possible cyst   Location: chest    Time present: 2 years   Painful lesion: Yes, at times when she presses on it.   Itching lesion: No  Enlarging lesion: enlarges and then goes down in size  Anything make it better or worse?no         History of skin cancer: No  History of precancers/actinic keratoses: No  History of biopsies:No  History of blistering/severe sunburns:No  Family history of skin cancer:No  Family history of atypical moles:No        Past Medical History:   Diagnosis Date   • Allergic rhinitis    • Dalton's esophagus 4/14/2017   • Dental disorder     partial upper   • Diabetes mellitus without complication (HCC) 11/24/2015   • Dyslipidemia    • Essential hypertension 8/25/2015   • GERD (gastroesophageal reflux disease) 5/7/2009   • HTN    • Low back pain    • Migraine headache        Current Outpatient Medications on File Prior to Visit   Medication Sig Dispense Refill   • losartan (COZAAR) 100 MG Tab Take 1 Tab by mouth every day. 90 Tab 1   • omeprazole (PRILOSEC) 20 MG delayed-release capsule TAKE 1 CAPSULE BY MOUTH EVERY DAY 30 Cap 1   • Empagliflozin-metFORMIN HCl (SYNJARDY) 5-1000 MG Tab Take 1 tablet by mouth 2 Times a Day. 60 Tab 3   • Insulin Degludec-Liraglutide (XULTOPHY) 100-3.6 UNIT-MG/ML Solution Pen-injector Inject 16 Units as instructed every day. Titrate by 2 units as needed and as directed by provider 15 mL 3   • aspirin 81 MG tablet Take 81 mg by mouth every day.     • simvastatin (ZOCOR) 40 MG Tab Take 1 Tab by mouth every evening. 90 Tab 3   • Insulin Pen Needle 1 Units by Does not apply route every day. 100 Each 3   • Lancets Lancets order: Lancets for insurance approved meter meter. Sig: use daily and prn ssx high or low sugar. #100 RF x 3 100 Each 1   • glucose blood strip USE DAILY AND AS  Patient : Mary Whiting Age: 52 year old Sex: female   MRN: [id number] Encounter Date: 11/4/2023    History     Chief Complaint   Patient presents with   • Chest Pain       HPI    Mary Whiting is a 52 year old female with PMH of asthma, obesity, anemia, and fibromyalgia who presents to the ED today for evaluation of chest pain. The patient reports the pain started at 1100 yesterday when she was lying in bed. She reports first she felt constipated upon waking and was having some spasms of her abdomen. This resolved with taking over the counter laxative and having a BM. However, left sided chest pain, left sided back pain, and left shoulder pain persisted. The pain is constant. Active range of motion of the left arm worsens the pain. Nothing improves the pain. She did try taking Aleve at noon today.  No nausea, vomiting, diaphoresis, lightheadedness, dizziness. No shortness of breath, cough, orthopnea, palpitations. She reports no recent cold symptoms and no recent worsening of asthma symptoms. Patient does report that she lifted a 40 pack bottle of water from the grocery store earlier this week and she reports this initially did cause her back to hurt. She describes the pain as a pulled muscle and thinks this may have caused it.     The patient does not take a daily aspirin and/or nitroglycerin.     Previous stress test - none.  It appears that patient underwent echocardiogram on 4/7/21, however, I am unable to see these results as they were performed through outside hospital.              I reviewed note from most recent visit with PCP on 10/11/23. POC A1c was 6. Patient also prescribed Percocet at this time for chronic hip pain. Upon review of PDMP it appears patient was prescribed 30 tablets of Percocet 5/325mg.         Allergies   Allergen Reactions   • Penicillins RASH     Pt tolerates ceftriaxone as of 10/2022       No current facility-administered medications for this encounter.     Current Outpatient  Medications   Medication Sig   • tiZANidine (ZANAFLEX) 4 MG tablet Take 1 tablet by mouth every 8 hours as needed (muscle spasms).   • oxyCODONE-acetaminophen (Percocet) 5-325 MG per tablet Take 1 tablet by mouth every 6 hours as needed for Pain (Not to exceed 4000 mg acetaminophen per day).   • doxycycline hyclate (VIBRAMYCIN) 100 MG capsule Take 1 capsule by mouth in the morning and 1 capsule in the evening.   • albuterol 108 (90 Base) MCG/ACT inhaler Inhale 2 puffs into the lungs every 4 hours as needed for Shortness of Breath or Wheezing.   • fluticasone-umeclidin-vilanterol (Trelegy Ellipta) 200-62.5-25 MCG/ACT inhaler Inhale 1 puff into the lungs daily. Lot number: [id number]    Expires: mar 2024   • montelukast (SINGULAIR) 10 MG tablet Take 1 tablet by mouth every evening.   • levalbuterol (XOPENEX HFA) 45 MCG/ACT inhaler Inhale 1-2 puffs into the lungs every 4 hours as needed.   • naproxen sodium (ALEVE) 220 MG tablet Take by mouth every 12 hours.   • Phentermine-Topiramate (Qsymia) 7.5-46 MG CAPSULE SR 24 HR Take 1 capsule by mouth daily.   • potassium chloride (KLOR-CON M) 10 MEQ mikey ER tablet Take 10 mEq by mouth daily as needed. Take daily when taking Furosemide   • albuterol-ipratropium 2.5 mg/0.5 mg (DUONEB) 0.5-2.5 (3) MG/3ML nebulizer solution 3 mLs every 6 hours as needed.   • FeAsp-FeFum -Suc-C-Thre-B12-FA (MULTIGEN PLUS) -1 MG Tab Take 1 tablet by mouth daily.   • SYMBICORT 160-4.5 MCG/ACT inhaler INL 2 PFS PO BID   • furosemide (LASIX) 20 MG tablet 20 mg every 24 hours as needed.       Past Medical History:   Diagnosis Date   • Asthma    • Bronchitis    • RAD (reactive airway disease)        Past Surgical History:   Procedure Laterality Date   • Tubal ligation         No family history on file.    Social History     Tobacco Use   • Smoking status: Never   • Smokeless tobacco: Never   Vaping Use   • Vaping Use: never used   Substance Use Topics   • Alcohol use: No   • Drug use: Yes     Types:  NEEDED FOR HIGH OR LOW SUGAR. 100 Strip 3   • amLODIPine (NORVASC) 5 MG Tab Take 1 Tab by mouth every day. 90 Tab 3   • naproxen (NAPROSYN) 500 MG Tab TAKE 1 TAB BY MOUTH 2 TIMES DAILY WITH FOOD  0   • Blood Glucose Monitoring Suppl Device Meter: Dispense Device of Insurance Preference. Sig. Use as directed for blood sugar monitoring. #1. NR. 1 Device 0   • Blood Glucose Monitoring Suppl Supplies Misc Test strips order: Test strips for insurance approved meter. Sig: use daily and prn ssx high or low sugar #100 RF x 3 100 Units 1     No current facility-administered medications on file prior to visit.        No Known Allergies    Family History   Problem Relation Age of Onset   • Hypertension Mother    • Cancer Mother 85        colon   • Hypertension Father    • Diabetes Father    • Cancer Cousin    • No Known Problems Son    • No Known Problems Son        Social History     Socioeconomic History   • Marital status:      Spouse name: Not on file   • Number of children: Not on file   • Years of education: Not on file   • Highest education level: Not on file   Occupational History   • Occupation: Retired     Employer: [employer]   Social Needs   • Financial resource strain: Not on file   • Food insecurity     Worry: Not on file     Inability: Not on file   • Transportation needs     Medical: Not on file     Non-medical: Not on file   Tobacco Use   • Smoking status: Former Smoker     Packs/day: 0.50     Years: 20.00     Pack years: 10.00     Types: Cigarettes     Last attempt to quit: 2006     Years since quittin.5   • Smokeless tobacco: Never Used   • Tobacco comment: 5 cig's a day for 20 yrs quit in    Substance and Sexual Activity   • Alcohol use: Not Currently     Alcohol/week: 0.0 oz     Comment: 2 per month,  beer   • Drug use: No   • Sexual activity: Not Currently     Partners: Male     Birth control/protection: Surgical   Lifestyle   • Physical activity     Days per week: Not on file  "    Minutes per session: Not on file   • Stress: Not on file   Relationships   • Social connections     Talks on phone: Not on file     Gets together: Not on file     Attends Latter day service: Not on file     Active member of club or organization: Not on file     Attends meetings of clubs or organizations: Not on file     Relationship status: Not on file   • Intimate partner violence     Fear of current or ex partner: Not on file     Emotionally abused: Not on file     Physically abused: Not on file     Forced sexual activity: Not on file   Other Topics Concern   • Not on file   Social History Narrative     at [employer]       Review of Systems   Constitutional: Negative for chills, diaphoresis, fever, malaise/fatigue and weight loss.   Skin: Negative for itching and rash.        Objective:     A focused cutaneous exam was completed including: chest and face above mask with the following pertinent findings listed below. Remaining above-listed examined areas within normal limits / negative for rashes or lesions.      Temp 36.9 °C (98.5 °F)   Ht 1.6 m (5' 3\")   Wt 70.3 kg (155 lb)     Physical Exam   Constitutional: She is oriented to person, place, and time and well-developed, well-nourished, and in no distress. No distress.   HENT:   Head: Normocephalic.   Pulmonary/Chest: Effort normal.   Neurological: She is alert and oriented to person, place, and time.   Skin: Skin is warm and dry. No rash noted. No erythema.        Psychiatric: Mood normal.       DATA: none applicable to review    Assessment and Plan:     1. Sebaceous cyst of chest  Educated patient about diagnosis, management options, and expectations of treatment.  Discussed removal process  Pt will call if she desires removal. Will submit PA if pt desires removal.   All questions answered.     Followup: Return if symptoms worsen or fail to improve.    NOVA Phillip.        " Marijuana       Review of Systems     Review of Systems   Constitutional: Negative for activity change, appetite change, chills, diaphoresis, fatigue and fever.   HENT: Negative for congestion, rhinorrhea, sinus pressure, sinus pain, sore throat, trouble swallowing and voice change.    Eyes: Negative for visual disturbance.   Respiratory: Negative for cough, chest tightness, shortness of breath and wheezing.    Cardiovascular: Positive for chest pain. Negative for palpitations and leg swelling.   Gastrointestinal: Positive for constipation. Negative for abdominal pain, blood in stool, diarrhea, nausea and vomiting.   Genitourinary: Negative for decreased urine volume, difficulty urinating, dysuria, frequency and urgency.   Musculoskeletal: Positive for arthralgias (left shoulder) and myalgias.   Skin: Negative for rash and wound.   Allergic/Immunologic: Negative for immunocompromised state.   Neurological: Negative for dizziness, weakness, light-headedness, numbness and headaches.       Physical Exam     ED Triage Vitals [11/04/23 1520]   ED Triage Vitals Group      Temp 98.3 °F (36.8 °C)      Heart Rate 68      Resp 16      /69      SpO2 100 %      EtCO2 mmHg       Height 5' 3\" (1.6 m)      Weight 250 lb (113.4 kg)      Weight Scale Used       BMI (Calculated) 44.29      IBW/kg (Calculated) 52.4       Physical Exam  Vitals and nursing note reviewed.   Constitutional:       General: She is awake. She is not in acute distress.     Appearance: Normal appearance. She is well-developed and well-groomed. She is morbidly obese. She is not ill-appearing, toxic-appearing or diaphoretic.      Comments: The patient is lying on exam bed. She appears uncomfortable secondary to pain. She is awake, alert, oriented, afebrile, in no acute distress, and cooperative with examination.    HENT:      Head: Normocephalic and atraumatic.   Eyes:      General: No scleral icterus.        Right eye: No discharge.         Left eye: No  discharge.      Conjunctiva/sclera: Conjunctivae normal.   Cardiovascular:      Rate and Rhythm: Normal rate and regular rhythm.      Pulses: Normal pulses.           Radial pulses are 2+ on the right side and 2+ on the left side.      Heart sounds: No murmur heard.  Pulmonary:      Effort: Pulmonary effort is normal. No tachypnea, accessory muscle usage, respiratory distress or retractions.      Breath sounds: Normal breath sounds. No wheezing.      Comments: The patient is breathing comfortably on room air without increased work of breathing. Lungs CTA bilaterally. Pulse ox 100% on room air.   Chest:      Chest wall: Tenderness present. No mass or swelling.          Comments: Tenderness to palpation along left chest and left pectoral muscle. Pain also worsens with active ROM of the left arm although patient is able to perform full active ROM. No skin changes appreciated.   Abdominal:      General: Abdomen is flat. There is no distension.      Palpations: Abdomen is soft.      Tenderness: There is no abdominal tenderness. There is no guarding or rebound.   Musculoskeletal:        Back:       Right lower leg: No edema.      Left lower leg: No edema.      Comments: Patient with tenderness to the posterior left shoulder no skin changes. No skin changes. No CVA tenderness.    Skin:     General: Skin is warm.      Capillary Refill: Capillary refill takes less than 2 seconds.      Findings: No erythema or rash.   Neurological:      General: No focal deficit present.      Mental Status: She is alert and oriented to person, place, and time.   Psychiatric:         Behavior: Behavior is cooperative.           Procedures     Procedures    Lab Results     Results for orders placed or performed during the hospital encounter of 11/04/23   Comprehensive Metabolic Panel   Result Value Ref Range    Fasting Status      Sodium 138 135 - 145 mmol/L    Potassium 4.1 3.4 - 5.1 mmol/L    Chloride 104 97 - 110 mmol/L    Carbon Dioxide 26  21 - 32 mmol/L    Anion Gap 12 7 - 19 mmol/L    Glucose 120 (H) 70 - 99 mg/dL    BUN 14 6 - 20 mg/dL    Creatinine 1.13 (H) 0.51 - 0.95 mg/dL    Glomerular Filtration Rate 59 (L) >=60    BUN/Cr 12 7 - 25    Calcium 8.4 8.4 - 10.2 mg/dL    Bilirubin, Total 0.2 0.2 - 1.0 mg/dL    GOT/AST 15 <=37 Units/L    GPT/ALT 8 <64 Units/L    Alkaline Phosphatase 66 45 - 117 Units/L    Albumin 3.0 (L) 3.6 - 5.1 g/dL    Protein, Total 8.2 6.4 - 8.2 g/dL    Globulin 5.2 (H) 2.0 - 4.0 g/dL    A/G Ratio 0.6 (L) 1.0 - 2.4   TROPONIN I, HIGH SENSITIVITY   Result Value Ref Range    Troponin I, High Sensitivity 5 <52 ng/L   Magnesium   Result Value Ref Range    Magnesium 1.9 1.7 - 2.4 mg/dL   CBC with Automated Differential (performable only)   Result Value Ref Range    WBC 12.2 (H) 4.2 - 11.0 K/mcL    RBC 5.06 4.00 - 5.20 mil/mcL    HGB 10.7 (L) 12.0 - 15.5 g/dL    HCT 36.1 36.0 - 46.5 %    MCV 71.3 (L) 78.0 - 100.0 fl    MCH 21.1 (L) 26.0 - 34.0 pg    MCHC 29.6 (L) 32.0 - 36.5 g/dL    RDW-CV 16.8 (H) 11.0 - 15.0 %    RDW-SD 42.5 39.0 - 50.0 fL     140 - 450 K/mcL    NRBC 0 <=0 /100 WBC    Neutrophil, Percent 81 %    Lymphocytes, Percent 12 %    Mono, Percent 6 %    Eosinophils, Percent 1 %    Basophils, Percent 0 %    Immature Granulocytes 0 %    Absolute Neutrophils 9.8 (H) 1.8 - 7.7 K/mcL    Absolute Lymphocytes 1.5 1.0 - 4.8 K/mcL    Absolute Monocytes 0.7 0.3 - 0.9 K/mcL    Absolute Eosinophils  0.1 0.0 - 0.5 K/mcL    Absolute Basophils 0.1 0.0 - 0.3 K/mcL    Absolute Immature Granulocytes 0.0 0.0 - 0.2 K/mcL   Procalcitonin   Result Value Ref Range    Procalcitonin <0.05 <=0.09 ng/mL       EKG     EKG Interpretation  Rate: 66  Rhythm: normal sinus rhythm with sinus arrhythmia  Abnormality: borderline  Moderate voltage criteria for LVH    EKG independently interpreted by the emergency department physician      Radiology Results     Imaging Results          XR CHEST PA AND LATERAL 2 VIEWS (Final result)  Result time 11/04/23  16:55:46    Final result                 Impression:    IMPRESSION: No evidence of active pulmonary disease      Electronically Signed by: Sahil Schulz MD  Signed on: 11/4/2023 4:55 PM  Created on Workstation ID: [id number]  Signed on Workstation ID: [id number]             Narrative:    XR CHEST PA AND LATERAL 2 VIEWS    INDICATION: chest pain    COMPARISON: PA lateral chest performed on 10/18/2022.    FINDINGS: Both lung fields appear clear. No evidence of a pneumothorax nor  pleural effusion                                ED Medications     ED Medication Orders (From admission, onward)    Ordered Start     Status Ordering Provider    11/04/23 1641 11/04/23 1642  acetaminophen (TYLENOL) tablet 1,000 mg  ONCE         Last MAR action: Given TESSA KAUFMAN    11/04/23 1641 11/04/23 1642  orphenadrine (NORFLEX) injection 60 mg  ONCE         Last MAR action: Given TESSA KAUFMAN          ED Course     Vitals:    11/04/23 1520   BP: 134/69   Pulse: 68   Resp: 16   Temp: 98.3 °F (36.8 °C)   SpO2: 100%   Weight: 113.4 kg (250 lb)   Height: 5' 3\" (1.6 m)       ED Course as of 11/04/23 2353   Sat Nov 04, 2023   1641 WBC(!): 12.2  Patient does NOT meet SIRS criteria and presentation less suspicious for infectious process. Procalcitonin added onto workup for further evaluation. CXR in process.  [KG]      ED Course User Index  [KG] Tessa Kaufman PA-C       Radiology Review: I have independently interpreted the Chest X-Ray and have found No acute or active disease.  I am awaiting on the final radiology read.          HEART Score for Major Cardiac Events:   History: Slightly suspicious   EKG Normal   AGE greater than 45 to less than 65   RISK FACTOR 1-2 risk factors   TROPONIN: Equal or less than normal limit   TOTAL SCORE 2     Additional Information    Low Risk HEART Score Results:   Discussion and Patient Decision:     The patient's HEART Score is considered to be at low risk for a Major Adverse  Cardiac Event (MACE).  The calculated risk is 1.7 % at 6 weeks.              Consults                    MDM                                Patient is a 52 year old with complaint of chest pain.  My differential diagnosis includes but is not limited to ACS, PE, CAP, pneumothorax, pleural effusion, GERD, costochondritis, musculoskeletal etiology. The patient will not require admission.     On examination, the patient is overall well appearing. She does appear uncomfortable secondary to chest discomfort but she is in no acute distress. Examination is more consistent with musculoskeletal etiology as pain started after lifting case of water earlier this week, she is tender along musculature of left chest and left back, and pain worsens with active ROM of the left arm.     Cardiac workup obtained.   EKG shows no acute ischemic changes.   Troponin I high sensitivity WNL at 5. Patient reports symptom onset >6 hours. No indication for repeat troponin.   CXR shows no acute cardiopulmonary abnormalities.   CBC does show mild leukocytosis of 12.2 and anemia of 10.7. Patient does NOT meet SIRS criteria and no clinical findings consistent with infectious process on examination.   CMP shows no electrolyte abnormalities requiring supplementation. Kidney function near baseline. No transaminitis or elevation of total bilirubin.   Procalcitonin undetected.     HEART score 2.  Unable to utilize PERC score but Well's PE score LOW.    I discussed with the patient her lab and imaging findings. We discussed my recommendation that she follow up with her PCP. Plan for her to also follow up with PT. Service to PT order placed. We discussed utilizing OTC Tylenol, ibuprofen, and lidocaine patches. Patient also with muscle spasms, so tizanidine prescribed. We did discuss risks vs benefits of muscle relaxer. Patient also concerned as she only has a few tablets left of Percocet and is requesting refill. We discussed risks vs benefits of opioid  pain medications which patient verbalizes understanding to. Small refill provided for new acute pain. Strict ED return precautions discussed. Patient verbalized understanding and agreement of plan. All questions answered.       Patient's presentation, workup, and plan discussed with ED attending physician, Dr. Cabrera.        Does the Patient have sepsis: NO     Critical Care       No Critical Care        Disposition       Clinical Impression and Diagnosis  5:35 PM       ED Diagnoses     Diagnosis Comment Associated Orders       Final diagnoses    Chest pain, unspecified type --  SERVICE TO PHYSICAL THERAPY   OXYCODONE-ACETAMINOPHEN 5-325 MG PO TABS      Acute pain of left shoulder --  SERVICE TO PHYSICAL THERAPY   OXYCODONE-ACETAMINOPHEN 5-325 MG PO TABS      Upper back pain on left side --  SERVICE TO PHYSICAL THERAPY   OXYCODONE-ACETAMINOPHEN 5-325 MG PO TABS            Follow Up:  Ollie Brizuela MD  [address] [facility] [address] [phone]    Schedule an appointment as soon as possible for a visit       [facility]  [address] [facility] [address]  [phone]  Go to   As needed, If symptoms worsen    [facility]  [phone]  [address] [place] [address]  Schedule an appointment as soon as possible for a visit             Summary of your Discharge Medications      Take these Medications      Details   oxyCODONE-acetaminophen 5-325 MG per tablet  Commonly known as: Percocet   Take 1 tablet by mouth every 6 hours as needed for Pain (Not to exceed 4000 mg acetaminophen per day).     tiZANidine 4 MG tablet  Commonly known as: ZANAFLEX   Take 1 tablet by mouth every 8 hours as needed (muscle spasms).            Pt is discharged to home/self care in stable condition.                Discharge 11/4/2023  5:35 PM  Mary Whiting discharge to home/self care.        Tessa Wilson PA-C  Emergency Medicine PA  Attending Physician:   Tessa Cortes PA-C  11/04/23 [id number]